# Patient Record
Sex: FEMALE | Race: BLACK OR AFRICAN AMERICAN | NOT HISPANIC OR LATINO | Employment: UNEMPLOYED | RURAL
[De-identification: names, ages, dates, MRNs, and addresses within clinical notes are randomized per-mention and may not be internally consistent; named-entity substitution may affect disease eponyms.]

---

## 2018-06-11 ENCOUNTER — HISTORICAL (OUTPATIENT)
Dept: ADMINISTRATIVE | Facility: HOSPITAL | Age: 53
End: 2018-06-11

## 2018-06-12 LAB
LAB AP CLINICAL INFORMATION: NORMAL
LAB AP DIAGNOSIS - HISTORICAL: NORMAL
LAB AP GROSS PATHOLOGY - HISTORICAL: NORMAL
LAB AP SPECIMEN SUBMITTED - HISTORICAL: NORMAL

## 2021-03-01 ENCOUNTER — HISTORICAL (OUTPATIENT)
Dept: ADMINISTRATIVE | Facility: HOSPITAL | Age: 56
End: 2021-03-01

## 2021-06-03 DIAGNOSIS — Z87.19 HISTORY OF GASTRIC POLYP: Primary | ICD-10-CM

## 2021-06-21 ENCOUNTER — OFFICE VISIT (OUTPATIENT)
Dept: GASTROENTEROLOGY | Facility: CLINIC | Age: 56
End: 2021-06-21
Payer: COMMERCIAL

## 2021-06-21 VITALS
DIASTOLIC BLOOD PRESSURE: 78 MMHG | SYSTOLIC BLOOD PRESSURE: 136 MMHG | WEIGHT: 192 LBS | OXYGEN SATURATION: 96 % | HEIGHT: 66 IN | BODY MASS INDEX: 30.86 KG/M2 | HEART RATE: 73 BPM

## 2021-06-21 DIAGNOSIS — R10.13 EPIGASTRIC PAIN: Primary | ICD-10-CM

## 2021-06-21 DIAGNOSIS — R10.11 RIGHT UPPER QUADRANT PAIN: ICD-10-CM

## 2021-06-21 DIAGNOSIS — Z86.010 PERSONAL HISTORY OF COLONIC POLYPS: ICD-10-CM

## 2021-06-21 DIAGNOSIS — R11.2 NAUSEA AND VOMITING, INTRACTABILITY OF VOMITING NOT SPECIFIED, UNSPECIFIED VOMITING TYPE: ICD-10-CM

## 2021-06-21 PROBLEM — Z86.0100 PERSONAL HISTORY OF COLONIC POLYPS: Status: ACTIVE | Noted: 2021-06-21

## 2021-06-21 PROCEDURE — 3008F PR BODY MASS INDEX (BMI) DOCUMENTED: ICD-10-PCS | Mod: CPTII,,, | Performed by: NURSE PRACTITIONER

## 2021-06-21 PROCEDURE — 3008F BODY MASS INDEX DOCD: CPT | Mod: CPTII,,, | Performed by: NURSE PRACTITIONER

## 2021-06-21 PROCEDURE — 99214 PR OFFICE/OUTPT VISIT, EST, LEVL IV, 30-39 MIN: ICD-10-PCS | Mod: ,,, | Performed by: NURSE PRACTITIONER

## 2021-06-21 PROCEDURE — 99214 OFFICE O/P EST MOD 30 MIN: CPT | Mod: ,,, | Performed by: NURSE PRACTITIONER

## 2021-06-21 RX ORDER — METOCLOPRAMIDE 10 MG/1
1 TABLET ORAL
COMMUNITY
Start: 2021-05-13

## 2021-06-21 RX ORDER — OMEPRAZOLE 40 MG/1
1 CAPSULE, DELAYED RELEASE ORAL 2 TIMES DAILY
COMMUNITY
Start: 2021-04-02

## 2021-06-21 RX ORDER — METFORMIN HYDROCHLORIDE 1000 MG/1
1 TABLET ORAL 2 TIMES DAILY
COMMUNITY
Start: 2021-04-02

## 2021-06-21 RX ORDER — ESTRADIOL 2 MG/1
1 TABLET ORAL DAILY
COMMUNITY
Start: 2021-06-18

## 2021-06-21 RX ORDER — QUETIAPINE FUMARATE 50 MG/1
1 TABLET, FILM COATED ORAL NIGHTLY
COMMUNITY
Start: 2021-04-10

## 2021-06-21 RX ORDER — TRAVOPROST OPHTHALMIC SOLUTION 0.04 MG/ML
1 SOLUTION OPHTHALMIC DAILY
COMMUNITY
Start: 2021-05-06

## 2021-06-21 RX ORDER — HYDRALAZINE HYDROCHLORIDE 100 MG/1
1 TABLET, FILM COATED ORAL 3 TIMES DAILY
COMMUNITY
Start: 2021-06-18

## 2021-06-21 RX ORDER — METRONIDAZOLE 500 MG/1
1 TABLET ORAL 2 TIMES DAILY
COMMUNITY
Start: 2021-05-13

## 2021-06-21 RX ORDER — BUSPIRONE HYDROCHLORIDE 15 MG/1
1 TABLET ORAL DAILY
COMMUNITY
Start: 2021-06-18

## 2021-06-21 RX ORDER — OXYCODONE AND ACETAMINOPHEN 10; 325 MG/1; MG/1
1 TABLET ORAL EVERY 12 HOURS PRN
COMMUNITY
Start: 2021-06-16 | End: 2022-01-05 | Stop reason: SDUPTHER

## 2021-06-21 RX ORDER — CARVEDILOL 12.5 MG/1
1 TABLET ORAL DAILY
COMMUNITY
Start: 2021-06-18

## 2021-06-21 RX ORDER — GABAPENTIN 400 MG/1
1 CAPSULE ORAL 2 TIMES DAILY
COMMUNITY
Start: 2021-01-11 | End: 2022-01-05 | Stop reason: SDUPTHER

## 2021-06-21 RX ORDER — NETARSUDIL 0.2 MG/ML
1 SOLUTION/ DROPS OPHTHALMIC; TOPICAL DAILY
COMMUNITY
Start: 2021-06-03

## 2021-06-21 RX ORDER — CITALOPRAM 20 MG/1
1 TABLET, FILM COATED ORAL DAILY
COMMUNITY
Start: 2021-06-18

## 2021-06-21 RX ORDER — LISINOPRIL AND HYDROCHLOROTHIAZIDE 20; 25 MG/1; MG/1
1 TABLET ORAL DAILY
COMMUNITY
Start: 2021-06-18

## 2021-06-21 RX ORDER — POTASSIUM CHLORIDE 20 MEQ/1
1 TABLET, EXTENDED RELEASE ORAL 2 TIMES DAILY
COMMUNITY
Start: 2021-06-18

## 2021-06-21 RX ORDER — DORZOLAMIDE HYDROCHLORIDE AND TIMOLOL MALEATE 20; 5 MG/ML; MG/ML
1 SOLUTION/ DROPS OPHTHALMIC 2 TIMES DAILY
COMMUNITY
Start: 2021-04-01

## 2021-06-30 ENCOUNTER — ANESTHESIA EVENT (OUTPATIENT)
Dept: GASTROENTEROLOGY | Facility: HOSPITAL | Age: 56
End: 2021-06-30
Payer: COMMERCIAL

## 2021-06-30 ENCOUNTER — ANESTHESIA (OUTPATIENT)
Dept: GASTROENTEROLOGY | Facility: HOSPITAL | Age: 56
End: 2021-06-30
Payer: COMMERCIAL

## 2021-06-30 ENCOUNTER — HOSPITAL ENCOUNTER (OUTPATIENT)
Dept: GASTROENTEROLOGY | Facility: HOSPITAL | Age: 56
Discharge: HOME | End: 2021-06-30
Attending: NURSE PRACTITIONER
Payer: COMMERCIAL

## 2021-06-30 VITALS
HEART RATE: 59 BPM | OXYGEN SATURATION: 100 % | DIASTOLIC BLOOD PRESSURE: 81 MMHG | SYSTOLIC BLOOD PRESSURE: 139 MMHG | TEMPERATURE: 98 F | RESPIRATION RATE: 14 BRPM

## 2021-06-30 DIAGNOSIS — K29.00 ACUTE SUPERFICIAL GASTRITIS WITHOUT HEMORRHAGE: ICD-10-CM

## 2021-06-30 DIAGNOSIS — R13.19 ESOPHAGEAL DYSPHAGIA: ICD-10-CM

## 2021-06-30 DIAGNOSIS — R10.13 EPIGASTRIC PAIN: ICD-10-CM

## 2021-06-30 LAB
GLUCOSE SERPL-MCNC: 106 MG/DL (ref 70–105)
GLUCOSE SERPL-MCNC: 106 MG/DL (ref 70–110)

## 2021-06-30 PROCEDURE — 27000284 HC CANNULA NASAL: Performed by: NURSE ANESTHETIST, CERTIFIED REGISTERED

## 2021-06-30 PROCEDURE — 88305 TISSUE EXAM BY PATHOLOGIST: CPT | Mod: SUR | Performed by: INTERNAL MEDICINE

## 2021-06-30 PROCEDURE — D9220A PRA ANESTHESIA: Mod: ,,, | Performed by: NURSE ANESTHETIST, CERTIFIED REGISTERED

## 2021-06-30 PROCEDURE — 27201423 OPTIME MED/SURG SUP & DEVICES STERILE SUPPLY

## 2021-06-30 PROCEDURE — C1889 IMPLANT/INSERT DEVICE, NOC: HCPCS

## 2021-06-30 PROCEDURE — 63600175 PHARM REV CODE 636 W HCPCS: Performed by: NURSE ANESTHETIST, CERTIFIED REGISTERED

## 2021-06-30 PROCEDURE — 88342 SURGICAL PATHOLOGY: ICD-10-PCS | Mod: 26,,, | Performed by: PATHOLOGY

## 2021-06-30 PROCEDURE — 88305 SURGICAL PATHOLOGY: ICD-10-PCS | Mod: 26,,, | Performed by: PATHOLOGY

## 2021-06-30 PROCEDURE — 37000009 HC ANESTHESIA EA ADD 15 MINS

## 2021-06-30 PROCEDURE — 88305 TISSUE EXAM BY PATHOLOGIST: CPT | Mod: 26,,, | Performed by: PATHOLOGY

## 2021-06-30 PROCEDURE — 25000003 PHARM REV CODE 250: Performed by: NURSE ANESTHETIST, CERTIFIED REGISTERED

## 2021-06-30 PROCEDURE — 43450 DILATE ESOPHAGUS 1/MULT PASS: CPT

## 2021-06-30 PROCEDURE — 82962 GLUCOSE BLOOD TEST: CPT

## 2021-06-30 PROCEDURE — D9220A PRA ANESTHESIA: ICD-10-PCS | Mod: ,,, | Performed by: NURSE ANESTHETIST, CERTIFIED REGISTERED

## 2021-06-30 PROCEDURE — 37000008 HC ANESTHESIA 1ST 15 MINUTES

## 2021-06-30 PROCEDURE — 88342 IMHCHEM/IMCYTCHM 1ST ANTB: CPT | Mod: 26,,, | Performed by: PATHOLOGY

## 2021-06-30 PROCEDURE — 43239 EGD BIOPSY SINGLE/MULTIPLE: CPT

## 2021-06-30 RX ORDER — PROPOFOL 10 MG/ML
VIAL (ML) INTRAVENOUS
Status: DISCONTINUED | OUTPATIENT
Start: 2021-06-30 | End: 2021-06-30

## 2021-06-30 RX ORDER — GLYCOPYRROLATE 0.2 MG/ML
INJECTION INTRAMUSCULAR; INTRAVENOUS
Status: DISCONTINUED | OUTPATIENT
Start: 2021-06-30 | End: 2021-06-30

## 2021-06-30 RX ORDER — SODIUM CHLORIDE 0.9 % (FLUSH) 0.9 %
10 SYRINGE (ML) INJECTION
Status: DISCONTINUED | OUTPATIENT
Start: 2021-06-30 | End: 2021-07-01 | Stop reason: HOSPADM

## 2021-06-30 RX ADMIN — PROPOFOL 50 MG: 10 INJECTION, EMULSION INTRAVENOUS at 11:06

## 2021-06-30 RX ADMIN — GLYCOPYRROLATE 0.2 MG: 0.2 INJECTION INTRAMUSCULAR; INTRAVENOUS at 11:06

## 2021-06-30 RX ADMIN — SODIUM CHLORIDE: 9 INJECTION, SOLUTION INTRAVENOUS at 11:06

## 2021-07-01 LAB
DHEA SERPL-MCNC: NORMAL
ESTROGEN SERPL-MCNC: NORMAL PG/ML
LAB AP GROSS DESCRIPTION: NORMAL
LAB AP LABORATORY NOTES: NORMAL
T3RU NFR SERPL: NORMAL %

## 2021-07-07 ENCOUNTER — HOSPITAL ENCOUNTER (OUTPATIENT)
Dept: RADIOLOGY | Facility: HOSPITAL | Age: 56
Discharge: HOME | End: 2021-07-07
Attending: NURSE PRACTITIONER
Payer: COMMERCIAL

## 2021-07-07 DIAGNOSIS — R10.11 RIGHT UPPER QUADRANT PAIN: ICD-10-CM

## 2021-07-07 DIAGNOSIS — R11.2 NAUSEA AND VOMITING, INTRACTABILITY OF VOMITING NOT SPECIFIED, UNSPECIFIED VOMITING TYPE: ICD-10-CM

## 2021-07-07 PROCEDURE — 78227 HEPATOBIL SYST IMAGE W/DRUG: CPT | Mod: TC

## 2021-07-07 PROCEDURE — 78227 HEPATOBIL SYST IMAGE W/DRUG: CPT | Mod: 26,,, | Performed by: RADIOLOGY

## 2021-07-07 PROCEDURE — 78227 NM HEPATOBILIARY(HIDA) WITH PHARM AND EF: ICD-10-PCS | Mod: 26,,, | Performed by: RADIOLOGY

## 2021-07-08 ENCOUNTER — TELEPHONE (OUTPATIENT)
Dept: GASTROENTEROLOGY | Facility: CLINIC | Age: 56
End: 2021-07-08

## 2021-07-08 DIAGNOSIS — K82.8 DYSFUNCTIONAL GALLBLADDER: Primary | ICD-10-CM

## 2021-07-22 ENCOUNTER — ANESTHESIA (OUTPATIENT)
Dept: SURGERY | Facility: HOSPITAL | Age: 56
End: 2021-07-22
Payer: COMMERCIAL

## 2021-07-22 ENCOUNTER — HOSPITAL ENCOUNTER (OUTPATIENT)
Facility: HOSPITAL | Age: 56
Discharge: HOME | End: 2021-07-23
Attending: FAMILY MEDICINE | Admitting: SURGERY
Payer: COMMERCIAL

## 2021-07-22 ENCOUNTER — ANESTHESIA EVENT (OUTPATIENT)
Dept: SURGERY | Facility: HOSPITAL | Age: 56
End: 2021-07-22
Payer: MEDICARE

## 2021-07-22 DIAGNOSIS — R10.11 RIGHT UPPER QUADRANT PAIN: ICD-10-CM

## 2021-07-22 DIAGNOSIS — R10.13 EPIGASTRIC PAIN: ICD-10-CM

## 2021-07-22 DIAGNOSIS — K29.00 ACUTE SUPERFICIAL GASTRITIS WITHOUT HEMORRHAGE: ICD-10-CM

## 2021-07-22 DIAGNOSIS — Z86.010 PERSONAL HISTORY OF COLONIC POLYPS: ICD-10-CM

## 2021-07-22 DIAGNOSIS — K80.00 CALCULUS OF GALLBLADDER WITH ACUTE CHOLECYSTITIS WITHOUT OBSTRUCTION: Primary | ICD-10-CM

## 2021-07-22 DIAGNOSIS — R13.19 ESOPHAGEAL DYSPHAGIA: ICD-10-CM

## 2021-07-22 DIAGNOSIS — R11.2 NAUSEA AND VOMITING: ICD-10-CM

## 2021-07-22 DIAGNOSIS — E87.6 HYPOKALEMIA: ICD-10-CM

## 2021-07-22 LAB
ALBUMIN SERPL BCP-MCNC: 3.2 G/DL (ref 3.5–5)
ALBUMIN/GLOB SERPL: 0.8 {RATIO}
ALP SERPL-CCNC: 80 U/L (ref 46–118)
ALT SERPL W P-5'-P-CCNC: 19 U/L (ref 13–56)
ANION GAP SERPL CALCULATED.3IONS-SCNC: 10 MMOL/L (ref 7–16)
AST SERPL W P-5'-P-CCNC: 12 U/L (ref 15–37)
BASOPHILS # BLD AUTO: 0.01 K/UL (ref 0–0.2)
BASOPHILS NFR BLD AUTO: 0.2 % (ref 0–1)
BILIRUB SERPL-MCNC: 0.2 MG/DL (ref 0–1.2)
BUN SERPL-MCNC: 6 MG/DL (ref 7–18)
BUN/CREAT SERPL: 8 (ref 6–20)
CALCIUM SERPL-MCNC: 9 MG/DL (ref 8.5–10.1)
CHLORIDE SERPL-SCNC: 103 MMOL/L (ref 98–107)
CO2 SERPL-SCNC: 34 MMOL/L (ref 21–32)
CREAT SERPL-MCNC: 0.71 MG/DL (ref 0.55–1.02)
DIFFERENTIAL METHOD BLD: ABNORMAL
EOSINOPHIL # BLD AUTO: 0.04 K/UL (ref 0–0.5)
EOSINOPHIL NFR BLD AUTO: 0.6 % (ref 1–4)
ERYTHROCYTE [DISTWIDTH] IN BLOOD BY AUTOMATED COUNT: 12.8 % (ref 11.5–14.5)
GLOBULIN SER-MCNC: 4.2 G/DL (ref 2–4)
GLUCOSE SERPL-MCNC: 101 MG/DL (ref 70–105)
GLUCOSE SERPL-MCNC: 107 MG/DL (ref 74–106)
GLUCOSE SERPL-MCNC: 108 MG/DL (ref 70–105)
HCT VFR BLD AUTO: 33.2 % (ref 38–47)
HGB BLD-MCNC: 11.4 G/DL (ref 12–16)
IMM GRANULOCYTES # BLD AUTO: 0.01 K/UL (ref 0–0.04)
IMM GRANULOCYTES NFR BLD: 0.2 % (ref 0–0.4)
LIPASE SERPL-CCNC: 72 U/L (ref 73–393)
LYMPHOCYTES # BLD AUTO: 2.62 K/UL (ref 1–4.8)
LYMPHOCYTES NFR BLD AUTO: 39.8 % (ref 27–41)
MCH RBC QN AUTO: 29.3 PG (ref 27–31)
MCHC RBC AUTO-ENTMCNC: 34.3 G/DL (ref 32–36)
MCV RBC AUTO: 85.3 FL (ref 80–96)
MONOCYTES # BLD AUTO: 0.39 K/UL (ref 0–0.8)
MONOCYTES NFR BLD AUTO: 5.9 % (ref 2–6)
MPC BLD CALC-MCNC: 9.1 FL (ref 9.4–12.4)
NEUTROPHILS # BLD AUTO: 3.52 K/UL (ref 1.8–7.7)
NEUTROPHILS NFR BLD AUTO: 53.3 % (ref 53–65)
NRBC # BLD AUTO: 0 X10E3/UL
NRBC, AUTO (.00): 0 %
PLATELET # BLD AUTO: 318 K/UL (ref 150–400)
POTASSIUM SERPL-SCNC: 2.8 MMOL/L (ref 3.5–5.1)
POTASSIUM SERPL-SCNC: 2.8 MMOL/L (ref 3.5–5.1)
PROT SERPL-MCNC: 7.4 G/DL (ref 6.4–8.2)
RBC # BLD AUTO: 3.89 M/UL (ref 4.2–5.4)
SODIUM SERPL-SCNC: 144 MMOL/L (ref 136–145)
WBC # BLD AUTO: 6.59 K/UL (ref 4.5–11)

## 2021-07-22 PROCEDURE — 27000689 HC BLADE LARYNGOSCOPE ANY SIZE: Performed by: ANESTHESIOLOGY

## 2021-07-22 PROCEDURE — 96375 TX/PRO/DX INJ NEW DRUG ADDON: CPT | Mod: 59

## 2021-07-22 PROCEDURE — 88304 SURGICAL PATHOLOGY: ICD-10-PCS | Mod: 26,,, | Performed by: PATHOLOGY

## 2021-07-22 PROCEDURE — D9220A PRA ANESTHESIA: Mod: CRNA,,, | Performed by: NURSE ANESTHETIST, CERTIFIED REGISTERED

## 2021-07-22 PROCEDURE — 85025 COMPLETE CBC W/AUTO DIFF WBC: CPT | Performed by: FAMILY MEDICINE

## 2021-07-22 PROCEDURE — 71000039 HC RECOVERY, EACH ADD'L HOUR: Performed by: SURGERY

## 2021-07-22 PROCEDURE — 27000260 *HC AIRWAY ORAL: Performed by: ANESTHESIOLOGY

## 2021-07-22 PROCEDURE — 27000165 HC TUBE, ETT CUFFED: Performed by: ANESTHESIOLOGY

## 2021-07-22 PROCEDURE — 99285 PR EMERGENCY DEPT VISIT,LEVEL V: ICD-10-PCS | Mod: ,,, | Performed by: FAMILY MEDICINE

## 2021-07-22 PROCEDURE — D9220A PRA ANESTHESIA: ICD-10-PCS | Mod: CRNA,,, | Performed by: NURSE ANESTHETIST, CERTIFIED REGISTERED

## 2021-07-22 PROCEDURE — G0378 HOSPITAL OBSERVATION PER HR: HCPCS

## 2021-07-22 PROCEDURE — 96374 THER/PROPH/DIAG INJ IV PUSH: CPT | Mod: 59 | Performed by: FAMILY MEDICINE

## 2021-07-22 PROCEDURE — D9220A PRA ANESTHESIA: Mod: ANES,,, | Performed by: ANESTHESIOLOGY

## 2021-07-22 PROCEDURE — 83690 ASSAY OF LIPASE: CPT | Performed by: FAMILY MEDICINE

## 2021-07-22 PROCEDURE — 36415 COLL VENOUS BLD VENIPUNCTURE: CPT | Performed by: FAMILY MEDICINE

## 2021-07-22 PROCEDURE — 63600175 PHARM REV CODE 636 W HCPCS: Performed by: NURSE PRACTITIONER

## 2021-07-22 PROCEDURE — 63600175 PHARM REV CODE 636 W HCPCS: Performed by: ANESTHESIOLOGY

## 2021-07-22 PROCEDURE — 99285 EMERGENCY DEPT VISIT HI MDM: CPT | Performed by: FAMILY MEDICINE

## 2021-07-22 PROCEDURE — 80053 COMPREHEN METABOLIC PANEL: CPT | Performed by: FAMILY MEDICINE

## 2021-07-22 PROCEDURE — 36415 COLL VENOUS BLD VENIPUNCTURE: CPT | Performed by: NURSE PRACTITIONER

## 2021-07-22 PROCEDURE — 47562 LAPAROSCOPIC CHOLECYSTECTOMY: CPT | Mod: ,,, | Performed by: SURGERY

## 2021-07-22 PROCEDURE — 82962 GLUCOSE BLOOD TEST: CPT | Mod: 91

## 2021-07-22 PROCEDURE — 94761 N-INVAS EAR/PLS OXIMETRY MLT: CPT

## 2021-07-22 PROCEDURE — 88304 TISSUE EXAM BY PATHOLOGIST: CPT | Mod: SUR | Performed by: SURGERY

## 2021-07-22 PROCEDURE — 27000221 HC OXYGEN, UP TO 24 HOURS

## 2021-07-22 PROCEDURE — 27000510 HC BLANKET BAIR HUGGER ANY SIZE: Performed by: ANESTHESIOLOGY

## 2021-07-22 PROCEDURE — 96375 TX/PRO/DX INJ NEW DRUG ADDON: CPT | Mod: 59 | Performed by: FAMILY MEDICINE

## 2021-07-22 PROCEDURE — 63600175 PHARM REV CODE 636 W HCPCS: Performed by: FAMILY MEDICINE

## 2021-07-22 PROCEDURE — 25000003 PHARM REV CODE 250: Performed by: SURGERY

## 2021-07-22 PROCEDURE — 88304 TISSUE EXAM BY PATHOLOGIST: CPT | Mod: 26,,, | Performed by: PATHOLOGY

## 2021-07-22 PROCEDURE — 63600175 PHARM REV CODE 636 W HCPCS: Performed by: NURSE ANESTHETIST, CERTIFIED REGISTERED

## 2021-07-22 PROCEDURE — 36000709 HC OR TIME LEV III EA ADD 15 MIN: Performed by: SURGERY

## 2021-07-22 PROCEDURE — 84132 ASSAY OF SERUM POTASSIUM: CPT | Performed by: NURSE PRACTITIONER

## 2021-07-22 PROCEDURE — 36000708 HC OR TIME LEV III 1ST 15 MIN: Performed by: SURGERY

## 2021-07-22 PROCEDURE — 27000716 HC OXISENSOR PROBE, ANY SIZE: Performed by: ANESTHESIOLOGY

## 2021-07-22 PROCEDURE — 47562 PR LAP,CHOLECYSTECTOMY: ICD-10-PCS | Mod: ,,, | Performed by: SURGERY

## 2021-07-22 PROCEDURE — 25000003 PHARM REV CODE 250: Performed by: FAMILY MEDICINE

## 2021-07-22 PROCEDURE — 99285 EMERGENCY DEPT VISIT HI MDM: CPT | Mod: ,,, | Performed by: FAMILY MEDICINE

## 2021-07-22 PROCEDURE — 37000008 HC ANESTHESIA 1ST 15 MINUTES: Performed by: SURGERY

## 2021-07-22 PROCEDURE — 71000033 HC RECOVERY, INTIAL HOUR: Performed by: SURGERY

## 2021-07-22 PROCEDURE — D9220A PRA ANESTHESIA: ICD-10-PCS | Mod: ANES,,, | Performed by: ANESTHESIOLOGY

## 2021-07-22 PROCEDURE — 99219 PR INITIAL OBSERVATION CARE,LEVL II: CPT | Mod: 57,,, | Performed by: SURGERY

## 2021-07-22 PROCEDURE — 99219 PR INITIAL OBSERVATION CARE,LEVL II: ICD-10-PCS | Mod: 57,,, | Performed by: SURGERY

## 2021-07-22 PROCEDURE — 37000009 HC ANESTHESIA EA ADD 15 MINS: Performed by: SURGERY

## 2021-07-22 PROCEDURE — 27201423 OPTIME MED/SURG SUP & DEVICES STERILE SUPPLY: Performed by: SURGERY

## 2021-07-22 PROCEDURE — 25000003 PHARM REV CODE 250: Performed by: NURSE ANESTHETIST, CERTIFIED REGISTERED

## 2021-07-22 PROCEDURE — 25000003 PHARM REV CODE 250: Performed by: NURSE PRACTITIONER

## 2021-07-22 RX ORDER — ONDANSETRON 2 MG/ML
4 INJECTION INTRAMUSCULAR; INTRAVENOUS ONCE AS NEEDED
Status: DISCONTINUED | OUTPATIENT
Start: 2021-07-22 | End: 2021-07-23 | Stop reason: HOSPADM

## 2021-07-22 RX ORDER — OXYCODONE HYDROCHLORIDE 5 MG/1
5 TABLET ORAL
Status: DISCONTINUED | OUTPATIENT
Start: 2021-07-22 | End: 2021-07-22 | Stop reason: HOSPADM

## 2021-07-22 RX ORDER — MIDAZOLAM HYDROCHLORIDE 1 MG/ML
INJECTION INTRAMUSCULAR; INTRAVENOUS
Status: DISCONTINUED | OUTPATIENT
Start: 2021-07-22 | End: 2021-07-22

## 2021-07-22 RX ORDER — ROCURONIUM BROMIDE 10 MG/ML
INJECTION, SOLUTION INTRAVENOUS
Status: DISCONTINUED | OUTPATIENT
Start: 2021-07-22 | End: 2021-07-22

## 2021-07-22 RX ORDER — LIDOCAINE HYDROCHLORIDE 20 MG/ML
INJECTION, SOLUTION EPIDURAL; INFILTRATION; INTRACAUDAL; PERINEURAL
Status: DISCONTINUED | OUTPATIENT
Start: 2021-07-22 | End: 2021-07-22

## 2021-07-22 RX ORDER — MORPHINE SULFATE 10 MG/ML
4 INJECTION INTRAMUSCULAR; INTRAVENOUS; SUBCUTANEOUS EVERY 5 MIN PRN
Status: DISCONTINUED | OUTPATIENT
Start: 2021-07-22 | End: 2021-07-22 | Stop reason: HOSPADM

## 2021-07-22 RX ORDER — POTASSIUM CHLORIDE 20 MEQ/1
40 TABLET, EXTENDED RELEASE ORAL ONCE
Status: DISCONTINUED | OUTPATIENT
Start: 2021-07-22 | End: 2021-07-22

## 2021-07-22 RX ORDER — ACETAMINOPHEN 325 MG/1
650 TABLET ORAL EVERY 8 HOURS PRN
Status: DISCONTINUED | OUTPATIENT
Start: 2021-07-22 | End: 2021-07-23 | Stop reason: HOSPADM

## 2021-07-22 RX ORDER — ENOXAPARIN SODIUM 100 MG/ML
40 INJECTION SUBCUTANEOUS EVERY 24 HOURS
Status: DISCONTINUED | OUTPATIENT
Start: 2021-07-23 | End: 2021-07-23 | Stop reason: HOSPADM

## 2021-07-22 RX ORDER — POTASSIUM CHLORIDE 20 MEQ/1
40 TABLET, EXTENDED RELEASE ORAL ONCE
Status: COMPLETED | OUTPATIENT
Start: 2021-07-22 | End: 2021-07-22

## 2021-07-22 RX ORDER — MEPERIDINE HYDROCHLORIDE 25 MG/ML
25 INJECTION INTRAMUSCULAR; INTRAVENOUS; SUBCUTANEOUS EVERY 10 MIN PRN
Status: DISCONTINUED | OUTPATIENT
Start: 2021-07-22 | End: 2021-07-22 | Stop reason: HOSPADM

## 2021-07-22 RX ORDER — HYDROMORPHONE HYDROCHLORIDE 2 MG/ML
0.5 INJECTION, SOLUTION INTRAMUSCULAR; INTRAVENOUS; SUBCUTANEOUS EVERY 5 MIN PRN
Status: DISCONTINUED | OUTPATIENT
Start: 2021-07-22 | End: 2021-07-22 | Stop reason: HOSPADM

## 2021-07-22 RX ORDER — ONDANSETRON 2 MG/ML
4 INJECTION INTRAMUSCULAR; INTRAVENOUS DAILY PRN
Status: DISCONTINUED | OUTPATIENT
Start: 2021-07-22 | End: 2021-07-22 | Stop reason: HOSPADM

## 2021-07-22 RX ORDER — KETOROLAC TROMETHAMINE 30 MG/ML
15 INJECTION, SOLUTION INTRAMUSCULAR; INTRAVENOUS EVERY 6 HOURS PRN
Status: DISCONTINUED | OUTPATIENT
Start: 2021-07-22 | End: 2021-07-23 | Stop reason: HOSPADM

## 2021-07-22 RX ORDER — DIPHENHYDRAMINE HYDROCHLORIDE 50 MG/ML
25 INJECTION INTRAMUSCULAR; INTRAVENOUS EVERY 6 HOURS PRN
Status: DISCONTINUED | OUTPATIENT
Start: 2021-07-22 | End: 2021-07-22 | Stop reason: HOSPADM

## 2021-07-22 RX ORDER — BUPIVACAINE HYDROCHLORIDE 2.5 MG/ML
INJECTION, SOLUTION EPIDURAL; INFILTRATION; INTRACAUDAL
Status: DISCONTINUED | OUTPATIENT
Start: 2021-07-22 | End: 2021-07-22 | Stop reason: HOSPADM

## 2021-07-22 RX ORDER — PROPOFOL 10 MG/ML
VIAL (ML) INTRAVENOUS
Status: DISCONTINUED | OUTPATIENT
Start: 2021-07-22 | End: 2021-07-22

## 2021-07-22 RX ORDER — HYDROMORPHONE HYDROCHLORIDE 2 MG/ML
1 INJECTION, SOLUTION INTRAMUSCULAR; INTRAVENOUS; SUBCUTANEOUS
Status: COMPLETED | OUTPATIENT
Start: 2021-07-22 | End: 2021-07-22

## 2021-07-22 RX ORDER — CEFAZOLIN SODIUM 1 G/3ML
INJECTION, POWDER, FOR SOLUTION INTRAMUSCULAR; INTRAVENOUS
Status: DISCONTINUED | OUTPATIENT
Start: 2021-07-22 | End: 2021-07-22

## 2021-07-22 RX ORDER — FENTANYL CITRATE 50 UG/ML
INJECTION, SOLUTION INTRAMUSCULAR; INTRAVENOUS
Status: DISCONTINUED | OUTPATIENT
Start: 2021-07-22 | End: 2021-07-22

## 2021-07-22 RX ORDER — POTASSIUM CHLORIDE 14.9 MG/ML
INJECTION INTRAVENOUS CONTINUOUS PRN
Status: DISCONTINUED | OUTPATIENT
Start: 2021-07-22 | End: 2021-07-22

## 2021-07-22 RX ORDER — ONDANSETRON 2 MG/ML
4 INJECTION INTRAMUSCULAR; INTRAVENOUS
Status: COMPLETED | OUTPATIENT
Start: 2021-07-22 | End: 2021-07-22

## 2021-07-22 RX ORDER — HYDROCODONE BITARTRATE AND ACETAMINOPHEN 7.5; 325 MG/1; MG/1
1 TABLET ORAL EVERY 6 HOURS PRN
Status: DISCONTINUED | OUTPATIENT
Start: 2021-07-22 | End: 2021-07-23 | Stop reason: HOSPADM

## 2021-07-22 RX ORDER — DIPHENHYDRAMINE HCL 25 MG
25 CAPSULE ORAL EVERY 6 HOURS PRN
Status: DISCONTINUED | OUTPATIENT
Start: 2021-07-22 | End: 2021-07-23 | Stop reason: HOSPADM

## 2021-07-22 RX ORDER — CARVEDILOL 12.5 MG/1
12.5 TABLET ORAL DAILY
Status: DISCONTINUED | OUTPATIENT
Start: 2021-07-22 | End: 2021-07-23 | Stop reason: HOSPADM

## 2021-07-22 RX ORDER — TRAMADOL HYDROCHLORIDE 50 MG/1
50 TABLET ORAL EVERY 6 HOURS PRN
Status: DISCONTINUED | OUTPATIENT
Start: 2021-07-22 | End: 2021-07-23 | Stop reason: HOSPADM

## 2021-07-22 RX ORDER — SODIUM CHLORIDE, SODIUM LACTATE, POTASSIUM CHLORIDE, CALCIUM CHLORIDE 600; 310; 30; 20 MG/100ML; MG/100ML; MG/100ML; MG/100ML
INJECTION, SOLUTION INTRAVENOUS CONTINUOUS
Status: DISCONTINUED | OUTPATIENT
Start: 2021-07-22 | End: 2021-07-23 | Stop reason: HOSPADM

## 2021-07-22 RX ADMIN — POTASSIUM CHLORIDE 40 MEQ: 1500 TABLET, EXTENDED RELEASE ORAL at 11:07

## 2021-07-22 RX ADMIN — ONDANSETRON 4 MG: 2 INJECTION INTRAMUSCULAR; INTRAVENOUS at 10:07

## 2021-07-22 RX ADMIN — HYDROMORPHONE HYDROCHLORIDE 0.5 MG: 2 INJECTION, SOLUTION INTRAMUSCULAR; INTRAVENOUS; SUBCUTANEOUS at 04:07

## 2021-07-22 RX ADMIN — CEFAZOLIN 2 G: 1 INJECTION, POWDER, FOR SOLUTION INTRAMUSCULAR; INTRAVENOUS; PARENTERAL at 03:07

## 2021-07-22 RX ADMIN — SODIUM CHLORIDE, POTASSIUM CHLORIDE, SODIUM LACTATE AND CALCIUM CHLORIDE: 600; 310; 30; 20 INJECTION, SOLUTION INTRAVENOUS at 02:07

## 2021-07-22 RX ADMIN — POTASSIUM CHLORIDE: 14.9 INJECTION, SOLUTION INTRAVENOUS at 02:07

## 2021-07-22 RX ADMIN — ROCURONIUM BROMIDE 50 MG: 10 INJECTION INTRAVENOUS at 02:07

## 2021-07-22 RX ADMIN — LIDOCAINE HYDROCHLORIDE 80 MG: 20 INJECTION, SOLUTION INTRAVENOUS at 02:07

## 2021-07-22 RX ADMIN — FENTANYL CITRATE 100 MCG: 50 INJECTION INTRAMUSCULAR; INTRAVENOUS at 03:07

## 2021-07-22 RX ADMIN — MIDAZOLAM HYDROCHLORIDE 2 MG: 1 INJECTION, SOLUTION INTRAMUSCULAR; INTRAVENOUS at 02:07

## 2021-07-22 RX ADMIN — HYDROMORPHONE HYDROCHLORIDE 1 MG: 2 INJECTION, SOLUTION INTRAMUSCULAR; INTRAVENOUS; SUBCUTANEOUS at 10:07

## 2021-07-22 RX ADMIN — SUGAMMADEX 200 MG: 100 INJECTION, SOLUTION INTRAVENOUS at 03:07

## 2021-07-22 RX ADMIN — KETOROLAC TROMETHAMINE 15 MG: 30 INJECTION, SOLUTION INTRAMUSCULAR at 08:07

## 2021-07-22 RX ADMIN — FENTANYL CITRATE 100 MCG: 50 INJECTION INTRAMUSCULAR; INTRAVENOUS at 02:07

## 2021-07-22 RX ADMIN — PROPOFOL 200 MG: 10 INJECTION, EMULSION INTRAVENOUS at 02:07

## 2021-07-22 RX ADMIN — CEFTRIAXONE SODIUM 2 G: 2 INJECTION, POWDER, FOR SOLUTION INTRAMUSCULAR; INTRAVENOUS at 12:07

## 2021-07-22 RX ADMIN — CARVEDILOL 12.5 MG: 12.5 TABLET, FILM COATED ORAL at 12:07

## 2021-07-22 NOTE — ED PROVIDER NOTES
Encounter Date: 2021    SCRIBE #1 NOTE: I, Anna Cole, am scribing for, and in the presence of,  Regina Stover. I have scribed the entire note.       History     Chief Complaint   Patient presents with    Abdominal Pain     Patient is a 56 year old female complaining of right upper quadrant abdominal pain starting one month ago. Patient states having an Esophagogastroduodenoscopy (EGD) and HIDA scan performed after being evaluated by a gastroenterologist but states her pain has continued. She reports nausea and vomiting that has continued at home. Symptoms are moderate.     The history is provided by the patient. No  was used.     Review of patient's allergies indicates:  No Known Allergies  Past Medical History:   Diagnosis Date    Acute superficial gastritis without hemorrhage 2021    Diabetes mellitus     Esophageal dysphagia 2021    GERD (gastroesophageal reflux disease)     Hypertension     Low back pain     Lumbar radiculopathy     Seizures     Sleep apnea      Past Surgical History:   Procedure Laterality Date     SECTION      COLONOSCOPY  2018    Dr. Naranjo    EPIDURAL STEROID INJECTION  2018    L4-5 MARCOS X 5 - Cook    EPIDURAL STEROID INJECTION  2017    L5-S1 MARCOS - Cook    ESOPHAGOGASTRODUODENOSCOPY  2018    EYE SURGERY      HYSTERECTOMY      INJECTION OF FACET JOINT Bilateral 2017    Bilateral L3-S1 Facet Injection - Cook     History reviewed. No pertinent family history.  Social History     Tobacco Use    Smoking status: Never Smoker   Substance Use Topics    Alcohol use: Never    Drug use: Never     Review of Systems   Constitutional: Negative.    HENT: Negative.    Eyes: Negative.    Respiratory: Negative.    Cardiovascular: Negative.    Gastrointestinal: Positive for abdominal pain (RUQ), nausea and vomiting.   Endocrine: Negative.    Genitourinary: Negative.    Musculoskeletal: Negative.    Skin: Negative.     Allergic/Immunologic: Negative.    Neurological: Negative.    Hematological: Negative.    Psychiatric/Behavioral: Negative.    All other systems reviewed and are negative.      Physical Exam     Initial Vitals [07/22/21 0934]   BP Pulse Resp Temp SpO2   139/85 71 18 98.6 °F (37 °C) 95 %      MAP       --         Physical Exam    Vitals reviewed.  Constitutional: She appears well-developed and well-nourished. No distress.   HENT:   Head: Normocephalic.   Eyes: Conjunctivae are normal. Pupils are equal, round, and reactive to light.   Cardiovascular: Normal rate, regular rhythm, normal heart sounds and intact distal pulses.   Pulmonary/Chest: Breath sounds normal.   Abdominal: Abdomen is soft. Bowel sounds are normal. There is abdominal tenderness (moderate) in the right upper quadrant. There is positive Faulkner's sign.     Neurological: She is alert and oriented to person, place, and time.   Skin: Skin is warm and dry.   Psychiatric: She has a normal mood and affect.         ED Course   Procedures  Labs Reviewed   COMPREHENSIVE METABOLIC PANEL - Abnormal; Notable for the following components:       Result Value    Potassium 2.8 (*)     CO2 34 (*)     Glucose 107 (*)     BUN 6 (*)     Albumin 3.2 (*)     Globulin 4.2 (*)     AST 12 (*)     All other components within normal limits   LIPASE - Abnormal; Notable for the following components:    Lipase 72 (*)     All other components within normal limits   CBC WITH DIFFERENTIAL - Abnormal; Notable for the following components:    RBC 3.89 (*)     Hemoglobin 11.4 (*)     Hematocrit 33.2 (*)     MPV 9.1 (*)     Eosinophils % 0.6 (*)     All other components within normal limits   POTASSIUM - Abnormal; Notable for the following components:    Potassium 2.8 (*)     All other components within normal limits   POCT GLUCOSE MONITORING CONTINUOUS - Abnormal; Notable for the following components:    POC Glucose 108 (*)     All other components within normal limits   CBC W/ AUTO  DIFFERENTIAL    Narrative:     The following orders were created for panel order CBC auto differential.  Procedure                               Abnormality         Status                     ---------                               -----------         ------                     CBC with Differential[[id number]]        Abnormal            Final result                 Please view results for these tests on the individual orders.   EXTRA TUBES    Narrative:     The following orders were created for panel order EXTRA TUBES.  Procedure                               Abnormality         Status                     ---------                               -----------         ------                     Gold Top Hold[[id number]]                                    In process                   Please view results for these tests on the individual orders.   GOLD TOP HOLD          Imaging Results          US Abdomen Limited (Final result)  Result time 07/22/21 11:14:31    Final result by Russell Young MD (07/22/21 11:14:31)                 Impression:      Cholelithiasis.  No gallbladder wall thickening.  No abnormal pericholecystic fluid.    Benign cyst right kidney      Electronically signed by: Russell Young  Date:    07/22/2021  Time:    11:14             Narrative:    EXAMINATION:  US ABDOMEN LIMITED.  Abdominal ultrasound limited to the right upper quadrant    CLINICAL HISTORY:  RUQ pain;.    TECHNIQUE:  Real-time ultrasound images are captured and archived.    COMPARISON:  [facility], 2018    FINDINGS:  There is a small mobile highly echogenic focus with posterior acoustic shadowing compatible with gallstone in the lumen of the gallbladder.  There are some smaller hyperechoic floating areas of debris otherwise.  There is no gallbladder wall thickening.  There is no sonographic Faulkner sign.    There is no abnormal biliary dilatation.  Common bile duct measures 4 mm.    Liver is normal in size at 15.6 cm and is without  focal mass lesion.  There is hepatopedal flow in the portal vein.    Pancreas is obscured by bowel gas.  Abdominal aorta and IVC are normal where seen.    Right kidney measures 11.9 cm length and contains a rounded 18 mm simple cyst at its mid aspect.                                 Medications   cefTRIAXone (ROCEPHIN) 2 g in dextrose 5 % in water (D5W) 5 % 50 mL IVPB (MB+) (2 g Intravenous New Bag 7/22/21 1247)   carvediloL tablet 12.5 mg (12.5 mg Oral Given 7/22/21 1248)   ondansetron injection 4 mg (4 mg Intravenous Given 7/22/21 1029)   HYDROmorphone (PF) injection 1 mg (1 mg Intravenous Given 7/22/21 1029)   potassium chloride SA CR tablet 40 mEq (40 mEq Oral Given 7/22/21 1116)     Medical Decision Making:   History:   Old Records Summarized: other records.       <> Summary of Records: Dr. Stover reviews EGD and HIDA scan reports that were ordered on 6/21 after patient saw GI. EGD report shows errythemas mucosa in the fundus, biopsies were done, and esophagus was dilated. HIDA scan performed on 7/7 shows questionable cholecystitis.  Other:   I have discussed this case with another health care provider.       <> Summary of the Discussion: 1130 Dr. Stover consults Dr. Wagner, on call general surgeon.     1133 Dr. Wagner evaluates patient with his NP Shell Villareal.    1144 Dr. Wagner will admit patient to surgery for cholecystectomy.             Attending Attestation:           Physician Attestation for Scribe:  Physician Attestation Statement for Scribe #1: I, Regina Medrano M.D., reviewed documentation, as scribed by Anna De in my presence, and it is both accurate and complete.                   Clinical Impression:   Final diagnoses:  [K80.00] Calculus of gallbladder with acute cholecystitis without obstruction (Primary)          ED Disposition Condition    Admit               Regina Stover MD  07/22/21 4768

## 2021-07-22 NOTE — SUBJECTIVE & OBJECTIVE
No current facility-administered medications on file prior to encounter.     Current Outpatient Medications on File Prior to Encounter   Medication Sig    busPIRone (BUSPAR) 15 MG tablet Take 1 tablet by mouth once daily.    carvediloL (COREG) 12.5 MG tablet Take 1 tablet by mouth once daily.    citalopram (CELEXA) 20 MG tablet Take 1 tablet by mouth once daily.    dorzolamide-timolol 2-0.5% (COSOPT) 22.3-6.8 mg/mL ophthalmic solution Place 1 drop into both eyes 2 (two) times a day.    estradioL (ESTRACE) 2 MG tablet Take 1 tablet by mouth once daily.    gabapentin (NEURONTIN) 400 MG capsule Take 1 capsule by mouth 2 (two) times a day.    hydrALAZINE (APRESOLINE) 100 MG tablet Take 1 tablet by mouth 3 (three) times daily.    lisinopriL-hydrochlorothiazide (PRINZIDE,ZESTORETIC) 20-25 mg Tab Take 1 tablet by mouth once daily.    metFORMIN (GLUCOPHAGE) 1000 MG tablet Take 1 tablet by mouth 2 (two) times a day.    metoclopramide HCl (REGLAN) 10 MG tablet Take 1 tablet by mouth 4 (four) times daily before meals and nightly.    metroNIDAZOLE (FLAGYL) 500 MG tablet Take 1 tablet by mouth 2 (two) times a day.    omeprazole (PRILOSEC) 40 MG capsule Take 1 capsule by mouth 2 (two) times a day.    oxyCODONE-acetaminophen (PERCOCET)  mg per tablet Take 1 tablet by mouth every 12 (twelve) hours as needed.    potassium chloride SA (K-DUR,KLOR-CON) 20 MEQ tablet Take 1 tablet by mouth 2 (two) times a day.    QUEtiapine (SEROQUEL) 50 MG tablet Take 1 tablet by mouth every evening.    RHOPRESSA 0.02 % ophthalmic solution Place 1 drop into both eyes once daily.    travoprost (TRAVATAN Z) 0.004 % ophthalmic solution Place 1 drop into both eyes once daily.       Review of patient's allergies indicates:  No Known Allergies    Past Medical History:   Diagnosis Date    Acute superficial gastritis without hemorrhage 6/30/2021    Diabetes mellitus     Esophageal dysphagia 6/30/2021    GERD (gastroesophageal reflux  disease)     Hypertension     Low back pain     Lumbar radiculopathy     Seizures     Sleep apnea      Past Surgical History:   Procedure Laterality Date     SECTION      COLONOSCOPY  2018    Dr. Naranjo    EPIDURAL STEROID INJECTION  2018    L4-5 MARCOS X 5 - Cook    EPIDURAL STEROID INJECTION  2017    L5-S1 MARCOS - Cook    ESOPHAGOGASTRODUODENOSCOPY  2018    EYE SURGERY      HYSTERECTOMY      INJECTION OF FACET JOINT Bilateral 2017    Bilateral L3-S1 Facet Injection - Cook     Family History    None       Tobacco Use    Smoking status: Never Smoker   Substance and Sexual Activity    Alcohol use: Never    Drug use: Never    Sexual activity: Not on file     Review of Systems   Gastrointestinal: Positive for abdominal pain, nausea and vomiting.   All other systems reviewed and are negative.    Objective:     Vital Signs (Most Recent):  Temp: 98.6 °F (37 °C) (21 0934)  Pulse: 71 (21 0934)  Resp: 18 (21 1029)  BP: 139/85 (21 0934)  SpO2: 95 % (21 0934) Vital Signs (24h Range):  Temp:  [98.6 °F (37 °C)] 98.6 °F (37 °C)  Pulse:  [71] 71  Resp:  [18] 18  SpO2:  [95 %] 95 %  BP: (139)/(85) 139/85     Weight: 83.5 kg (184 lb)  Body mass index is 29.7 kg/m².    Physical Exam  Constitutional:       Appearance: Normal appearance.   HENT:      Head: Normocephalic.      Mouth/Throat:      Mouth: Mucous membranes are moist.   Eyes:      Conjunctiva/sclera: Conjunctivae normal.   Cardiovascular:      Rate and Rhythm: Normal rate and regular rhythm.   Pulmonary:      Effort: Pulmonary effort is normal.   Abdominal:      Palpations: Abdomen is soft.      Tenderness: There is abdominal tenderness. There is guarding.      Comments: RUQ   Musculoskeletal:         General: Normal range of motion.   Skin:     General: Skin is warm and dry.      Capillary Refill: Capillary refill takes less than 2 seconds.   Neurological:      Mental Status: She is alert.    Psychiatric:         Mood and Affect: Mood normal.         Significant Labs:  I have reviewed all pertinent lab results within the past 24 hours.  CBC:   Recent Labs   Lab 07/22/21  1015   WBC 6.59   RBC 3.89*   HGB 11.4*   HCT 33.2*      MCV 85.3   MCH 29.3   MCHC 34.3     BMP:   Recent Labs   Lab 07/22/21  1016   *      K 2.8*      CO2 34*   BUN 6*   CREATININE 0.71   CALCIUM 9.0     CMP:   Recent Labs   Lab 07/22/21  1016   *   CALCIUM 9.0   ALBUMIN 3.2*   PROT 7.4      K 2.8*   CO2 34*      BUN 6*   CREATININE 0.71   ALKPHOS 80   ALT 19   AST 12*   BILITOT 0.2     LFTs:   Recent Labs   Lab 07/22/21  1016   ALT 19   AST 12*   ALKPHOS 80   BILITOT 0.2   PROT 7.4   ALBUMIN 3.2*       Significant Diagnostics:  I have reviewed all pertinent imaging results/findings within the past 24 hours.

## 2021-07-22 NOTE — BRIEF OP NOTE
[facility] - Periop Services  Brief Operative Note    SUMMARY     Surgery Date: 7/22/2021     Surgeon(s) and Role:     * Amy Wagner MD - Primary    Assisting Surgeon: None    Pre-op Diagnosis:  Calculus of gallbladder with acute cholecystitis without obstruction [K80.00]    Post-op Diagnosis:  Post-Op Diagnosis Codes:     * Calculus of gallbladder with acute cholecystitis without obstruction [K80.00]    Procedure(s) (LRB):  CHOLECYSTECTOMY, LAPAROSCOPIC (N/A)    Anesthesia: General    Operative Findings:  Cholelithiasis    Estimated Blood Loss: * No values recorded between 7/22/2021  3:10 PM and 7/22/2021  3:48 PM *    Estimated Blood Loss has been documented.         Specimens:   Specimen (24h ago, onward)             Start     Ordered    07/22/21 1529  Surgical Pathology  RELEASE UPON ORDERING         07/22/21 1529                [id number]

## 2021-07-22 NOTE — OR NURSING
1745 received care of pt. Pt resting quietly. Denies needs. Denies pain or nausea. accucheck 101.     1750 room number obtained from nursing supervisor.    1755 6 [place] staff called and notified of room.     1800 pt to room via stretcher    1805 pt released to TREASURE Aguilar. RN & TREASURE Castellano LPN  Family notified of room number  Pt transfers self to floor bed.   183/83, 61, 12, 99%(2l nc), 97.9

## 2021-07-22 NOTE — HPI
55 yo female with HTN,  abdominal pain HIDA 2 weeks ordered by GI with EF 0%, Gallbladder not clearly visualized, presented to ED today with RUQ pain, n/v, no leukocytosis, gallstones on gallbladder us, hypokalemia 2.8 received k supplement, patient reports fully vaccinated  Dr. Wagner consulted for surgical management

## 2021-07-22 NOTE — H&P
[facility] - Emergency Department  General Surgery  History & Physical    Patient Name: Audrey Causey  MRN: [id number]  Admission Date: 7/22/2021  Attending Physician: Amy Wagner MD   Primary Care Provider: Carissa Barker MD    Patient information was obtained from patient and ER records.     Subjective:     Chief Complaint/Reason for Admission: RUQ pain, n/v    History of Present Illness: 55 yo female with HTN,  abdominal pain HIDA 2 weeks ordered by GI with EF 0%, Gallbladder not clearly visualized, presented to ED today with RUQ pain, n/v, no leukocytosis, gallstones on gallbladder us, hypokalemia 2.8 received k supplement, patient reports fully vaccinated  Dr. Wagner consulted for surgical management      No current facility-administered medications on file prior to encounter.     Current Outpatient Medications on File Prior to Encounter   Medication Sig    busPIRone (BUSPAR) 15 MG tablet Take 1 tablet by mouth once daily.    carvediloL (COREG) 12.5 MG tablet Take 1 tablet by mouth once daily.    citalopram (CELEXA) 20 MG tablet Take 1 tablet by mouth once daily.    dorzolamide-timolol 2-0.5% (COSOPT) 22.3-6.8 mg/mL ophthalmic solution Place 1 drop into both eyes 2 (two) times a day.    estradioL (ESTRACE) 2 MG tablet Take 1 tablet by mouth once daily.    gabapentin (NEURONTIN) 400 MG capsule Take 1 capsule by mouth 2 (two) times a day.    hydrALAZINE (APRESOLINE) 100 MG tablet Take 1 tablet by mouth 3 (three) times daily.    lisinopriL-hydrochlorothiazide (PRINZIDE,ZESTORETIC) 20-25 mg Tab Take 1 tablet by mouth once daily.    metFORMIN (GLUCOPHAGE) 1000 MG tablet Take 1 tablet by mouth 2 (two) times a day.    metoclopramide HCl (REGLAN) 10 MG tablet Take 1 tablet by mouth 4 (four) times daily before meals and nightly.    metroNIDAZOLE (FLAGYL) 500 MG tablet Take 1 tablet by mouth 2 (two) times a day.    omeprazole (PRILOSEC) 40 MG capsule Take 1 capsule by mouth 2 (two) times a day.     oxyCODONE-acetaminophen (PERCOCET)  mg per tablet Take 1 tablet by mouth every 12 (twelve) hours as needed.    potassium chloride SA (K-DUR,KLOR-CON) 20 MEQ tablet Take 1 tablet by mouth 2 (two) times a day.    QUEtiapine (SEROQUEL) 50 MG tablet Take 1 tablet by mouth every evening.    RHOPRESSA 0.02 % ophthalmic solution Place 1 drop into both eyes once daily.    travoprost (TRAVATAN Z) 0.004 % ophthalmic solution Place 1 drop into both eyes once daily.       Review of patient's allergies indicates:  No Known Allergies    Past Medical History:   Diagnosis Date    Acute superficial gastritis without hemorrhage 2021    Diabetes mellitus     Esophageal dysphagia 2021    GERD (gastroesophageal reflux disease)     Hypertension     Low back pain     Lumbar radiculopathy     Seizures     Sleep apnea      Past Surgical History:   Procedure Laterality Date     SECTION      COLONOSCOPY  2018    Dr. Naranjo    EPIDURAL STEROID INJECTION  2018    L4-5 MARCOS X 5 - Cook    EPIDURAL STEROID INJECTION  2017    L5-S1 MARCOS - Cook    ESOPHAGOGASTRODUODENOSCOPY  2018    EYE SURGERY      HYSTERECTOMY      INJECTION OF FACET JOINT Bilateral 2017    Bilateral L3-S1 Facet Injection - Cook     Family History    None       Tobacco Use    Smoking status: Never Smoker   Substance and Sexual Activity    Alcohol use: Never    Drug use: Never    Sexual activity: Not on file     Review of Systems   Gastrointestinal: Positive for abdominal pain, nausea and vomiting.   All other systems reviewed and are negative.    Objective:     Vital Signs (Most Recent):  Temp: 98.6 °F (37 °C) (21 0934)  Pulse: 71 (21 0934)  Resp: 18 (21 1029)  BP: 139/85 (21 0934)  SpO2: 95 % (21 0934) Vital Signs (24h Range):  Temp:  [98.6 °F (37 °C)] 98.6 °F (37 °C)  Pulse:  [71] 71  Resp:  [18] 18  SpO2:  [95 %] 95 %  BP: (139)/(85) 139/85     Weight: 83.5 kg (184  lb)  Body mass index is 29.7 kg/m².    Physical Exam  Constitutional:       Appearance: Normal appearance.   HENT:      Head: Normocephalic.      Mouth/Throat:      Mouth: Mucous membranes are moist.   Eyes:      Conjunctiva/sclera: Conjunctivae normal.   Cardiovascular:      Rate and Rhythm: Normal rate and regular rhythm.   Pulmonary:      Effort: Pulmonary effort is normal.   Abdominal:      Palpations: Abdomen is soft.      Tenderness: There is abdominal tenderness. There is guarding.      Comments: RUQ   Musculoskeletal:         General: Normal range of motion.   Skin:     General: Skin is warm and dry.      Capillary Refill: Capillary refill takes less than 2 seconds.   Neurological:      Mental Status: She is alert.   Psychiatric:         Mood and Affect: Mood normal.         Significant Labs:  I have reviewed all pertinent lab results within the past 24 hours.  CBC:   Recent Labs   Lab 07/22/21  1015   WBC 6.59   RBC 3.89*   HGB 11.4*   HCT 33.2*      MCV 85.3   MCH 29.3   MCHC 34.3     BMP:   Recent Labs   Lab 07/22/21  1016   *      K 2.8*      CO2 34*   BUN 6*   CREATININE 0.71   CALCIUM 9.0     CMP:   Recent Labs   Lab 07/22/21  1016   *   CALCIUM 9.0   ALBUMIN 3.2*   PROT 7.4      K 2.8*   CO2 34*      BUN 6*   CREATININE 0.71   ALKPHOS 80   ALT 19   AST 12*   BILITOT 0.2     LFTs:   Recent Labs   Lab 07/22/21  1016   ALT 19   AST 12*   ALKPHOS 80   BILITOT 0.2   PROT 7.4   ALBUMIN 3.2*       Significant Diagnostics:  I have reviewed all pertinent imaging results/findings within the past 24 hours.    Assessment/Plan:     Calculus of gallbladder with acute cholecystitis without obstruction  07/22/2021 RUQ tenderness with guarding, gallstones on US today, with 0% EF onHIDA 2 weeks ago, normal LFTS  Laparoscopic cholecystectomy today per dr donovan risks benefits explained per dr donovan      VTE Risk Mitigation (From admission, onward)    Calin PATEL  SABAS Villareal  General Surgery  [facility] - Emergency Department

## 2021-07-22 NOTE — OP NOTE
[facility] - Periop Services  General Surgery  Operative Note    SUMMARY     Date of Procedure: 7/22/2021     Procedure: Procedure(s) (LRB):  CHOLECYSTECTOMY, LAPAROSCOPIC (N/A)       Surgeon(s) and Role:     * Amy Wagner MD - Primary    Assisting Surgeon: None    Pre-Operative Diagnosis: Calculus of gallbladder with acute cholecystitis without obstruction [K80.00]    Post-Operative Diagnosis: Post-Op Diagnosis Codes:     * Calculus of gallbladder with acute cholecystitis without obstruction [K80.00]    Anesthesia: General    Operative Findings (including complications, if any):  Cholelithiasis    Description of Technical Procedures:  Taken the operative suite.  Abdomen prepped draped.  Infiltrated all incisions local anesthetic.  Preop antibiotics given.  Curvilinear incision made and superior to the umbilicus.  Lateral stay sutures in the fascia.  Sharply in the enema.  Blunt-tipped trocar placed.  Pneumoperitoneum assured.  Feet down left lateral position.  Three, 5 mm trocars were placed the right subcostal region.  Gallbladder grasped retracted cephalad.  The peritoneum was scored.  We identified the infundibular cystic duct junction the cystic artery.  One clip proximal in cystic duct we made a partial cholecystic ductotomy we milked back the cystic duct small particulate bilious fluid removed and aspirated.  Two clips distally transected this structure.  Two clips were placed on the cystic duct transected gallbladder was taken down retrograde fashion placed in Endo-Catch bag and aspirated.  Removed from the umbilicus inspected the gallbladder fossa felt to have hemostasis irrigated and aspirated all fluid.  All trocars removed fascial defect closed with 0 Vicryl skin closed with staples    Significant Surgical Tasks Conducted by the Assistant(s), if Applicable:  Staples    Estimated Blood Loss (EBL): * No values recorded between 7/22/2021  3:10 PM and 7/22/2021  3:48 PM *           Implants: * No  implants in log *    Specimens:   Specimen (24h ago, onward)             Start     Ordered    07/22/21 1529  Surgical Pathology  RELEASE UPON ORDERING         07/22/21 1529                        Condition: Good    Disposition: PACU - hemodynamically stable.    Attestation: I was present for the entire procedure.

## 2021-07-22 NOTE — ED TRIAGE NOTES
Pt states her gallbladder is giving her trouble. Pt reports abdominal pain that can be sharp/achy/dull rated a 10 on 0-10 scale

## 2021-07-23 VITALS
BODY MASS INDEX: 29.57 KG/M2 | RESPIRATION RATE: 18 BRPM | HEIGHT: 66 IN | WEIGHT: 184 LBS | SYSTOLIC BLOOD PRESSURE: 143 MMHG | OXYGEN SATURATION: 98 % | DIASTOLIC BLOOD PRESSURE: 75 MMHG | HEART RATE: 56 BPM | TEMPERATURE: 98 F

## 2021-07-23 PROBLEM — K80.00 CALCULUS OF GALLBLADDER WITH ACUTE CHOLECYSTITIS WITHOUT OBSTRUCTION: Status: RESOLVED | Noted: 2021-07-22 | Resolved: 2021-07-23

## 2021-07-23 LAB
ALBUMIN SERPL BCP-MCNC: 2.5 G/DL (ref 3.5–5)
ALBUMIN/GLOB SERPL: 0.7 {RATIO}
ALP SERPL-CCNC: 69 U/L (ref 46–118)
ALT SERPL W P-5'-P-CCNC: 21 U/L (ref 13–56)
ANION GAP SERPL CALCULATED.3IONS-SCNC: 12 MMOL/L (ref 7–16)
AST SERPL W P-5'-P-CCNC: 19 U/L (ref 15–37)
BASOPHILS # BLD AUTO: 0.02 K/UL (ref 0–0.2)
BASOPHILS NFR BLD AUTO: 0.3 % (ref 0–1)
BILIRUB SERPL-MCNC: 0.3 MG/DL (ref 0–1.2)
BUN SERPL-MCNC: 9 MG/DL (ref 7–18)
BUN/CREAT SERPL: 14 (ref 6–20)
CALCIUM SERPL-MCNC: 7.8 MG/DL (ref 8.5–10.1)
CHLORIDE SERPL-SCNC: 106 MMOL/L (ref 98–107)
CO2 SERPL-SCNC: 29 MMOL/L (ref 21–32)
CREAT SERPL-MCNC: 0.66 MG/DL (ref 0.55–1.02)
DIFFERENTIAL METHOD BLD: ABNORMAL
EOSINOPHIL # BLD AUTO: 0.05 K/UL (ref 0–0.5)
EOSINOPHIL NFR BLD AUTO: 0.8 % (ref 1–4)
ERYTHROCYTE [DISTWIDTH] IN BLOOD BY AUTOMATED COUNT: 12.9 % (ref 11.5–14.5)
GLOBULIN SER-MCNC: 3.8 G/DL (ref 2–4)
GLUCOSE SERPL-MCNC: 90 MG/DL (ref 74–106)
HCT VFR BLD AUTO: 32.5 % (ref 38–47)
HGB BLD-MCNC: 10.4 G/DL (ref 12–16)
IMM GRANULOCYTES # BLD AUTO: 0.02 K/UL (ref 0–0.04)
IMM GRANULOCYTES NFR BLD: 0.3 % (ref 0–0.4)
LYMPHOCYTES # BLD AUTO: 1.58 K/UL (ref 1–4.8)
LYMPHOCYTES NFR BLD AUTO: 25.8 % (ref 27–41)
MCH RBC QN AUTO: 28.2 PG (ref 27–31)
MCHC RBC AUTO-ENTMCNC: 32 G/DL (ref 32–36)
MCV RBC AUTO: 88.1 FL (ref 80–96)
MONOCYTES # BLD AUTO: 0.36 K/UL (ref 0–0.8)
MONOCYTES NFR BLD AUTO: 5.9 % (ref 2–6)
MPC BLD CALC-MCNC: 9.5 FL (ref 9.4–12.4)
NEUTROPHILS # BLD AUTO: 4.09 K/UL (ref 1.8–7.7)
NEUTROPHILS NFR BLD AUTO: 66.9 % (ref 53–65)
NRBC # BLD AUTO: 0 X10E3/UL
NRBC, AUTO (.00): 0 %
PLATELET # BLD AUTO: 298 K/UL (ref 150–400)
POTASSIUM SERPL-SCNC: 2.9 MMOL/L (ref 3.5–5.1)
PROT SERPL-MCNC: 6.3 G/DL (ref 6.4–8.2)
RBC # BLD AUTO: 3.69 M/UL (ref 4.2–5.4)
SODIUM SERPL-SCNC: 144 MMOL/L (ref 136–145)
WBC # BLD AUTO: 6.12 K/UL (ref 4.5–11)

## 2021-07-23 PROCEDURE — 96376 TX/PRO/DX INJ SAME DRUG ADON: CPT

## 2021-07-23 PROCEDURE — G0378 HOSPITAL OBSERVATION PER HR: HCPCS

## 2021-07-23 PROCEDURE — 27000221 HC OXYGEN, UP TO 24 HOURS

## 2021-07-23 PROCEDURE — 36415 COLL VENOUS BLD VENIPUNCTURE: CPT | Performed by: NURSE PRACTITIONER

## 2021-07-23 PROCEDURE — 96372 THER/PROPH/DIAG INJ SC/IM: CPT | Mod: 59

## 2021-07-23 PROCEDURE — 63600175 PHARM REV CODE 636 W HCPCS: Performed by: NURSE PRACTITIONER

## 2021-07-23 PROCEDURE — 25000003 PHARM REV CODE 250: Performed by: NURSE PRACTITIONER

## 2021-07-23 PROCEDURE — 85025 COMPLETE CBC W/AUTO DIFF WBC: CPT | Performed by: NURSE PRACTITIONER

## 2021-07-23 PROCEDURE — 80053 COMPREHEN METABOLIC PANEL: CPT | Performed by: NURSE PRACTITIONER

## 2021-07-23 RX ORDER — HYDROCODONE BITARTRATE AND ACETAMINOPHEN 7.5; 325 MG/1; MG/1
1 TABLET ORAL EVERY 6 HOURS PRN
Qty: 12 TABLET | Refills: 0 | Status: SHIPPED | OUTPATIENT
Start: 2021-07-23 | End: 2022-01-05

## 2021-07-23 RX ADMIN — SODIUM CHLORIDE, POTASSIUM CHLORIDE, SODIUM LACTATE AND CALCIUM CHLORIDE: 600; 310; 30; 20 INJECTION, SOLUTION INTRAVENOUS at 03:07

## 2021-07-23 RX ADMIN — ENOXAPARIN SODIUM 40 MG: 40 INJECTION SUBCUTANEOUS at 08:07

## 2021-07-23 RX ADMIN — HYDROCODONE BITARTRATE AND ACETAMINOPHEN 1 TABLET: 7.5; 325 TABLET ORAL at 08:07

## 2021-07-23 RX ADMIN — CARVEDILOL 12.5 MG: 12.5 TABLET, FILM COATED ORAL at 08:07

## 2021-07-23 RX ADMIN — KETOROLAC TROMETHAMINE 15 MG: 30 INJECTION, SOLUTION INTRAMUSCULAR at 03:07

## 2021-07-23 NOTE — DISCHARGE SUMMARY
[facility]  Discharge Note  Short Stay    Procedure(s) (LRB):  CHOLECYSTECTOMY, LAPAROSCOPIC (N/A)    OUTCOME: Patient tolerated treatment/procedure well without complication and is now ready for discharge.    DISPOSITION: Home or Self Care    FINAL DIAGNOSIS:  Calculus of gallbladder with acute cholecystitis without obstruction    FOLLOWUP: In clinic    DISCHARGE INSTRUCTIONS:  No discharge procedures on file.

## 2021-07-23 NOTE — DISCHARGE SUMMARY
[facility] - [facility]  General Surgery  Discharge Summary      Patient Name: Audrey Causey  MRN: [id number]  Admission Date: 7/22/2021  Hospital Length of Stay: 0 days  Discharge Date and Time:  07/23/2021 9:50 AM  Attending Physician: Amy Wagner MD   Discharging Provider: Amy Wagner MD  Primary Care Provider: Carissa Barker MD    HPI:   57 yo female with HTN,  abdominal pain HIDA 2 weeks ordered by GI with EF 0%, Gallbladder not clearly visualized, presented to ED today with RUQ pain, n/v, no leukocytosis, gallstones on gallbladder us, hypokalemia 2.8 received k supplement, patient reports fully vaccinated  Dr. Wagner consulted for surgical management      Procedure(s) (LRB):  CHOLECYSTECTOMY, LAPAROSCOPIC (N/A)      Indwelling Lines/Drains at time of discharge:   Lines/Drains/Airways     None               Hospital Course: Patient admitted underwent cholecystectomy currently doing well pain-free tolerating diet vitals good will discharge home      Goals of Care Treatment Preferences:  Code Status: Full Code      Consults:     Significant Diagnostic Studies: Labs:   CMP   Recent Labs   Lab 07/22/21  1016 07/22/21  1200 07/23/21  0517     --  144   K 2.8* 2.8* 2.9*     --  106   CO2 34*  --  29   *  --  90   BUN 6*  --  9   CREATININE 0.71  --  0.66   CALCIUM 9.0  --  7.8*   PROT 7.4  --  6.3*   ALBUMIN 3.2*  --  2.5*   BILITOT 0.2  --  0.3   ALKPHOS 80  --  69   AST 12*  --  19   ALT 19  --  21   ANIONGAP 10  --  12   ESTGFRAFRICA 110  --  119    and CBC   Recent Labs   Lab 07/22/21  1015 07/22/21  1015 07/23/21  0517   WBC 6.59  --  6.12   HGB 11.4*  --  10.4*   HCT 33.2*   < > 32.5*     --  298    < > = values in this interval not displayed.       Pending Diagnostic Studies:     Procedure Component Value Units Date/Time    EXTRA TUBES [[id number]] Collected: 07/22/21 1015    Order Status: Sent Lab Status: In process Updated: 07/22/21 1017    Specimen: Blood,  Venous     Narrative:      The following orders were created for panel order EXTRA TUBES.  Procedure                               Abnormality         Status                     ---------                               -----------         ------                     Gold Top Hold[[id number]]                                    In process                   Please view results for these tests on the individual orders.    Surgical Pathology [[id number]] Collected: 07/22/21 1529    Order Status: Sent Lab Status: No result     Specimen: Tissue from Gallbladder         Final Active Diagnoses:    Diagnosis Date Noted POA    PRINCIPAL PROBLEM:  Calculus of gallbladder with acute cholecystitis without obstruction [K80.00] 07/22/2021 Yes      Problems Resolved During this Admission:      Discharged Condition: good    Disposition: Home or Self Care    Follow Up:    Patient Instructions:   No discharge procedures on file.  Medications:  Reconciled Home Medications:      Medication List      ASK your doctor about these medications    busPIRone 15 MG tablet  Commonly known as: BUSPAR  Take 1 tablet by mouth once daily.     carvediloL 12.5 MG tablet  Commonly known as: COREG  Take 1 tablet by mouth once daily.     citalopram 20 MG tablet  Commonly known as: CeleXA  Take 1 tablet by mouth once daily.     dorzolamide-timolol 2-0.5% 22.3-6.8 mg/mL ophthalmic solution  Commonly known as: COSOPT  Place 1 drop into both eyes 2 (two) times a day.     estradioL 2 MG tablet  Commonly known as: ESTRACE  Take 1 tablet by mouth once daily.     gabapentin 400 MG capsule  Commonly known as: NEURONTIN  Take 1 capsule by mouth 2 (two) times a day.     hydrALAZINE 100 MG tablet  Commonly known as: APRESOLINE  Take 1 tablet by mouth 3 (three) times daily.     lisinopriL-hydrochlorothiazide 20-25 mg Tab  Commonly known as: PRINZIDE,ZESTORETIC  Take 1 tablet by mouth once daily.     metFORMIN 1000 MG tablet  Commonly known as: GLUCOPHAGE  Take 1 tablet  by mouth 2 (two) times a day.     metoclopramide HCl 10 MG tablet  Commonly known as: REGLAN  Take 1 tablet by mouth 4 (four) times daily before meals and nightly.     metroNIDAZOLE 500 MG tablet  Commonly known as: FLAGYL  Take 1 tablet by mouth 2 (two) times a day.     omeprazole 40 MG capsule  Commonly known as: PRILOSEC  Take 1 capsule by mouth 2 (two) times a day.     oxyCODONE-acetaminophen  mg per tablet  Commonly known as: PERCOCET  Take 1 tablet by mouth every 12 (twelve) hours as needed.     potassium chloride SA 20 MEQ tablet  Commonly known as: K-DUR,KLOR-CON  Take 1 tablet by mouth 2 (two) times a day.     QUEtiapine 50 MG tablet  Commonly known as: SEROQUEL  Take 1 tablet by mouth every evening.     RHOPRESSA 0.02 % ophthalmic solution  Generic drug: netarsudiL  Place 1 drop into both eyes once daily.     travoprost 0.004 % ophthalmic solution  Commonly known as: TRAVATAN Z  Place 1 drop into both eyes once daily.          Time spent on the discharge of patient: 20 minutes    Amy Wagner MD  General Surgery  [address]

## 2021-07-23 NOTE — NURSING
PT discharged from [facility] rm 651, no complaints no distress noted pt wheeled out by wheel chair to waiting vehicle driven by her spouse pt belongs went with pt , cell phone,  .

## 2021-07-23 NOTE — HOSPITAL COURSE
Patient admitted underwent cholecystectomy currently doing well pain-free tolerating diet vitals good will discharge home

## 2021-07-26 LAB
ESTROGEN SERPL-MCNC: NORMAL PG/ML
LAB AP GROSS DESCRIPTION: NORMAL
LAB AP LABORATORY NOTES: NORMAL
T3RU NFR SERPL: NORMAL %

## 2021-08-03 ENCOUNTER — OFFICE VISIT (OUTPATIENT)
Dept: SURGERY | Facility: CLINIC | Age: 56
End: 2021-08-03
Payer: COMMERCIAL

## 2021-08-03 DIAGNOSIS — Z09 SURGERY FOLLOW-UP: Primary | ICD-10-CM

## 2021-08-03 PROCEDURE — 99213 OFFICE O/P EST LOW 20 MIN: CPT | Mod: PBBFAC | Performed by: SURGERY

## 2021-08-03 PROCEDURE — 1159F PR MEDICATION LIST DOCUMENTED IN MEDICAL RECORD: ICD-10-PCS | Mod: CPTII,,, | Performed by: SURGERY

## 2021-08-03 PROCEDURE — 1159F MED LIST DOCD IN RCRD: CPT | Mod: CPTII,,, | Performed by: SURGERY

## 2021-08-03 PROCEDURE — 1160F PR REVIEW ALL MEDS BY PRESCRIBER/CLIN PHARMACIST DOCUMENTED: ICD-10-PCS | Mod: CPTII,,, | Performed by: SURGERY

## 2021-08-03 PROCEDURE — 99024 POSTOP FOLLOW-UP VISIT: CPT | Mod: ,,, | Performed by: SURGERY

## 2021-08-03 PROCEDURE — 1160F RVW MEDS BY RX/DR IN RCRD: CPT | Mod: CPTII,,, | Performed by: SURGERY

## 2021-08-03 PROCEDURE — 99024 PR POST-OP FOLLOW-UP VISIT: ICD-10-PCS | Mod: ,,, | Performed by: SURGERY

## 2021-08-16 ENCOUNTER — OFFICE VISIT (OUTPATIENT)
Dept: GASTROENTEROLOGY | Facility: CLINIC | Age: 56
End: 2021-08-16
Payer: COMMERCIAL

## 2021-08-16 VITALS
BODY MASS INDEX: 29.57 KG/M2 | SYSTOLIC BLOOD PRESSURE: 138 MMHG | OXYGEN SATURATION: 95 % | WEIGHT: 184 LBS | HEIGHT: 66 IN | HEART RATE: 86 BPM | DIASTOLIC BLOOD PRESSURE: 75 MMHG

## 2021-08-16 DIAGNOSIS — Z86.010 HISTORY OF COLON POLYPS: Primary | ICD-10-CM

## 2021-08-16 DIAGNOSIS — K29.00 ACUTE SUPERFICIAL GASTRITIS WITHOUT HEMORRHAGE: Primary | ICD-10-CM

## 2021-08-16 DIAGNOSIS — R10.13 EPIGASTRIC PAIN: ICD-10-CM

## 2021-08-16 DIAGNOSIS — R13.19 ESOPHAGEAL DYSPHAGIA: ICD-10-CM

## 2021-08-16 PROCEDURE — 3075F SYST BP GE 130 - 139MM HG: CPT | Mod: CPTII,,, | Performed by: NURSE PRACTITIONER

## 2021-08-16 PROCEDURE — 1160F RVW MEDS BY RX/DR IN RCRD: CPT | Mod: CPTII,,, | Performed by: NURSE PRACTITIONER

## 2021-08-16 PROCEDURE — 3008F BODY MASS INDEX DOCD: CPT | Mod: CPTII,,, | Performed by: NURSE PRACTITIONER

## 2021-08-16 PROCEDURE — 3008F PR BODY MASS INDEX (BMI) DOCUMENTED: ICD-10-PCS | Mod: CPTII,,, | Performed by: NURSE PRACTITIONER

## 2021-08-16 PROCEDURE — 3078F DIAST BP <80 MM HG: CPT | Mod: CPTII,,, | Performed by: NURSE PRACTITIONER

## 2021-08-16 PROCEDURE — 1160F PR REVIEW ALL MEDS BY PRESCRIBER/CLIN PHARMACIST DOCUMENTED: ICD-10-PCS | Mod: CPTII,,, | Performed by: NURSE PRACTITIONER

## 2021-08-16 PROCEDURE — 3078F PR MOST RECENT DIASTOLIC BLOOD PRESSURE < 80 MM HG: ICD-10-PCS | Mod: CPTII,,, | Performed by: NURSE PRACTITIONER

## 2021-08-16 PROCEDURE — 99214 OFFICE O/P EST MOD 30 MIN: CPT | Mod: ,,, | Performed by: NURSE PRACTITIONER

## 2021-08-16 PROCEDURE — 1159F PR MEDICATION LIST DOCUMENTED IN MEDICAL RECORD: ICD-10-PCS | Mod: CPTII,,, | Performed by: NURSE PRACTITIONER

## 2021-08-16 PROCEDURE — 1159F MED LIST DOCD IN RCRD: CPT | Mod: CPTII,,, | Performed by: NURSE PRACTITIONER

## 2021-08-16 PROCEDURE — 99214 PR OFFICE/OUTPT VISIT, EST, LEVL IV, 30-39 MIN: ICD-10-PCS | Mod: ,,, | Performed by: NURSE PRACTITIONER

## 2021-08-16 PROCEDURE — 3075F PR MOST RECENT SYSTOLIC BLOOD PRESS GE 130-139MM HG: ICD-10-PCS | Mod: CPTII,,, | Performed by: NURSE PRACTITIONER

## 2021-09-07 DIAGNOSIS — Z11.59 SPECIAL SCREENING EXAMINATION FOR VIRAL DISEASE: Primary | ICD-10-CM

## 2021-09-20 ENCOUNTER — ANESTHESIA (OUTPATIENT)
Dept: GASTROENTEROLOGY | Facility: HOSPITAL | Age: 56
End: 2021-09-20
Payer: COMMERCIAL

## 2021-09-20 ENCOUNTER — ANESTHESIA EVENT (OUTPATIENT)
Dept: GASTROENTEROLOGY | Facility: HOSPITAL | Age: 56
End: 2021-09-20
Payer: COMMERCIAL

## 2021-09-20 ENCOUNTER — HOSPITAL ENCOUNTER (OUTPATIENT)
Dept: GASTROENTEROLOGY | Facility: HOSPITAL | Age: 56
Discharge: HOME | End: 2021-09-20
Attending: INTERNAL MEDICINE
Payer: MEDICARE

## 2021-09-20 VITALS
OXYGEN SATURATION: 100 % | HEART RATE: 68 BPM | SYSTOLIC BLOOD PRESSURE: 169 MMHG | WEIGHT: 187 LBS | DIASTOLIC BLOOD PRESSURE: 92 MMHG | BODY MASS INDEX: 30.18 KG/M2 | TEMPERATURE: 98 F | RESPIRATION RATE: 12 BRPM

## 2021-09-20 DIAGNOSIS — Z86.010 HISTORY OF COLON POLYPS: ICD-10-CM

## 2021-09-20 DIAGNOSIS — K57.30 DIVERTICULA, COLON: ICD-10-CM

## 2021-09-20 LAB — GLUCOSE SERPL-MCNC: 93 MG/DL (ref 70–110)

## 2021-09-20 PROCEDURE — 63600175 PHARM REV CODE 636 W HCPCS: Performed by: NURSE ANESTHETIST, CERTIFIED REGISTERED

## 2021-09-20 PROCEDURE — G0105 COLORECTAL SCRN; HI RISK IND: HCPCS

## 2021-09-20 PROCEDURE — 37000008 HC ANESTHESIA 1ST 15 MINUTES

## 2021-09-20 PROCEDURE — 27201423 OPTIME MED/SURG SUP & DEVICES STERILE SUPPLY

## 2021-09-20 PROCEDURE — D9220A PRA ANESTHESIA: ICD-10-PCS | Mod: ,,, | Performed by: NURSE ANESTHETIST, CERTIFIED REGISTERED

## 2021-09-20 PROCEDURE — 82962 GLUCOSE BLOOD TEST: CPT

## 2021-09-20 PROCEDURE — 37000009 HC ANESTHESIA EA ADD 15 MINS

## 2021-09-20 PROCEDURE — D9220A PRA ANESTHESIA: Mod: ,,, | Performed by: NURSE ANESTHETIST, CERTIFIED REGISTERED

## 2021-09-20 PROCEDURE — 27000284 HC CANNULA NASAL: Performed by: NURSE ANESTHETIST, CERTIFIED REGISTERED

## 2021-09-20 PROCEDURE — 25000003 PHARM REV CODE 250: Performed by: NURSE ANESTHETIST, CERTIFIED REGISTERED

## 2021-09-20 PROCEDURE — 45378 DIAGNOSTIC COLONOSCOPY: CPT | Mod: 33

## 2021-09-20 RX ORDER — SODIUM CHLORIDE 0.9 % (FLUSH) 0.9 %
10 SYRINGE (ML) INJECTION
Status: DISCONTINUED | OUTPATIENT
Start: 2021-09-20 | End: 2021-09-21 | Stop reason: HOSPADM

## 2021-09-20 RX ORDER — PROPOFOL 10 MG/ML
VIAL (ML) INTRAVENOUS
Status: DISCONTINUED | OUTPATIENT
Start: 2021-09-20 | End: 2021-09-20

## 2021-09-20 RX ORDER — LIDOCAINE HYDROCHLORIDE 20 MG/ML
INJECTION, SOLUTION EPIDURAL; INFILTRATION; INTRACAUDAL; PERINEURAL
Status: DISCONTINUED | OUTPATIENT
Start: 2021-09-20 | End: 2021-09-20

## 2021-09-20 RX ADMIN — PROPOFOL 50 MG: 10 INJECTION, EMULSION INTRAVENOUS at 08:09

## 2021-09-20 RX ADMIN — PROPOFOL 100 MG: 10 INJECTION, EMULSION INTRAVENOUS at 08:09

## 2021-09-20 RX ADMIN — LIDOCAINE HYDROCHLORIDE 50 MG: 20 INJECTION, SOLUTION EPIDURAL; INFILTRATION; INTRACAUDAL; PERINEURAL at 08:09

## 2021-09-20 RX ADMIN — SODIUM CHLORIDE: 9 INJECTION, SOLUTION INTRAVENOUS at 08:09

## 2021-09-20 RX ADMIN — PROPOFOL 50 MG: 10 INJECTION, EMULSION INTRAVENOUS at 09:09

## 2021-12-20 ENCOUNTER — HOSPITAL ENCOUNTER (OUTPATIENT)
Dept: RADIOLOGY | Facility: HOSPITAL | Age: 56
Discharge: HOME | End: 2021-12-20
Attending: PAIN MEDICINE
Payer: COMMERCIAL

## 2021-12-20 ENCOUNTER — OFFICE VISIT (OUTPATIENT)
Dept: PAIN MEDICINE | Facility: CLINIC | Age: 56
End: 2021-12-20
Payer: COMMERCIAL

## 2021-12-20 VITALS
HEIGHT: 66 IN | BODY MASS INDEX: 30.37 KG/M2 | WEIGHT: 189 LBS | DIASTOLIC BLOOD PRESSURE: 94 MMHG | HEART RATE: 89 BPM | SYSTOLIC BLOOD PRESSURE: 171 MMHG

## 2021-12-20 DIAGNOSIS — M25.561 ACUTE PAIN OF RIGHT KNEE: ICD-10-CM

## 2021-12-20 DIAGNOSIS — G89.29 CHRONIC MIDLINE LOW BACK PAIN WITH BILATERAL SCIATICA: ICD-10-CM

## 2021-12-20 DIAGNOSIS — Z79.899 OTHER LONG TERM (CURRENT) DRUG THERAPY: Primary | ICD-10-CM

## 2021-12-20 DIAGNOSIS — M54.42 CHRONIC MIDLINE LOW BACK PAIN WITH BILATERAL SCIATICA: ICD-10-CM

## 2021-12-20 DIAGNOSIS — M54.41 CHRONIC MIDLINE LOW BACK PAIN WITH BILATERAL SCIATICA: ICD-10-CM

## 2021-12-20 LAB

## 2021-12-20 PROCEDURE — 80305 DRUG TEST PRSMV DIR OPT OBS: CPT | Mod: PBBFAC | Performed by: PAIN MEDICINE

## 2021-12-20 PROCEDURE — 99203 OFFICE O/P NEW LOW 30 MIN: CPT | Mod: S$PBB,,, | Performed by: PAIN MEDICINE

## 2021-12-20 PROCEDURE — 72110 XR LUMBAR SPINE 5 VIEW WITH FLEX AND EXT: ICD-10-PCS | Mod: 26,,, | Performed by: RADIOLOGY

## 2021-12-20 PROCEDURE — 73560 XR KNEE 1 OR 2 VIEW RIGHT: ICD-10-PCS | Mod: 26,RT,, | Performed by: RADIOLOGY

## 2021-12-20 PROCEDURE — 4010F ACE/ARB THERAPY RXD/TAKEN: CPT | Mod: CPTII,,, | Performed by: PAIN MEDICINE

## 2021-12-20 PROCEDURE — 72110 X-RAY EXAM L-2 SPINE 4/>VWS: CPT | Mod: TC

## 2021-12-20 PROCEDURE — 73560 X-RAY EXAM OF KNEE 1 OR 2: CPT | Mod: 26,RT,, | Performed by: RADIOLOGY

## 2021-12-20 PROCEDURE — 4010F PR ACE/ARB THEARPY RXD/TAKEN: ICD-10-PCS | Mod: CPTII,,, | Performed by: PAIN MEDICINE

## 2021-12-20 PROCEDURE — 99203 PR OFFICE/OUTPT VISIT, NEW, LEVL III, 30-44 MIN: ICD-10-PCS | Mod: S$PBB,,, | Performed by: PAIN MEDICINE

## 2021-12-20 PROCEDURE — 72110 X-RAY EXAM L-2 SPINE 4/>VWS: CPT | Mod: 26,,, | Performed by: RADIOLOGY

## 2021-12-20 PROCEDURE — 73560 X-RAY EXAM OF KNEE 1 OR 2: CPT | Mod: TC,RT

## 2021-12-20 PROCEDURE — 99215 OFFICE O/P EST HI 40 MIN: CPT | Mod: PBBFAC | Performed by: PAIN MEDICINE

## 2021-12-23 PROBLEM — M47.817 LUMBOSACRAL SPONDYLOSIS WITHOUT MYELOPATHY: Chronic | Status: ACTIVE | Noted: 2021-12-23

## 2021-12-23 PROBLEM — G89.29 CHRONIC PAIN OF RIGHT KNEE: Chronic | Status: ACTIVE | Noted: 2021-12-23

## 2021-12-23 PROBLEM — M25.561 CHRONIC PAIN OF RIGHT KNEE: Chronic | Status: ACTIVE | Noted: 2021-12-23

## 2021-12-23 PROBLEM — M89.49 OSTEOARTHROSIS MULTIPLE SITES, NOT SPECIFIED AS GENERALIZED: Chronic | Status: ACTIVE | Noted: 2021-12-23

## 2021-12-23 NOTE — H&P (VIEW-ONLY)
Disclaimer:  This note has been generated using voice recognition software.  There may be type of graft focal areas that have been missed during a proof reading      Subjective:      Patient ID: Audrey Causey is a 56 y.o. female.    Chief Complaint: Medication Refill, Knee Pain, Leg Pain, Low-back Pain, and Foot Pain      Pain  This is a chronic problem. The current episode started more than 1 year ago. The problem occurs daily. The problem has been waxing and waning. Associated symptoms include arthralgias. Pertinent negatives include no change in bowel habit, chest pain, chills, coughing, diaphoresis, fever, neck pain, rash, sore throat, vertigo or vomiting.     Review of Systems   Constitutional: Negative for activity change, appetite change, chills, diaphoresis, fever and unexpected weight change.   HENT: Negative for drooling, ear discharge, ear pain, facial swelling, nosebleeds, sore throat, trouble swallowing, voice change and goiter.    Eyes: Negative for photophobia, pain, discharge, redness and visual disturbance.   Respiratory: Negative for apnea, cough, choking, chest tightness, shortness of breath, wheezing and stridor.    Cardiovascular: Negative for chest pain, palpitations and leg swelling.   Gastrointestinal: Negative for abdominal distention, change in bowel habit, diarrhea, rectal pain, vomiting, fecal incontinence and change in bowel habit.   Endocrine: Negative for cold intolerance, heat intolerance, polydipsia, polyphagia and polyuria.   Genitourinary: Negative for bladder incontinence, dysuria, flank pain, frequency and hot flashes.   Musculoskeletal: Positive for arthralgias, back pain and leg pain. Negative for neck pain and neck stiffness.   Integumentary:  Negative for color change, pallor and rash.   Allergic/Immunologic: Negative for immunocompromised state.   Neurological: Negative for dizziness, vertigo, seizures, syncope, facial asymmetry, speech difficulty, light-headedness,  "disturbances in coordination, memory loss and coordination difficulties.   Hematological: Negative for adenopathy. Does not bruise/bleed easily.   Psychiatric/Behavioral: Negative for agitation, behavioral problems, confusion, decreased concentration, dysphoric mood, hallucinations, self-injury and suicidal ideas. The patient is not nervous/anxious and is not hyperactive.             Objective:  Vitals:    01/05/22 1318   BP: (!) 186/92   Pulse: 66   Resp: 18   Weight: 87.5 kg (193 lb)   Height: 5' 6" (1.676 m)   PainSc:   8         Physical Exam  Vitals and nursing note reviewed. Exam conducted with a chaperone present.   Constitutional:       General: She is awake. She is not in acute distress.     Appearance: Normal appearance. She is not ill-appearing, toxic-appearing or diaphoretic.   HENT:      Head: Normocephalic and atraumatic.      Nose: Nose normal.      Mouth/Throat:      Mouth: Mucous membranes are moist.      Pharynx: Oropharynx is clear.   Eyes:      Conjunctiva/sclera: Conjunctivae normal.      Pupils: Pupils are equal, round, and reactive to light.   Cardiovascular:      Rate and Rhythm: Normal rate.   Pulmonary:      Effort: Pulmonary effort is normal. No respiratory distress.   Abdominal:      Palpations: Abdomen is soft.      Tenderness: There is no guarding.   Musculoskeletal:         General: No signs of injury. Normal range of motion.      Cervical back: Normal range of motion and neck supple. No rigidity.   Skin:     General: Skin is warm and dry.      Coloration: Skin is not jaundiced or pale.   Neurological:      General: No focal deficit present.      Mental Status: She is alert and oriented to person, place, and time. Mental status is at baseline.      Cranial Nerves: Cranial nerves are intact. No cranial nerve deficit (II-XII).   Psychiatric:         Mood and Affect: Mood normal.         Behavior: Behavior normal. Behavior is cooperative.         Thought Content: Thought content normal. "           X-Ray Lumbar Complete Including Flex And Ext  Narrative: EXAMINATION:  XR LUMBAR SPINE 5 VIEW WITH FLEX AND EXT    CLINICAL HISTORY:  Lumbago with sciatica, right side    TECHNIQUE:  Five views of the lumbar spine plus flexion extension views were performed.    COMPARISON:  04/23/2014    FINDINGS:  The vertebral body heights and alignment are maintained there are degenerative endplate changes at the thoracolumbar junction.  Degenerative facet changes seen from L3 through S1.  No fracture.  No change in alignment on dynamic imaging.  Impression: Degenerative changes    Electronically signed by: Chino Mckeon  Date:    12/20/2021  Time:    15:34  X-Ray Knee 1 or 2 View Right  Narrative: EXAMINATION:  XR KNEE 1 OR 2 VIEW RIGHT    CLINICAL HISTORY:  Pain in right knee    COMPARISON:  None    TECHNIQUE:  Frontal and lateral views of the right knee.    FINDINGS:  Mild spurring of the superior pole of the patella.  Moderate tricompartmental degenerative change of the knee.  No convincing acute fracture or dislocation demonstrated. No concerning radiopaque foreign body visualized.  Impression: As above.    Point of Service: [facility]    Electronically signed by: Sonny Clancy  Date:    12/20/2021  Time:    15:03       Office Visit on 12/20/2021   Component Date Value Ref Range Status    POC Amphetamines 12/20/2021 Negative  Negative, Inconclusive Final    POC Barbiturates 12/20/2021 Negative  Negative, Inconclusive Final    POC Benzodiazepines 12/20/2021 Negative  Negative, Inconclusive Final    POC Cocaine 12/20/2021 Negative  Negative, Inconclusive Final    POC THC 12/20/2021 Negative  Negative, Inconclusive Final    POC Methadone 12/20/2021 Negative  Negative, Inconclusive Final    POC Methamphetamine 12/20/2021 Negative  Negative, Inconclusive Final    POC Opiates 12/20/2021 Negative  Negative, Inconclusive Final    POC Oxycodone 12/20/2021 Negative  Negative, Inconclusive Final    POC  Phencyclidine 12/20/2021 Negative  Negative, Inconclusive Final    POC Methylenedioxymethamphetamine * 12/20/2021 Negative  Negative, Inconclusive Final    POC Tricyclic Antidepressants 12/20/2021 Negative  Negative, Inconclusive Final    POC Buprenorphine 12/20/2021 Negative   Final     Acceptable 12/20/2021 Yes   Final    POC Temperature (Urine) 12/20/2021 94   Final   Hospital Outpatient Visit on 09/20/2021   Component Date Value Ref Range Status    POC Glucose 09/20/2021 93  70 - 110 MG/DL Final   Lab Visit on 09/16/2021   Component Date Value Ref Range Status    POC Rapid COVID 09/16/2021 Negative  Negative Final     Acceptable 09/16/2021 Yes   Final   Admission on 07/22/2021, Discharged on 07/23/2021   Component Date Value Ref Range Status    POC Glucose 07/22/2021 108* 70 - 105 mg/dL Final    Sodium 07/22/2021 144  136 - 145 mmol/L Final    Potassium 07/22/2021 2.8* 3.5 - 5.1 mmol/L Final    Chloride 07/22/2021 103  98 - 107 mmol/L Final    CO2 07/22/2021 34* 21 - 32 mmol/L Final    Anion Gap 07/22/2021 10  7 - 16 mmol/L Final    Glucose 07/22/2021 107* 74 - 106 mg/dL Final    BUN 07/22/2021 6* 7 - 18 mg/dL Final    Creatinine 07/22/2021 0.71  0.55 - 1.02 mg/dL Final    BUN/Creatinine Ratio 07/22/2021 8  6 - 20 Final    Calcium 07/22/2021 9.0  8.5 - 10.1 mg/dL Final    Total Protein 07/22/2021 7.4  6.4 - 8.2 g/dL Final    Albumin 07/22/2021 3.2* 3.5 - 5.0 g/dL Final    Globulin 07/22/2021 4.2* 2.0 - 4.0 g/dL Final    A/G Ratio 07/22/2021 0.8   Final    Bilirubin, Total 07/22/2021 0.2  >0.0 - 1.2 mg/dL Final    Alk Phos 07/22/2021 80  46 - 118 U/L Final    ALT 07/22/2021 19  13 - 56 U/L Final    AST 07/22/2021 12* 15 - 37 U/L Final    eGFR  07/22/2021 110  >=60 mL/min/1.73m² Final    Lipase 07/22/2021 72* 73 - 393 U/L Final    WBC 07/22/2021 6.59  4.50 - 11.00 K/uL Final    RBC 07/22/2021 3.89* 4.20 - 5.40 M/uL Final    Hemoglobin  07/22/2021 11.4* 12.0 - 16.0 g/dL Final    Hematocrit 07/22/2021 33.2* 38.0 - 47.0 % Final    MCV 07/22/2021 85.3  80.0 - 96.0 fL Final    MCH 07/22/2021 29.3  27.0 - 31.0 pg Final    MCHC 07/22/2021 34.3  32.0 - 36.0 g/dL Final    RDW 07/22/2021 12.8  11.5 - 14.5 % Final    Platelet Count 07/22/2021 318  150 - 400 K/uL Final    MPV 07/22/2021 9.1* 9.4 - 12.4 fL Final    Neutrophils % 07/22/2021 53.3  53.0 - 65.0 % Final    Lymphocytes % 07/22/2021 39.8  27.0 - 41.0 % Final    Monocytes % 07/22/2021 5.9  2.0 - 6.0 % Final    Eosinophils % 07/22/2021 0.6* 1.0 - 4.0 % Final    Basophils % 07/22/2021 0.2  0.0 - 1.0 % Final    Immature Granulocytes % 07/22/2021 0.2  0.0 - 0.4 % Final    nRBC, Auto 07/22/2021 0.0  <=0.0 % Final    Neutrophils, Abs 07/22/2021 3.52  1.80 - 7.70 K/uL Final    Lymphocytes, Absolute 07/22/2021 2.62  1.00 - 4.80 K/uL Final    Monocytes, Absolute 07/22/2021 0.39  0.00 - 0.80 K/uL Final    Eosinophils, Absolute 07/22/2021 0.04  0.00 - 0.50 K/uL Final    Basophils, Absolute 07/22/2021 0.01  0.00 - 0.20 K/uL Final    Immature Granulocytes, Absolute 07/22/2021 0.01  0.00 - 0.04 K/uL Final    nRBC, Absolute 07/22/2021 0.00  <=0.00 x10e3/uL Final    Diff Type 07/22/2021 Auto   Final    Potassium 07/22/2021 2.8* 3.5 - 5.1 mmol/L Final    Final Diagnosis 07/22/2021    Final                    Value:This result contains rich text formatting which cannot be displayed here.    Gross Description 07/22/2021    Final                    Value:This result contains rich text formatting which cannot be displayed here.    Microscopic Description 07/22/2021    Final                    Value:This result contains rich text formatting which cannot be displayed here.    Laboratory Notes 07/22/2021    Final                    Value:This result contains rich text formatting which cannot be displayed here.    POC Glucose 07/22/2021 101  70 - 105 mg/dL Final    Sodium 07/23/2021 144  136 - 145  mmol/L Final    Potassium 07/23/2021 2.9* 3.5 - 5.1 mmol/L Final    Chloride 07/23/2021 106  98 - 107 mmol/L Final    CO2 07/23/2021 29  21 - 32 mmol/L Final    Anion Gap 07/23/2021 12  7 - 16 mmol/L Final    Glucose 07/23/2021 90  74 - 106 mg/dL Final    BUN 07/23/2021 9  7 - 18 mg/dL Final    Creatinine 07/23/2021 0.66  0.55 - 1.02 mg/dL Final    BUN/Creatinine Ratio 07/23/2021 14  6 - 20 Final    Calcium 07/23/2021 7.8* 8.5 - 10.1 mg/dL Final    Total Protein 07/23/2021 6.3* 6.4 - 8.2 g/dL Final    Albumin 07/23/2021 2.5* 3.5 - 5.0 g/dL Final    Globulin 07/23/2021 3.8  2.0 - 4.0 g/dL Final    A/G Ratio 07/23/2021 0.7   Final    Bilirubin, Total 07/23/2021 0.3  >0.0 - 1.2 mg/dL Final    Alk Phos 07/23/2021 69  46 - 118 U/L Final    ALT 07/23/2021 21  13 - 56 U/L Final    AST 07/23/2021 19  15 - 37 U/L Final    eGFR  07/23/2021 119  >=60 mL/min/1.73m² Final    WBC 07/23/2021 6.12  4.50 - 11.00 K/uL Final    RBC 07/23/2021 3.69* 4.20 - 5.40 M/uL Final    Hemoglobin 07/23/2021 10.4* 12.0 - 16.0 g/dL Final    Hematocrit 07/23/2021 32.5* 38.0 - 47.0 % Final    MCV 07/23/2021 88.1  80.0 - 96.0 fL Final    MCH 07/23/2021 28.2  27.0 - 31.0 pg Final    MCHC 07/23/2021 32.0  32.0 - 36.0 g/dL Final    RDW 07/23/2021 12.9  11.5 - 14.5 % Final    Platelet Count 07/23/2021 298  150 - 400 K/uL Final    MPV 07/23/2021 9.5  9.4 - 12.4 fL Final    Neutrophils % 07/23/2021 66.9* 53.0 - 65.0 % Final    Lymphocytes % 07/23/2021 25.8* 27.0 - 41.0 % Final    Monocytes % 07/23/2021 5.9  2.0 - 6.0 % Final    Eosinophils % 07/23/2021 0.8* 1.0 - 4.0 % Final    Basophils % 07/23/2021 0.3  0.0 - 1.0 % Final    Immature Granulocytes % 07/23/2021 0.3  0.0 - 0.4 % Final    nRBC, Auto 07/23/2021 0.0  <=0.0 % Final    Neutrophils, Abs 07/23/2021 4.09  1.80 - 7.70 K/uL Final    Lymphocytes, Absolute 07/23/2021 1.58  1.00 - 4.80 K/uL Final    Monocytes, Absolute 07/23/2021 0.36  0.00 - 0.80 K/uL  Final    Eosinophils, Absolute 07/23/2021 0.05  0.00 - 0.50 K/uL Final    Basophils, Absolute 07/23/2021 0.02  0.00 - 0.20 K/uL Final    Immature Granulocytes, Absolute 07/23/2021 0.02  0.00 - 0.04 K/uL Final    nRBC, Absolute 07/23/2021 0.00  <=0.00 x10e3/uL Final    Diff Type 07/23/2021 Auto   Final           Assessment:      1. Lumbar radiculopathy, chronic    2. Chronic pain of right knee    3. Osteoarthrosis multiple sites, not specified as generalized    4. Other long term (current) drug therapy          Orders Placed This Encounter   Procedures    Drug Screen Definitive 14, Urine     Standing Status:   Future     Number of Occurrences:   1     Standing Expiration Date:   3/6/2023     Order Specific Question:   Specimen Source     Answer:   Urine    Ambulatory referral/consult to Physical/Occupational Therapy     Standing Status:   Future     Standing Expiration Date:   2/5/2023     Referral Priority:   Routine     Referral Type:   Physical Medicine     Referral Reason:   Specialty Services Required     Requested Specialty:   Physical Therapy     Number of Visits Requested:   1    Case Request Operating Room: Injection, Steroid, Epidural, L4/5     Order Specific Question:   Medical Necessity:     Answer:   Medically Non-Urgent [100]     Order Specific Question:   CPT Code:     Answer:   VT INJ LUMBAR/SACRAL, W/IMAGING GUIDANCE [64874]     Order Specific Question:   Is an on-site pathologist required for this procedure?     Answer:   N/A         A's of Opioid Risk Assessment  Activity:Patient can perform ADL.   Analgesia:Patients pain is partially controlled by current medication. Patient has tried OTC medications such as Tylenol and Ibuprofen with out relief.   Adverse Effects: Patient denies constipation or sedation.  Aberrant Behavior:  reviewed with no aberrant drug seeking/taking behavior.  Overdose reversal drug naloxone discussed    Drug screen reviewed      Requested Prescriptions     Signed  Prescriptions Disp Refills    oxyCODONE-acetaminophen (PERCOCET)  mg per tablet 60 tablet 0     Sig: Take 1 tablet by mouth every 12 (twelve) hours.    gabapentin (NEURONTIN) 400 MG capsule 60 capsule 1     Sig: Take 1 capsule (400 mg total) by mouth every 12 (twelve) hours.    oxyCODONE-acetaminophen (PERCOCET)  mg per tablet 60 tablet 0     Sig: Take 1 tablet by mouth every 12 (twelve) hours.         Plan:    Complaint of back pain buttock and leg pain both legs pain numbness and tingling both legs worse with standing walking laughing or coughing better with short periods resting    History of lumbar epidural steroid injections she states it did help control her discomfort several years ago she is requesting repeat lumbar procedures    definitive drug screen December 20, 2021 Ms. Mendez due to clerical error    Will reorder definitive drug screen today    Presumptive drug screen was negative patient reported taking Percocet    X-ray right knee [facility] December 20, 2021, mild spurring changes   moderate tricompartmental degenerative changes  No fracture noted    X-ray lumbar spine [facility] December 20, 2021, degenerative changes noted no abnormal motion noted    Physical therapy prescription given to patient    Resume Percocet 10 1 p.o. q.12 hours    Continue gabapentin as directed    Indications for this procedure for this specific patient include the following     - Injections being provided as part of a comprehensive pain management program.    - Pt has failed 6 weeks of conservative therapy including oral meds, PT and or home exercise program which has been discussed with the patient   - Injection being provided for suspected radicular pain.    - Pain scale of greater than or equal to 3/10 with functional impairment  - No evidence of local or systemic infection, bleeding tendency or unstable medical condition.    - Pain is causing significant functional limitation resulting in  diminished quality of life and impaired age appropriate ADL's.   - Repeat injections are done no sooner than 7 days after the previous injection  - Epidural done for suspected radicular pain along dermatome of nerve   - Epidural done to differentiate level of radicular nerve root pain   - Repeat injections done only when pt reports 50% improvement in pain from previous injections    - Injection done at L4/5 level(s) which is consistent with patient's dermatomal pain complaint    Schedule lumbar L4/5 MARCOS # 1, lumbar radiculopathy    Dr. Soto,     Bring original prescription medication bottles/container/box with labels to each visit

## 2021-12-23 NOTE — PROGRESS NOTES
Disclaimer:  This note has been generated using voice recognition software.  There may be type of graft focal areas that have been missed during a proof reading      Subjective:      Patient ID: Audrey Causey is a 56 y.o. female.    Chief Complaint: Medication Refill, Knee Pain, Leg Pain, Low-back Pain, and Foot Pain      Pain  This is a chronic problem. The current episode started more than 1 year ago. The problem occurs daily. The problem has been waxing and waning. Associated symptoms include arthralgias. Pertinent negatives include no change in bowel habit, chest pain, chills, coughing, diaphoresis, fever, neck pain, rash, sore throat, vertigo or vomiting.     Review of Systems   Constitutional: Negative for activity change, appetite change, chills, diaphoresis, fever and unexpected weight change.   HENT: Negative for drooling, ear discharge, ear pain, facial swelling, nosebleeds, sore throat, trouble swallowing, voice change and goiter.    Eyes: Negative for photophobia, pain, discharge, redness and visual disturbance.   Respiratory: Negative for apnea, cough, choking, chest tightness, shortness of breath, wheezing and stridor.    Cardiovascular: Negative for chest pain, palpitations and leg swelling.   Gastrointestinal: Negative for abdominal distention, change in bowel habit, diarrhea, rectal pain, vomiting, fecal incontinence and change in bowel habit.   Endocrine: Negative for cold intolerance, heat intolerance, polydipsia, polyphagia and polyuria.   Genitourinary: Negative for bladder incontinence, dysuria, flank pain, frequency and hot flashes.   Musculoskeletal: Positive for arthralgias, back pain and leg pain. Negative for neck pain and neck stiffness.   Integumentary:  Negative for color change, pallor and rash.   Allergic/Immunologic: Negative for immunocompromised state.   Neurological: Negative for dizziness, vertigo, seizures, syncope, facial asymmetry, speech difficulty, light-headedness,  "disturbances in coordination, memory loss and coordination difficulties.   Hematological: Negative for adenopathy. Does not bruise/bleed easily.   Psychiatric/Behavioral: Negative for agitation, behavioral problems, confusion, decreased concentration, dysphoric mood, hallucinations, self-injury and suicidal ideas. The patient is not nervous/anxious and is not hyperactive.             Objective:  Vitals:    01/05/22 1318   BP: (!) 186/92   Pulse: 66   Resp: 18   Weight: 87.5 kg (193 lb)   Height: 5' 6" (1.676 m)   PainSc:   8         Physical Exam  Vitals and nursing note reviewed. Exam conducted with a chaperone present.   Constitutional:       General: She is awake. She is not in acute distress.     Appearance: Normal appearance. She is not ill-appearing, toxic-appearing or diaphoretic.   HENT:      Head: Normocephalic and atraumatic.      Nose: Nose normal.      Mouth/Throat:      Mouth: Mucous membranes are moist.      Pharynx: Oropharynx is clear.   Eyes:      Conjunctiva/sclera: Conjunctivae normal.      Pupils: Pupils are equal, round, and reactive to light.   Cardiovascular:      Rate and Rhythm: Normal rate.   Pulmonary:      Effort: Pulmonary effort is normal. No respiratory distress.   Abdominal:      Palpations: Abdomen is soft.      Tenderness: There is no guarding.   Musculoskeletal:         General: No signs of injury. Normal range of motion.      Cervical back: Normal range of motion and neck supple. No rigidity.   Skin:     General: Skin is warm and dry.      Coloration: Skin is not jaundiced or pale.   Neurological:      General: No focal deficit present.      Mental Status: She is alert and oriented to person, place, and time. Mental status is at baseline.      Cranial Nerves: Cranial nerves are intact. No cranial nerve deficit (II-XII).   Psychiatric:         Mood and Affect: Mood normal.         Behavior: Behavior normal. Behavior is cooperative.         Thought Content: Thought content normal. "           X-Ray Lumbar Complete Including Flex And Ext  Narrative: EXAMINATION:  XR LUMBAR SPINE 5 VIEW WITH FLEX AND EXT    CLINICAL HISTORY:  Lumbago with sciatica, right side    TECHNIQUE:  Five views of the lumbar spine plus flexion extension views were performed.    COMPARISON:  04/23/2014    FINDINGS:  The vertebral body heights and alignment are maintained there are degenerative endplate changes at the thoracolumbar junction.  Degenerative facet changes seen from L3 through S1.  No fracture.  No change in alignment on dynamic imaging.  Impression: Degenerative changes    Electronically signed by: Chino Mckeon  Date:    12/20/2021  Time:    15:34  X-Ray Knee 1 or 2 View Right  Narrative: EXAMINATION:  XR KNEE 1 OR 2 VIEW RIGHT    CLINICAL HISTORY:  Pain in right knee    COMPARISON:  None    TECHNIQUE:  Frontal and lateral views of the right knee.    FINDINGS:  Mild spurring of the superior pole of the patella.  Moderate tricompartmental degenerative change of the knee.  No convincing acute fracture or dislocation demonstrated. No concerning radiopaque foreign body visualized.  Impression: As above.    Point of Service: [facility]    Electronically signed by: Sonny Clancy  Date:    12/20/2021  Time:    15:03       Office Visit on 12/20/2021   Component Date Value Ref Range Status    POC Amphetamines 12/20/2021 Negative  Negative, Inconclusive Final    POC Barbiturates 12/20/2021 Negative  Negative, Inconclusive Final    POC Benzodiazepines 12/20/2021 Negative  Negative, Inconclusive Final    POC Cocaine 12/20/2021 Negative  Negative, Inconclusive Final    POC THC 12/20/2021 Negative  Negative, Inconclusive Final    POC Methadone 12/20/2021 Negative  Negative, Inconclusive Final    POC Methamphetamine 12/20/2021 Negative  Negative, Inconclusive Final    POC Opiates 12/20/2021 Negative  Negative, Inconclusive Final    POC Oxycodone 12/20/2021 Negative  Negative, Inconclusive Final    POC  Phencyclidine 12/20/2021 Negative  Negative, Inconclusive Final    POC Methylenedioxymethamphetamine * 12/20/2021 Negative  Negative, Inconclusive Final    POC Tricyclic Antidepressants 12/20/2021 Negative  Negative, Inconclusive Final    POC Buprenorphine 12/20/2021 Negative   Final     Acceptable 12/20/2021 Yes   Final    POC Temperature (Urine) 12/20/2021 94   Final   Hospital Outpatient Visit on 09/20/2021   Component Date Value Ref Range Status    POC Glucose 09/20/2021 93  70 - 110 MG/DL Final   Lab Visit on 09/16/2021   Component Date Value Ref Range Status    POC Rapid COVID 09/16/2021 Negative  Negative Final     Acceptable 09/16/2021 Yes   Final   Admission on 07/22/2021, Discharged on 07/23/2021   Component Date Value Ref Range Status    POC Glucose 07/22/2021 108* 70 - 105 mg/dL Final    Sodium 07/22/2021 144  136 - 145 mmol/L Final    Potassium 07/22/2021 2.8* 3.5 - 5.1 mmol/L Final    Chloride 07/22/2021 103  98 - 107 mmol/L Final    CO2 07/22/2021 34* 21 - 32 mmol/L Final    Anion Gap 07/22/2021 10  7 - 16 mmol/L Final    Glucose 07/22/2021 107* 74 - 106 mg/dL Final    BUN 07/22/2021 6* 7 - 18 mg/dL Final    Creatinine 07/22/2021 0.71  0.55 - 1.02 mg/dL Final    BUN/Creatinine Ratio 07/22/2021 8  6 - 20 Final    Calcium 07/22/2021 9.0  8.5 - 10.1 mg/dL Final    Total Protein 07/22/2021 7.4  6.4 - 8.2 g/dL Final    Albumin 07/22/2021 3.2* 3.5 - 5.0 g/dL Final    Globulin 07/22/2021 4.2* 2.0 - 4.0 g/dL Final    A/G Ratio 07/22/2021 0.8   Final    Bilirubin, Total 07/22/2021 0.2  >0.0 - 1.2 mg/dL Final    Alk Phos 07/22/2021 80  46 - 118 U/L Final    ALT 07/22/2021 19  13 - 56 U/L Final    AST 07/22/2021 12* 15 - 37 U/L Final    eGFR  07/22/2021 110  >=60 mL/min/1.73m² Final    Lipase 07/22/2021 72* 73 - 393 U/L Final    WBC 07/22/2021 6.59  4.50 - 11.00 K/uL Final    RBC 07/22/2021 3.89* 4.20 - 5.40 M/uL Final    Hemoglobin  07/22/2021 11.4* 12.0 - 16.0 g/dL Final    Hematocrit 07/22/2021 33.2* 38.0 - 47.0 % Final    MCV 07/22/2021 85.3  80.0 - 96.0 fL Final    MCH 07/22/2021 29.3  27.0 - 31.0 pg Final    MCHC 07/22/2021 34.3  32.0 - 36.0 g/dL Final    RDW 07/22/2021 12.8  11.5 - 14.5 % Final    Platelet Count 07/22/2021 318  150 - 400 K/uL Final    MPV 07/22/2021 9.1* 9.4 - 12.4 fL Final    Neutrophils % 07/22/2021 53.3  53.0 - 65.0 % Final    Lymphocytes % 07/22/2021 39.8  27.0 - 41.0 % Final    Monocytes % 07/22/2021 5.9  2.0 - 6.0 % Final    Eosinophils % 07/22/2021 0.6* 1.0 - 4.0 % Final    Basophils % 07/22/2021 0.2  0.0 - 1.0 % Final    Immature Granulocytes % 07/22/2021 0.2  0.0 - 0.4 % Final    nRBC, Auto 07/22/2021 0.0  <=0.0 % Final    Neutrophils, Abs 07/22/2021 3.52  1.80 - 7.70 K/uL Final    Lymphocytes, Absolute 07/22/2021 2.62  1.00 - 4.80 K/uL Final    Monocytes, Absolute 07/22/2021 0.39  0.00 - 0.80 K/uL Final    Eosinophils, Absolute 07/22/2021 0.04  0.00 - 0.50 K/uL Final    Basophils, Absolute 07/22/2021 0.01  0.00 - 0.20 K/uL Final    Immature Granulocytes, Absolute 07/22/2021 0.01  0.00 - 0.04 K/uL Final    nRBC, Absolute 07/22/2021 0.00  <=0.00 x10e3/uL Final    Diff Type 07/22/2021 Auto   Final    Potassium 07/22/2021 2.8* 3.5 - 5.1 mmol/L Final    Final Diagnosis 07/22/2021    Final                    Value:This result contains rich text formatting which cannot be displayed here.    Gross Description 07/22/2021    Final                    Value:This result contains rich text formatting which cannot be displayed here.    Microscopic Description 07/22/2021    Final                    Value:This result contains rich text formatting which cannot be displayed here.    Laboratory Notes 07/22/2021    Final                    Value:This result contains rich text formatting which cannot be displayed here.    POC Glucose 07/22/2021 101  70 - 105 mg/dL Final    Sodium 07/23/2021 144  136 - 145  mmol/L Final    Potassium 07/23/2021 2.9* 3.5 - 5.1 mmol/L Final    Chloride 07/23/2021 106  98 - 107 mmol/L Final    CO2 07/23/2021 29  21 - 32 mmol/L Final    Anion Gap 07/23/2021 12  7 - 16 mmol/L Final    Glucose 07/23/2021 90  74 - 106 mg/dL Final    BUN 07/23/2021 9  7 - 18 mg/dL Final    Creatinine 07/23/2021 0.66  0.55 - 1.02 mg/dL Final    BUN/Creatinine Ratio 07/23/2021 14  6 - 20 Final    Calcium 07/23/2021 7.8* 8.5 - 10.1 mg/dL Final    Total Protein 07/23/2021 6.3* 6.4 - 8.2 g/dL Final    Albumin 07/23/2021 2.5* 3.5 - 5.0 g/dL Final    Globulin 07/23/2021 3.8  2.0 - 4.0 g/dL Final    A/G Ratio 07/23/2021 0.7   Final    Bilirubin, Total 07/23/2021 0.3  >0.0 - 1.2 mg/dL Final    Alk Phos 07/23/2021 69  46 - 118 U/L Final    ALT 07/23/2021 21  13 - 56 U/L Final    AST 07/23/2021 19  15 - 37 U/L Final    eGFR  07/23/2021 119  >=60 mL/min/1.73m² Final    WBC 07/23/2021 6.12  4.50 - 11.00 K/uL Final    RBC 07/23/2021 3.69* 4.20 - 5.40 M/uL Final    Hemoglobin 07/23/2021 10.4* 12.0 - 16.0 g/dL Final    Hematocrit 07/23/2021 32.5* 38.0 - 47.0 % Final    MCV 07/23/2021 88.1  80.0 - 96.0 fL Final    MCH 07/23/2021 28.2  27.0 - 31.0 pg Final    MCHC 07/23/2021 32.0  32.0 - 36.0 g/dL Final    RDW 07/23/2021 12.9  11.5 - 14.5 % Final    Platelet Count 07/23/2021 298  150 - 400 K/uL Final    MPV 07/23/2021 9.5  9.4 - 12.4 fL Final    Neutrophils % 07/23/2021 66.9* 53.0 - 65.0 % Final    Lymphocytes % 07/23/2021 25.8* 27.0 - 41.0 % Final    Monocytes % 07/23/2021 5.9  2.0 - 6.0 % Final    Eosinophils % 07/23/2021 0.8* 1.0 - 4.0 % Final    Basophils % 07/23/2021 0.3  0.0 - 1.0 % Final    Immature Granulocytes % 07/23/2021 0.3  0.0 - 0.4 % Final    nRBC, Auto 07/23/2021 0.0  <=0.0 % Final    Neutrophils, Abs 07/23/2021 4.09  1.80 - 7.70 K/uL Final    Lymphocytes, Absolute 07/23/2021 1.58  1.00 - 4.80 K/uL Final    Monocytes, Absolute 07/23/2021 0.36  0.00 - 0.80 K/uL  Final    Eosinophils, Absolute 07/23/2021 0.05  0.00 - 0.50 K/uL Final    Basophils, Absolute 07/23/2021 0.02  0.00 - 0.20 K/uL Final    Immature Granulocytes, Absolute 07/23/2021 0.02  0.00 - 0.04 K/uL Final    nRBC, Absolute 07/23/2021 0.00  <=0.00 x10e3/uL Final    Diff Type 07/23/2021 Auto   Final           Assessment:      1. Lumbar radiculopathy, chronic    2. Chronic pain of right knee    3. Osteoarthrosis multiple sites, not specified as generalized    4. Other long term (current) drug therapy          Orders Placed This Encounter   Procedures    Drug Screen Definitive 14, Urine     Standing Status:   Future     Number of Occurrences:   1     Standing Expiration Date:   3/6/2023     Order Specific Question:   Specimen Source     Answer:   Urine    Ambulatory referral/consult to Physical/Occupational Therapy     Standing Status:   Future     Standing Expiration Date:   2/5/2023     Referral Priority:   Routine     Referral Type:   Physical Medicine     Referral Reason:   Specialty Services Required     Requested Specialty:   Physical Therapy     Number of Visits Requested:   1    Case Request Operating Room: Injection, Steroid, Epidural, L4/5     Order Specific Question:   Medical Necessity:     Answer:   Medically Non-Urgent [100]     Order Specific Question:   CPT Code:     Answer:   GA INJ LUMBAR/SACRAL, W/IMAGING GUIDANCE [77329]     Order Specific Question:   Is an on-site pathologist required for this procedure?     Answer:   N/A         A's of Opioid Risk Assessment  Activity:Patient can perform ADL.   Analgesia:Patients pain is partially controlled by current medication. Patient has tried OTC medications such as Tylenol and Ibuprofen with out relief.   Adverse Effects: Patient denies constipation or sedation.  Aberrant Behavior:  reviewed with no aberrant drug seeking/taking behavior.  Overdose reversal drug naloxone discussed    Drug screen reviewed      Requested Prescriptions     Signed  Prescriptions Disp Refills    oxyCODONE-acetaminophen (PERCOCET)  mg per tablet 60 tablet 0     Sig: Take 1 tablet by mouth every 12 (twelve) hours.    gabapentin (NEURONTIN) 400 MG capsule 60 capsule 1     Sig: Take 1 capsule (400 mg total) by mouth every 12 (twelve) hours.    oxyCODONE-acetaminophen (PERCOCET)  mg per tablet 60 tablet 0     Sig: Take 1 tablet by mouth every 12 (twelve) hours.         Plan:    Complaint of back pain buttock and leg pain both legs pain numbness and tingling both legs worse with standing walking laughing or coughing better with short periods resting    History of lumbar epidural steroid injections she states it did help control her discomfort several years ago she is requesting repeat lumbar procedures    definitive drug screen December 20, 2021 Ms. Mendez due to clerical error    Will reorder definitive drug screen today    Presumptive drug screen was negative patient reported taking Percocet    X-ray right knee [facility] December 20, 2021, mild spurring changes   moderate tricompartmental degenerative changes  No fracture noted    X-ray lumbar spine [facility] December 20, 2021, degenerative changes noted no abnormal motion noted    Physical therapy prescription given to patient    Resume Percocet 10 1 p.o. q.12 hours    Continue gabapentin as directed    Indications for this procedure for this specific patient include the following     - Injections being provided as part of a comprehensive pain management program.    - Pt has failed 6 weeks of conservative therapy including oral meds, PT and or home exercise program which has been discussed with the patient   - Injection being provided for suspected radicular pain.    - Pain scale of greater than or equal to 3/10 with functional impairment  - No evidence of local or systemic infection, bleeding tendency or unstable medical condition.    - Pain is causing significant functional limitation resulting in  diminished quality of life and impaired age appropriate ADL's.   - Repeat injections are done no sooner than 7 days after the previous injection  - Epidural done for suspected radicular pain along dermatome of nerve   - Epidural done to differentiate level of radicular nerve root pain   - Repeat injections done only when pt reports 50% improvement in pain from previous injections    - Injection done at L4/5 level(s) which is consistent with patient's dermatomal pain complaint    Schedule lumbar L4/5 MARCOS # 1, lumbar radiculopathy    Dr. Soto,     Bring original prescription medication bottles/container/box with labels to each visit

## 2022-01-05 ENCOUNTER — OFFICE VISIT (OUTPATIENT)
Dept: PAIN MEDICINE | Facility: CLINIC | Age: 57
End: 2022-01-05
Payer: MEDICARE

## 2022-01-05 VITALS
HEART RATE: 66 BPM | WEIGHT: 193 LBS | RESPIRATION RATE: 18 BRPM | DIASTOLIC BLOOD PRESSURE: 92 MMHG | HEIGHT: 66 IN | BODY MASS INDEX: 31.02 KG/M2 | SYSTOLIC BLOOD PRESSURE: 186 MMHG

## 2022-01-05 DIAGNOSIS — M54.16 LUMBAR RADICULOPATHY, CHRONIC: Primary | Chronic | ICD-10-CM

## 2022-01-05 DIAGNOSIS — G89.29 CHRONIC PAIN OF RIGHT KNEE: Chronic | ICD-10-CM

## 2022-01-05 DIAGNOSIS — Z79.899 OTHER LONG TERM (CURRENT) DRUG THERAPY: ICD-10-CM

## 2022-01-05 DIAGNOSIS — M89.49 OSTEOARTHROSIS MULTIPLE SITES, NOT SPECIFIED AS GENERALIZED: Chronic | ICD-10-CM

## 2022-01-05 DIAGNOSIS — M25.561 CHRONIC PAIN OF RIGHT KNEE: Chronic | ICD-10-CM

## 2022-01-05 PROCEDURE — 3077F SYST BP >= 140 MM HG: CPT | Mod: CPTII,,, | Performed by: PHYSICIAN ASSISTANT

## 2022-01-05 PROCEDURE — 99214 PR OFFICE/OUTPT VISIT, EST, LEVL IV, 30-39 MIN: ICD-10-PCS | Mod: S$PBB,25,, | Performed by: PHYSICIAN ASSISTANT

## 2022-01-05 PROCEDURE — 3080F PR MOST RECENT DIASTOLIC BLOOD PRESSURE >= 90 MM HG: ICD-10-PCS | Mod: CPTII,,, | Performed by: PHYSICIAN ASSISTANT

## 2022-01-05 PROCEDURE — 99215 OFFICE O/P EST HI 40 MIN: CPT | Mod: PBBFAC | Performed by: PHYSICIAN ASSISTANT

## 2022-01-05 PROCEDURE — G0481 PR DRUG TEST DEF 8-14 CLASSES: ICD-10-PCS | Mod: ,,, | Performed by: CLINICAL MEDICAL LABORATORY

## 2022-01-05 PROCEDURE — 3080F DIAST BP >= 90 MM HG: CPT | Mod: CPTII,,, | Performed by: PHYSICIAN ASSISTANT

## 2022-01-05 PROCEDURE — 3008F PR BODY MASS INDEX (BMI) DOCUMENTED: ICD-10-PCS | Mod: CPTII,,, | Performed by: PHYSICIAN ASSISTANT

## 2022-01-05 PROCEDURE — 99214 OFFICE O/P EST MOD 30 MIN: CPT | Mod: S$PBB,25,, | Performed by: PHYSICIAN ASSISTANT

## 2022-01-05 PROCEDURE — 96372 THER/PROPH/DIAG INJ SC/IM: CPT | Mod: PBBFAC | Performed by: PHYSICIAN ASSISTANT

## 2022-01-05 PROCEDURE — G0481 DRUG TEST DEF 8-14 CLASSES: HCPCS | Mod: ,,, | Performed by: CLINICAL MEDICAL LABORATORY

## 2022-01-05 PROCEDURE — 1159F PR MEDICATION LIST DOCUMENTED IN MEDICAL RECORD: ICD-10-PCS | Mod: CPTII,,, | Performed by: PHYSICIAN ASSISTANT

## 2022-01-05 PROCEDURE — 3008F BODY MASS INDEX DOCD: CPT | Mod: CPTII,,, | Performed by: PHYSICIAN ASSISTANT

## 2022-01-05 PROCEDURE — 3077F PR MOST RECENT SYSTOLIC BLOOD PRESSURE >= 140 MM HG: ICD-10-PCS | Mod: CPTII,,, | Performed by: PHYSICIAN ASSISTANT

## 2022-01-05 PROCEDURE — 1159F MED LIST DOCD IN RCRD: CPT | Mod: CPTII,,, | Performed by: PHYSICIAN ASSISTANT

## 2022-01-05 RX ORDER — OXYCODONE AND ACETAMINOPHEN 10; 325 MG/1; MG/1
1 TABLET ORAL EVERY 12 HOURS
Qty: 60 TABLET | Refills: 0 | Status: SHIPPED | OUTPATIENT
Start: 2022-01-05 | End: 2022-02-04

## 2022-01-05 RX ORDER — GABAPENTIN 400 MG/1
400 CAPSULE ORAL EVERY 12 HOURS
Qty: 60 CAPSULE | Refills: 1 | Status: SHIPPED | OUTPATIENT
Start: 2022-01-05 | End: 2022-02-28 | Stop reason: SDUPTHER

## 2022-01-05 RX ORDER — OXYCODONE AND ACETAMINOPHEN 10; 325 MG/1; MG/1
1 TABLET ORAL EVERY 12 HOURS
Qty: 60 TABLET | Refills: 0 | Status: SHIPPED | OUTPATIENT
Start: 2022-02-04 | End: 2022-02-28 | Stop reason: SDUPTHER

## 2022-01-05 RX ORDER — KETOROLAC TROMETHAMINE 30 MG/ML
60 INJECTION, SOLUTION INTRAMUSCULAR; INTRAVENOUS
Status: COMPLETED | OUTPATIENT
Start: 2022-01-05 | End: 2022-01-05

## 2022-01-05 RX ADMIN — KETOROLAC TROMETHAMINE 60 MG: 30 INJECTION, SOLUTION INTRAMUSCULAR; INTRAVENOUS at 01:01

## 2022-01-05 NOTE — PATIENT INSTRUCTIONS
COVID SCREENING TO BE DONE 48-72 HOURS PRIOR TO PROCEDURE IF PT HAS NOT RECEIVED BOTH COVID VACCINATIONS OR HAS BEEN VACCINATED WITHIN THE LAST 2 WEEKS. VACCINATION CARD MUST BE PROVIDED IF PT HAS BEEN VACCINATED OR PT MUST HAVE COVID SCREENING IN ORDER TO HAVE PROCEDURE. IF YOUR PRIMARY CARE DOCTOR ORDERS YOUR COVID TESTING YOU MUST BRING YOUR RESULTS WITH YOU TO YOUR PROCEDURE.    Procedure Instructions:    Nothing to eat or drink for 8 hours or after midnight including gum, candy, mints, or tobacco products.  If you are scheduled for 1:30 or later nothing to eat or drink after 5 a.m. the morning of the procedure, including gum, candy, mints, or tobacco products.  Must have a  at least 18 yrs of age to stay present at all times  No Diabetic medications the morning of procedure, check blood sugar the morning of procedure, if it is greater than 200 call the office at [phone]  If you are started on antibiotics or have been prescribed antibiotics, have a fever, or have any other type of infection call to reschedule procedure.  If you take blood pressure medications you can take it at your regular scheduled time.    If you are having a cervical/NECK procedure done: hold aspirin and aspirin products for 7 days and NSAIDS( ibuprofen, mobic, advil, aleve etc....)  for 3 days   prior to procedure.

## 2022-01-11 ENCOUNTER — TELEPHONE (OUTPATIENT)
Dept: PAIN MEDICINE | Facility: CLINIC | Age: 57
End: 2022-01-11
Payer: MEDICARE

## 2022-01-11 DIAGNOSIS — Z11.59 SCREENING FOR VIRAL DISEASE: Primary | ICD-10-CM

## 2022-01-11 LAB
6-ACETYLMORPHINE, URINE (RUSH): NEGATIVE 10 NG/ML
7-AMINOCLONAZEPAM, URINE (RUSH): NEGATIVE 25 NG/ML
A-HYDROXYALPRAZOLAM, URINE (RUSH): NEGATIVE 25 NG/ML
ACETYL FENTANYL, URINE (RUSH): NEGATIVE 2.5 NG/ML
ACETYL NORFENTANYL OXALATE, URINE (RUSH): NEGATIVE 5 NG/ML
AMPHET UR QL SCN: NEGATIVE 100 NG/ML
BENZOYLECGONINE, URINE (RUSH): NEGATIVE 100 NG/ML
BUPRENORPHINE UR QL SCN: NEGATIVE 25 NG/ML
CODEINE, URINE (RUSH): NEGATIVE 25 NG/ML
CREAT UR-MCNC: 65 MG/DL (ref 28–219)
EDDP, URINE (RUSH): NEGATIVE 25 NG/ML
FENTANYL, URINE (RUSH): NEGATIVE 2.5 NG/ML
HYDROCODONE, URINE (RUSH): NEGATIVE 25 NG/ML
HYDROMORPHONE, URINE (RUSH): NEGATIVE 25 NG/ML
LORAZEPAM, URINE (RUSH): NEGATIVE 25 NG/ML
METHADONE UR QL SCN: NEGATIVE 25 NG/ML
METHAMPHET UR QL SCN: NEGATIVE 100 NG/ML
MORPHINE, URINE (RUSH): NEGATIVE 25 NG/ML
NORBUPRENORPHINE, URINE (RUSH): NEGATIVE 25 NG/ML
NORDIAZEPAM, URINE (RUSH): NEGATIVE 25 NG/ML
NORFENTANYL OXALATE, URINE (RUSH): NEGATIVE 5 NG/ML
NORHYDROCODONE, URINE (RUSH): NEGATIVE 50 NG/ML
NOROXYCODONE HCL, URINE (RUSH): NEGATIVE 50 NG/ML
OXAZEPAM, URINE (RUSH): NEGATIVE 25 NG/ML
OXYCODONE UR QL SCN: NEGATIVE 25 NG/ML
OXYMORPHONE, URINE (RUSH): NEGATIVE 25 NG/ML
PH UR STRIP: 5.5 PH UNITS
SP GR UR STRIP: 1.02
TAPENTADOL, URINE (RUSH): NEGATIVE 25 NG/ML
TEMAZEPAM, URINE (RUSH): NEGATIVE 25 NG/ML
THC-COOH, URINE (RUSH): NEGATIVE 25 NG/ML
TRAMADOL, URINE (RUSH): NEGATIVE 100 NG/ML

## 2022-01-11 NOTE — TELEPHONE ENCOUNTER
Attempted to contact to inform of need to be covid tested on 1-14 for procedure on 1-18, message left on voicemail.

## 2022-01-18 ENCOUNTER — ANESTHESIA (OUTPATIENT)
Dept: PAIN MEDICINE | Facility: HOSPITAL | Age: 57
End: 2022-01-18
Payer: MEDICARE

## 2022-01-18 ENCOUNTER — HOSPITAL ENCOUNTER (OUTPATIENT)
Facility: HOSPITAL | Age: 57
Discharge: HOME | End: 2022-01-18
Attending: PAIN MEDICINE | Admitting: PAIN MEDICINE
Payer: MEDICARE

## 2022-01-18 ENCOUNTER — ANESTHESIA EVENT (OUTPATIENT)
Dept: PAIN MEDICINE | Facility: HOSPITAL | Age: 57
End: 2022-01-18
Payer: MEDICARE

## 2022-01-18 VITALS
HEIGHT: 66 IN | OXYGEN SATURATION: 97 % | BODY MASS INDEX: 30.22 KG/M2 | DIASTOLIC BLOOD PRESSURE: 91 MMHG | HEART RATE: 80 BPM | RESPIRATION RATE: 12 BRPM | SYSTOLIC BLOOD PRESSURE: 169 MMHG | TEMPERATURE: 97 F | WEIGHT: 188 LBS

## 2022-01-18 DIAGNOSIS — M54.16 LUMBAR RADICULOPATHY, CHRONIC: Primary | Chronic | ICD-10-CM

## 2022-01-18 DIAGNOSIS — M54.16 RADICULOPATHY OF LUMBAR REGION: ICD-10-CM

## 2022-01-18 LAB
GLUCOSE SERPL-MCNC: 100 MG/DL (ref 70–105)
GLUCOSE SERPL-MCNC: 100 MG/DL (ref 70–110)

## 2022-01-18 PROCEDURE — 37000009 HC ANESTHESIA EA ADD 15 MINS: Performed by: PAIN MEDICINE

## 2022-01-18 PROCEDURE — D9220A PRA ANESTHESIA: ICD-10-PCS | Mod: ,,, | Performed by: NURSE ANESTHETIST, CERTIFIED REGISTERED

## 2022-01-18 PROCEDURE — 62323 NJX INTERLAMINAR LMBR/SAC: CPT | Mod: ,,, | Performed by: PAIN MEDICINE

## 2022-01-18 PROCEDURE — D9220A PRA ANESTHESIA: Mod: ,,, | Performed by: NURSE ANESTHETIST, CERTIFIED REGISTERED

## 2022-01-18 PROCEDURE — 62323 PR INJ LUMBAR/SACRAL, W/IMAGING GUIDANCE: ICD-10-PCS | Mod: ,,, | Performed by: PAIN MEDICINE

## 2022-01-18 PROCEDURE — 27000284 HC CANNULA NASAL: Performed by: NURSE ANESTHETIST, CERTIFIED REGISTERED

## 2022-01-18 PROCEDURE — 82962 GLUCOSE BLOOD TEST: CPT

## 2022-01-18 PROCEDURE — 25500020 PHARM REV CODE 255: Performed by: PAIN MEDICINE

## 2022-01-18 PROCEDURE — 62323 NJX INTERLAMINAR LMBR/SAC: CPT | Performed by: PAIN MEDICINE

## 2022-01-18 PROCEDURE — 27201423 OPTIME MED/SURG SUP & DEVICES STERILE SUPPLY: Performed by: PAIN MEDICINE

## 2022-01-18 PROCEDURE — 37000008 HC ANESTHESIA 1ST 15 MINUTES: Performed by: PAIN MEDICINE

## 2022-01-18 PROCEDURE — 25000003 PHARM REV CODE 250: Performed by: NURSE ANESTHETIST, CERTIFIED REGISTERED

## 2022-01-18 PROCEDURE — 63600175 PHARM REV CODE 636 W HCPCS: Performed by: PAIN MEDICINE

## 2022-01-18 PROCEDURE — 63600175 PHARM REV CODE 636 W HCPCS: Performed by: NURSE ANESTHETIST, CERTIFIED REGISTERED

## 2022-01-18 PROCEDURE — 25000003 PHARM REV CODE 250: Performed by: PAIN MEDICINE

## 2022-01-18 RX ORDER — IOPAMIDOL 612 MG/ML
INJECTION, SOLUTION INTRATHECAL
Status: DISCONTINUED | OUTPATIENT
Start: 2022-01-18 | End: 2022-01-18 | Stop reason: HOSPADM

## 2022-01-18 RX ORDER — SODIUM CHLORIDE 9 MG/ML
INJECTION, SOLUTION INTRAVENOUS CONTINUOUS
Status: DISCONTINUED | OUTPATIENT
Start: 2022-01-18 | End: 2022-01-18 | Stop reason: HOSPADM

## 2022-01-18 RX ORDER — TRIAMCINOLONE ACETONIDE 40 MG/ML
INJECTION, SUSPENSION INTRA-ARTICULAR; INTRAMUSCULAR
Status: DISCONTINUED | OUTPATIENT
Start: 2022-01-18 | End: 2022-01-18 | Stop reason: HOSPADM

## 2022-01-18 RX ORDER — PROPOFOL 10 MG/ML
INJECTION, EMULSION INTRAVENOUS
Status: DISCONTINUED | OUTPATIENT
Start: 2022-01-18 | End: 2022-01-18

## 2022-01-18 RX ORDER — HYDRALAZINE HYDROCHLORIDE 20 MG/ML
INJECTION INTRAMUSCULAR; INTRAVENOUS
Status: DISCONTINUED | OUTPATIENT
Start: 2022-01-18 | End: 2022-01-18

## 2022-01-18 RX ORDER — LIDOCAINE HYDROCHLORIDE 20 MG/ML
INJECTION, SOLUTION EPIDURAL; INFILTRATION; INTRACAUDAL; PERINEURAL
Status: DISCONTINUED | OUTPATIENT
Start: 2022-01-18 | End: 2022-01-18

## 2022-01-18 RX ADMIN — PROPOFOL 50 MG: 10 INJECTION, EMULSION INTRAVENOUS at 12:01

## 2022-01-18 RX ADMIN — PROPOFOL 100 MG: 10 INJECTION, EMULSION INTRAVENOUS at 12:01

## 2022-01-18 RX ADMIN — HYDRALAZINE HYDROCHLORIDE 10 MG: 20 INJECTION INTRAMUSCULAR; INTRAVENOUS at 12:01

## 2022-01-18 RX ADMIN — LIDOCAINE HYDROCHLORIDE 100 MG: 20 INJECTION, SOLUTION INTRAVENOUS at 12:01

## 2022-01-18 RX ADMIN — SODIUM CHLORIDE: 9 INJECTION, SOLUTION INTRAVENOUS at 11:01

## 2022-01-18 NOTE — PLAN OF CARE
INFORMED PT IF UNABLE TO VOID IN 8 HOURS PROCEED TO NEAREST ER. NOTIFY MD OF REDNESS, DRAINAGE FROM INJECTION SITE OR FEVER OVER THE NEXT 3-4 DAYS. REST DRINK PLENTY OF FLUIDS FOR THE REMAINDER OF THE DAY. NO LIFTING OVER 5 LBS FOR 24 HRS. CONTINUE REGULAR MEDICATIONS AS PRESCRIBED. MAY TAKE PAIN MEDICATIONS AS PRESCRIBED.

## 2022-01-18 NOTE — ANESTHESIA PREPROCEDURE EVALUATION
01/18/2022  Audrey Causey is a 56 y.o., female.    Anesthesia Evaluation    I have reviewed the Patient Summary Reports.    I have reviewed the Nursing Notes. I have reviewed the NPO Status.   I have reviewed the Medications.     Review of Systems  Anesthesia Hx:  No problems with previous Anesthesia    Cardiovascular:   Hypertension    Pulmonary:   Sleep Apnea    Hepatic/GI:   GERD    Musculoskeletal:   Arthritis   Lumbar Spine Disorders   Neurological:   Neuromuscular Disease, Seizures  Pain Syndrome  Chronic Pain Syndrome   Endocrine:   Diabetes      Past Medical History:   Diagnosis Date    Acute superficial gastritis without hemorrhage 6/30/2021    Diabetes mellitus     Diverticula, colon 9/20/2021    Esophageal dysphagia 6/30/2021    GERD (gastroesophageal reflux disease)     Hypertension     Low back pain     Lumbar radiculopathy     Seizures     Sleep apnea        Physical Exam  General:  Well nourished, Obesity    Airway/Jaw/Neck:  Airway Findings: General Airway Assessment: Adult Mallampati: II       Chest/Lungs:  Chest/Lungs Findings: Clear to auscultation     Heart/Vascular:  Heart Findings: Rate: Normal        Mental Status:  Mental Status Findings:  Cooperative, Alert and Oriented         Anesthesia Plan  Type of Anesthesia, risks & benefits discussed:  Anesthesia Type:  general    Patient's Preference:   Plan Factors:          Intra-op Monitoring Plan: standard ASA monitors  Intra-op Monitoring Plan Comments:   Post Op Pain Control Plan: multimodal analgesia  Post Op Pain Control Plan Comments:     Induction:   IV  Beta Blocker:  Patient is not currently on a Beta-Blocker (No further documentation required).       Informed Consent: Patient understands risks and agrees with Anesthesia plan.  Questions answered. Anesthesia consent signed with patient.  ASA Score: 3     Day of Surgery  Review of History & Physical: I have interviewed and examined the patient. I have reviewed the patient's H&P dated:  There are no significant changes.          Ready For Surgery From Anesthesia Perspective.

## 2022-01-18 NOTE — TRANSFER OF CARE
"Anesthesia Transfer of Care Note    Patient: Audrey Causey    Procedure(s) Performed: Procedure(s) (LRB):  Injection, Steroid, Epidural, L4/5 (N/A)    Patient location: PACU    Anesthesia Type: general    Transport from OR: Transported from OR on room air with adequate spontaneous ventilation    Post pain: adequate analgesia    Post assessment: no apparent anesthetic complications    Post vital signs: stable    Level of consciousness: responds to stimulation    Nausea/Vomiting: no nausea/vomiting    Complications: none    Transfer of care protocol was followed      Last vitals:   Visit Vitals  BP (!) 145/70 (BP Location: Left arm, Patient Position: Lying)   Pulse 92   Temp 36.1 °C (97 °F) (Oral)   Resp 16   Ht 5' 6" (1.676 m)   Wt 85.3 kg (188 lb)   SpO2 97%   Breastfeeding No   BMI 30.34 kg/m²     "

## 2022-01-18 NOTE — BRIEF OP NOTE
Discharge Note  Short Stay    Admit Date: 1/18/2022    Discharge Date: 1/18/2022    Attending Physician: Yari Soto     Discharge Provider: Yari Soto    Diagnoses:Lumbar Radiculopathy    Discharged Condition: Good    Final Diagnoses: Lumbar radiculopathy, chronic [M54.16]    Disposition: Home or Self Care    Hospital Course: No complications, uneventful    Outcome of Hospitalization, Treatment, Procedure, or Surgery:  Patient was admitted for outpatient interventional pain management procedure. The patient tolerated the procedure well with no complications.    Follow up/Patient Instructions:  Follow up as scheduled in Pain Management office in 3-4 weeks.  Patient has received instructions and follow up date and time.    Medications:  Continue previous medications

## 2022-01-18 NOTE — ANESTHESIA POSTPROCEDURE EVALUATION
Anesthesia Post Evaluation    Patient: Audrey Causey    Procedure(s) Performed: Procedure(s) (LRB):  Injection, Steroid, Epidural, L4/5 (N/A)    Final Anesthesia Type: general      Patient location during evaluation: PACU  Patient participation: Yes- Able to Participate  Level of consciousness: awake and alert  Post-procedure vital signs: reviewed and stable  Pain management: adequate  Airway patency: patent    PONV status at discharge: No PONV  Anesthetic complications: no      Cardiovascular status: blood pressure returned to baseline and hemodynamically stable  Respiratory status: spontaneous ventilation, unassisted and nasal cannula  Hydration status: euvolemic  Follow-up not needed.          Vitals Value Taken Time   /70 01/18/22 1212   Temp 97f 01/18/22 1212   Pulse 78 01/18/22 1212   Resp 16 01/18/22 1212   SpO2 99 01/18/22 1212         No case tracking events are documented in the log.      Pain/Mónica Score: No data recorded

## 2022-01-18 NOTE — OP NOTE
"Procedure Note    Procedure Date: 1/18/2022    Procedure Performed:  Lumbar interlaminar epidural steroid injection under fluoroscopy at L4-5    Indications: Patient failed conservative therapy.      Pre-op diagnosis: Lumbar Radiculopathy    Post-op diagnosis: same    Physician: Yari Soto MD    Anesthesia: MAC    Medications injected: Kenalog 40mg,  2 mL sterile preservative-free normal saline.    Local anesthetic used: 1% Lidocaine, 3 ml    Estimated Blood Loss: None    Complications:  None    Technique:  The patient was interviewed in the holding area and Risks/Benefits were discussed, including, but not limited to, the possibility of new or different pain, bleeding or infection.   All questions were answered.  The patient understood and accepted risks.  Consent was verified and signed.   A time-out was taken to identify patient and procedure prior to starting the procedure. The patient was placed in the prone position on the fluoroscopy table. The area of the lumbar spine was prepped with Chloraprep and draped in a sterile manner. The L4-5  interspace was identified and marked under AP fluoroscopy. The skin and subcutaneous tissues overlying the targeted interspace were anesthetized with 3-5 mL of 1% lidocaine using a 25G 1.5" needle.  A 20G  3.5" Tuohy epidural needle was directed toward the interspace under fluoroscopic guidance until the ligamentum flavum was engaged. From this point, a loss of resistance technique with a pulsator syringe a was used to identify entrance of the needle into the epidural space. Once loss of resistance was observed 3mL of Isovue contrast solution was injected. An appropriate epidurogram was noted.  A 3mL mixture consisting of saline and 40 mg of kenalog was injected slowly and without resistance.  The needle was  removed and a sterile Band-Aid dressing was applied to the puncture site.  The patient tolerated the procedure well and was transferred to the .AC. in stable " condition.  The patient was monitored after the procedure and was given post-procedure and discharge instructions to follow at home. The patient was discharged in a stable condition and accompanied by an adult .    Epidurogram:5 mL allotment of Isovue M 300 contrast revealed excellent delineation from L3-5. There were no  filling defects or obstruction to dye flow noted.  There was no  intravascular or intrathecal spread noted with dye flow.

## 2022-01-18 NOTE — DISCHARGE SUMMARY
[facility] - Pain Management  Discharge Note  Short Stay    Procedure(s) (LRB):  Injection, Steroid, Epidural, L4/5 (N/A)    OUTCOME: Patient tolerated treatment/procedure well without complication and is now ready for discharge.    DISPOSITION: Home or Self Care    FINAL DIAGNOSIS:  Lumbar radiculopathy    FOLLOWUP: In clinic    DISCHARGE INSTRUCTIONS:  See Nurse's notes     TIME SPENT ON DISCHARGE: 5 minutes

## 2022-01-26 RX ORDER — OXYCODONE AND ACETAMINOPHEN 10; 325 MG/1; MG/1
1 TABLET ORAL EVERY 12 HOURS
Qty: 60 TABLET | Refills: 0 | Status: CANCELLED | OUTPATIENT
Start: 2022-01-26 | End: 2022-02-25

## 2022-01-26 RX ORDER — GABAPENTIN 400 MG/1
400 CAPSULE ORAL EVERY 12 HOURS
Qty: 60 CAPSULE | Refills: 1 | Status: CANCELLED | OUTPATIENT
Start: 2022-01-26

## 2022-01-26 RX ORDER — OXYCODONE AND ACETAMINOPHEN 10; 325 MG/1; MG/1
1 TABLET ORAL EVERY 12 HOURS
Qty: 60 TABLET | Refills: 0 | Status: CANCELLED | OUTPATIENT
Start: 2022-02-04 | End: 2022-03-06

## 2022-01-26 NOTE — PROGRESS NOTES
Disclaimer:  This note has been generated using voice recognition software.  There may be type of graft focal areas that have been missed during a proof reading      Subjective:      Patient ID: Audrey Causey is a 56 y.o. female.    Chief Complaint: Low-back Pain and Leg Pain (bilateral)      Pain  This is a chronic problem. The current episode started more than 1 year ago. The problem occurs daily. The problem has been unchanged. Associated symptoms include arthralgias and neck pain. Pertinent negatives include no change in bowel habit, chest pain, chills, coughing, diaphoresis, fever, rash, sore throat, vertigo or vomiting.     Review of Systems   Constitutional: Negative for appetite change, chills, diaphoresis, fever and unexpected weight change.   HENT: Negative for drooling, ear discharge, ear pain, facial swelling, nosebleeds, sore throat, trouble swallowing, voice change and goiter.    Eyes: Negative for photophobia, pain, discharge, redness and visual disturbance.   Respiratory: Negative for apnea, cough, choking, chest tightness, shortness of breath, wheezing and stridor.    Cardiovascular: Negative for chest pain, palpitations and leg swelling.   Gastrointestinal: Negative for abdominal distention, change in bowel habit, diarrhea, rectal pain, vomiting, fecal incontinence and change in bowel habit.   Endocrine: Negative for cold intolerance, heat intolerance, polydipsia, polyphagia and polyuria.   Genitourinary: Negative for bladder incontinence, dysuria, flank pain, frequency and hot flashes.   Musculoskeletal: Positive for arthralgias, back pain, leg pain and neck pain.   Integumentary:  Negative for color change, pallor and rash.   Allergic/Immunologic: Negative for immunocompromised state.   Neurological: Negative for dizziness, vertigo, seizures, syncope, facial asymmetry, speech difficulty, light-headedness, disturbances in coordination, memory loss and coordination difficulties.   Hematological:  "Negative for adenopathy. Does not bruise/bleed easily.   Psychiatric/Behavioral: Negative for agitation, behavioral problems, confusion, decreased concentration, dysphoric mood, hallucinations, self-injury and suicidal ideas. The patient is not nervous/anxious and is not hyperactive.             Objective:  Vitals:    01/31/22 0808 01/31/22 0809   BP: (!) 181/100    Pulse: 78    Resp: 18    Weight: 87.5 kg (193 lb)    Height: 5' 7" (1.702 m)    PainSc:   7   7         Physical Exam  Vitals and nursing note reviewed. Exam conducted with a chaperone present.   Constitutional:       General: She is awake.      Appearance: Normal appearance. She is not ill-appearing, toxic-appearing or diaphoretic.   HENT:      Head: Normocephalic and atraumatic.      Nose: Nose normal.      Mouth/Throat:      Mouth: Mucous membranes are moist.      Pharynx: Oropharynx is clear.   Eyes:      Conjunctiva/sclera: Conjunctivae normal.      Pupils: Pupils are equal, round, and reactive to light.   Cardiovascular:      Rate and Rhythm: Normal rate.   Pulmonary:      Effort: Pulmonary effort is normal. No respiratory distress.   Abdominal:      Palpations: Abdomen is soft.      Tenderness: There is no guarding.   Musculoskeletal:         General: No signs of injury. Normal range of motion.      Cervical back: Normal range of motion and neck supple. No rigidity.   Skin:     General: Skin is warm and dry.      Coloration: Skin is not jaundiced or pale.   Neurological:      General: No focal deficit present.      Mental Status: She is alert and oriented to person, place, and time. Mental status is at baseline.      Cranial Nerves: Cranial nerves are intact. No cranial nerve deficit (II-XII).   Psychiatric:         Mood and Affect: Mood normal.         Behavior: Behavior normal. Behavior is cooperative.         Thought Content: Thought content normal.           [facility]  See OP Notes for results.     IMPRESSION: See OP Notes " for results.     This procedure was auto-finalized by: Virtual Radiologist       Admission on 01/18/2022, Discharged on 01/18/2022   Component Date Value Ref Range Status    POC Glucose 01/18/2022 100  70 - 110 MG/DL Final    POC Glucose 01/18/2022 100  70 - 105 mg/dL Final   Lab Visit on 01/14/2022   Component Date Value Ref Range Status    COVID-19 Ag 01/14/2022 Negative  Negative, Invalid Final   Office Visit on 01/05/2022   Component Date Value Ref Range Status    pH, UA 01/05/2022 5.5  5.0, 5.5, 6.0, 6.5, 7.0, 7.5, 8.0 pH Units Final    Specific Gravity, UA 01/05/2022 1.020  <=1.005, 1.010, 1.015, 1.020, 1.025, 1.030 Final    Creatinine, Urine 01/05/2022 65  28 - 219 mg/dL Final    6-Acetylmorphine 01/05/2022 Negative  10 ng/mL Final    7-Aminoclonazepam 01/05/2022 Negative  Negative 25 ng/mL Final    a-Hydroxyalprazolam 01/05/2022 Negative  25 ng/mL Final    Acetyl Fentanyl 01/05/2022 Negative  2.5 ng/mL Final    Acetyl Norfentanyl Oxalate 01/05/2022 Negative  5 ng/mL Final    Benzoylecgonine 01/05/2022 Negative  100 ng/mL Final    Buprenorphine 01/05/2022 Negative  25 ng/mL Final    Codeine 01/05/2022 Negative  25 ng/mL Final    EDDP 01/05/2022 Negative  25 ng/mL Final    Fentanyl 01/05/2022 Negative  2.5 ng/mL Final    Hydrocodone 01/05/2022 Negative  25 ng/mL Final    Hydromorphone 01/05/2022 Negative  25 ng/mL Final    Lorazepam 01/05/2022 Negative  25 ng/mL Final    Morphine 01/05/2022 Negative  25 ng/mL Final    Norbuprenorphine 01/05/2022 Negative  25 ng/mL Final    Nordiazepam 01/05/2022 Negative  25 ng/mL Final    Norfentanyl Oxalate 01/05/2022 Negative  5 ng/mL Final    Norhydrocodone 01/05/2022 Negative  50 ng/mL Final    Noroxycodone HCL 01/05/2022 Negative  50 ng/mL Final    Oxazepam 01/05/2022 Negative  25 ng/mL Final    Oxymorphone 01/05/2022 Negative  25 ng/mL Final    Tapentadol 01/05/2022 Negative  25 ng/mL Final    Temazepam 01/05/2022 Negative  25 ng/mL Final     THC-COOH 01/05/2022 Negative  25 ng/mL Final    Tramadol 01/05/2022 Negative  100 ng/mL Final    Amphetamine, Urine 01/05/2022 Negative  Negative 100 ng/mL Final    Methamphetamines, Urine 01/05/2022 Negative  Negative 100 ng/mL Final    Methadone, Urine 01/05/2022 Negative  Negative 25 ng/mL Final    Oxycodone, Urine 01/05/2022 Negative  Negative 25 ng/mL Final   Office Visit on 12/20/2021   Component Date Value Ref Range Status    POC Amphetamines 12/20/2021 Negative  Negative, Inconclusive Final    POC Barbiturates 12/20/2021 Negative  Negative, Inconclusive Final    POC Benzodiazepines 12/20/2021 Negative  Negative, Inconclusive Final    POC Cocaine 12/20/2021 Negative  Negative, Inconclusive Final    POC THC 12/20/2021 Negative  Negative, Inconclusive Final    POC Methadone 12/20/2021 Negative  Negative, Inconclusive Final    POC Methamphetamine 12/20/2021 Negative  Negative, Inconclusive Final    POC Opiates 12/20/2021 Negative  Negative, Inconclusive Final    POC Oxycodone 12/20/2021 Negative  Negative, Inconclusive Final    POC Phencyclidine 12/20/2021 Negative  Negative, Inconclusive Final    POC Methylenedioxymethamphetamine * 12/20/2021 Negative  Negative, Inconclusive Final    POC Tricyclic Antidepressants 12/20/2021 Negative  Negative, Inconclusive Final    POC Buprenorphine 12/20/2021 Negative   Final     Acceptable 12/20/2021 Yes   Final    POC Temperature (Urine) 12/20/2021 94   Final   Hospital Outpatient Visit on 09/20/2021   Component Date Value Ref Range Status    POC Glucose 09/20/2021 93  70 - 110 MG/DL Final   Lab Visit on 09/16/2021   Component Date Value Ref Range Status    POC Rapid COVID 09/16/2021 Negative  Negative Final     Acceptable 09/16/2021 Yes   Final           Assessment:      1. Lumbar radiculopathy, chronic    2. Chronic pain of right knee    3. Osteoarthrosis multiple sites, not specified as generalized          Orders  Placed This Encounter   Procedures    Ambulatory referral/consult to Physical/Occupational Therapy     Standing Status:   Future     Standing Expiration Date:   3/16/2023     Referral Priority:   Routine     Referral Type:   Physical Medicine     Referral Reason:   Specialty Services Required     Requested Specialty:   Physical Therapy     Number of Visits Requested:   1         A's of Opioid Risk Assessment  Activity:Patient can perform ADL.   Analgesia:Patients pain is partially controlled by current medication. Patient has tried OTC medications such as Tylenol and Ibuprofen with out relief.   Adverse Effects: Patient denies constipation or sedation.  Aberrant Behavior:  reviewed with no aberrant drug seeking/taking behavior.  Overdose reversal drug naloxone discussed    Drug screen reviewed      Requested Prescriptions      No prescriptions requested or ordered in this encounter         Plan:    Follow-up after lumbar L4/5 MARCOS # 1 January 18, 2022  She states she had 80% relief after procedure 70% relief ongoing    She would like to continue with conservative management    She states she will start physical therapy    Again today reviewed    X-ray right knee [facility] December 20, 2021, mild spurring changes   moderate tricompartmental degenerative changes  No fracture noted    X-ray lumbar spine [facility] December 20, 2021, degenerative changes noted no abnormal motion noted    Continue gabapentin as directed    Continue current medication    Continue home exercise program as did    Follow-up 6 weeks    Dr. Soto, January 2023    Bring original prescription medication bottles/container/box with labels to each visit

## 2022-01-31 ENCOUNTER — OFFICE VISIT (OUTPATIENT)
Dept: PAIN MEDICINE | Facility: CLINIC | Age: 57
End: 2022-01-31
Payer: MEDICARE

## 2022-01-31 VITALS
SYSTOLIC BLOOD PRESSURE: 181 MMHG | DIASTOLIC BLOOD PRESSURE: 100 MMHG | BODY MASS INDEX: 30.29 KG/M2 | HEART RATE: 78 BPM | RESPIRATION RATE: 18 BRPM | WEIGHT: 193 LBS | HEIGHT: 67 IN

## 2022-01-31 DIAGNOSIS — M54.16 LUMBAR RADICULOPATHY, CHRONIC: Primary | Chronic | ICD-10-CM

## 2022-01-31 DIAGNOSIS — M89.49 OSTEOARTHROSIS MULTIPLE SITES, NOT SPECIFIED AS GENERALIZED: Chronic | ICD-10-CM

## 2022-01-31 DIAGNOSIS — M25.561 CHRONIC PAIN OF RIGHT KNEE: Chronic | ICD-10-CM

## 2022-01-31 DIAGNOSIS — G89.29 CHRONIC PAIN OF RIGHT KNEE: Chronic | ICD-10-CM

## 2022-01-31 PROCEDURE — 1159F PR MEDICATION LIST DOCUMENTED IN MEDICAL RECORD: ICD-10-PCS | Mod: CPTII,,, | Performed by: PHYSICIAN ASSISTANT

## 2022-01-31 PROCEDURE — 99215 OFFICE O/P EST HI 40 MIN: CPT | Mod: PBBFAC | Performed by: PHYSICIAN ASSISTANT

## 2022-01-31 PROCEDURE — 99214 OFFICE O/P EST MOD 30 MIN: CPT | Mod: S$PBB,,, | Performed by: PHYSICIAN ASSISTANT

## 2022-01-31 PROCEDURE — 3008F BODY MASS INDEX DOCD: CPT | Mod: CPTII,,, | Performed by: PHYSICIAN ASSISTANT

## 2022-01-31 PROCEDURE — 1159F MED LIST DOCD IN RCRD: CPT | Mod: CPTII,,, | Performed by: PHYSICIAN ASSISTANT

## 2022-01-31 PROCEDURE — 3080F DIAST BP >= 90 MM HG: CPT | Mod: CPTII,,, | Performed by: PHYSICIAN ASSISTANT

## 2022-01-31 PROCEDURE — 3008F PR BODY MASS INDEX (BMI) DOCUMENTED: ICD-10-PCS | Mod: CPTII,,, | Performed by: PHYSICIAN ASSISTANT

## 2022-01-31 PROCEDURE — 3077F SYST BP >= 140 MM HG: CPT | Mod: CPTII,,, | Performed by: PHYSICIAN ASSISTANT

## 2022-01-31 PROCEDURE — 99214 PR OFFICE/OUTPT VISIT, EST, LEVL IV, 30-39 MIN: ICD-10-PCS | Mod: S$PBB,,, | Performed by: PHYSICIAN ASSISTANT

## 2022-01-31 PROCEDURE — 3077F PR MOST RECENT SYSTOLIC BLOOD PRESSURE >= 140 MM HG: ICD-10-PCS | Mod: CPTII,,, | Performed by: PHYSICIAN ASSISTANT

## 2022-01-31 PROCEDURE — 3080F PR MOST RECENT DIASTOLIC BLOOD PRESSURE >= 90 MM HG: ICD-10-PCS | Mod: CPTII,,, | Performed by: PHYSICIAN ASSISTANT

## 2022-01-31 RX ORDER — ASPIRIN 81 MG/1
81 TABLET ORAL DAILY
COMMUNITY

## 2022-01-31 NOTE — PATIENT INSTRUCTIONS
Patient Education       Chronic Knee Pain Discharge Instructions   About this topic   The knee is a large, complex joint. It is made up of 4 bones: the thigh bone, two lower leg bones, and the kneecap. There is a capsule around the joint. Cartilage acts like a shock absorber in the knee. It reduces friction to help the knee move more smoothly. The knee also has ligaments and tendons. Ligaments are bands of tissue that join one bone to another. Tendons are bands of tissue that join muscles to the bone. There are many muscles around the knee and in front and back of the thigh. If there is a problem with any of these parts of the joint, you can have pain in your knee.             What care is needed at home?   · Ask your doctor what you need to do when you go home. Make sure you ask questions if you do not understand what the doctor says. This way you will know what you need to do.  · Rest. Allow your injury to heal before you do slow movements.  · Place an ice pack or a bag of frozen peas wrapped in a towel over the painful part. Never put ice right on the skin. Do not leave the ice on more than 10 to 15 minutes at a time.  · Prop your leg up on pillows to help with swelling.  · Brace or neoprene sleeve for support and swelling  · Heat may be used later but not right away. Heat can make swelling worse. If your doctor tells you to use heat, put a heating pad on the painful part for no more than 20 minutes at a time. Never go to sleep with a heating pad on as this can cause burns.  What follow-up care is needed?   Your doctor may ask you to make visits to the office to check on your progress. Be sure to keep these visits. Your doctor may send you to physical therapy to help you heal faster.  What drugs may be needed?   The doctor may order drugs to:  · Help with pain and swelling  · Fight an infection  The doctor may give you a shot of an anti-inflammatory drug called a corticosteroid. This will help with swelling. Talk  with your doctor about the risks of this shot.  Will physical activity be limited?   You may need to rest your knee for a while. You should not do physical activity that makes your health problem worse. Talk to your doctor if you run, work out, or play sports. You may not be able to do those things until your health problem gets better.  What can be done to prevent this health problem?   · If your knee pain is due to overuse, do not do movements that caused the problem if possible.  · Take breaks often when doing things that use repeat movements.  · Do not sit or keep your knee in one position for long periods of time.  · If you sleep on your side, use a pillow in between your legs. This can help take stress off of the knee.  · Always warm up and stretch before a workout and cool down after.  · If you are a runner, stretch before a run. Use good ways to train, such as slowly adding to how far you run.  · Run on softer surfaces such as a track. This is easier on your knee than a hard surface like cement.  · Try activities like swimming or biking rather than running. Running can put a lot of stress on your knee joint.  · Stay away from activities that could result in twisting, sudden stops and starts, and blows to the knee. Sports such as basketball, skiing, football, and jogging are some common sports that can lead to knee injuries.  · Wear shoes with good support. Replace your shoes often.  · Keep a healthy weight. Being too heavy puts more stress on the knee joint. This makes the knee more at risk for injury.  · Stay active and work out to keep your muscles strong and flexible.  When do I need to call the doctor?   · Pain or swelling gets worse  · Not able to walk  · Health problem is not better or you are feeling worse  Teach Back: Helping You Understand   The Teach Back Method helps you understand the information we are giving you. After you talk with the staff, tell them in your own words what you learned. This  helps to make sure the staff has described each thing clearly. It also helps to explain things that may have been confusing. Before going home, make sure you can do these:  · I can tell you about my pain.  · I can tell you what may help ease my pain.  · I can tell you what I will do if I have more pain or swelling or I am not able to walk.  Where can I learn more?   [facility] Choices  http://www.nhs.uk/conditions/knee-pain/Pages/Introduction.aspx   Last Reviewed Date   2020-11-16  Consumer Information Use and Disclaimer   This information is not specific medical advice and does not replace information you receive from your health care provider. This is only a brief summary of general information. It does NOT include all information about conditions, illnesses, injuries, tests, procedures, treatments, therapies, discharge instructions or life-style choices that may apply to you. You must talk with your health care provider for complete information about your health and treatment options. This information should not be used to decide whether or not to accept your health care providers advice, instructions or recommendations. Only your health care provider has the knowledge and training to provide advice that is right for you.  Copyright   Copyright © 2021 [employer] and its affiliates and/or licensors. All rights reserved.

## 2022-02-16 ENCOUNTER — OFFICE VISIT (OUTPATIENT)
Dept: GASTROENTEROLOGY | Facility: CLINIC | Age: 57
End: 2022-02-16
Payer: MEDICARE

## 2022-02-16 VITALS
SYSTOLIC BLOOD PRESSURE: 172 MMHG | DIASTOLIC BLOOD PRESSURE: 93 MMHG | WEIGHT: 191 LBS | HEIGHT: 66 IN | OXYGEN SATURATION: 96 % | HEART RATE: 79 BPM | BODY MASS INDEX: 30.7 KG/M2

## 2022-02-16 DIAGNOSIS — K21.9 GASTROESOPHAGEAL REFLUX DISEASE, UNSPECIFIED WHETHER ESOPHAGITIS PRESENT: Primary | ICD-10-CM

## 2022-02-16 DIAGNOSIS — K59.00 CONSTIPATION, UNSPECIFIED CONSTIPATION TYPE: ICD-10-CM

## 2022-02-16 DIAGNOSIS — K29.00 ACUTE SUPERFICIAL GASTRITIS WITHOUT HEMORRHAGE: ICD-10-CM

## 2022-02-16 PROCEDURE — 1159F MED LIST DOCD IN RCRD: CPT | Mod: CPTII,,, | Performed by: NURSE PRACTITIONER

## 2022-02-16 PROCEDURE — 3080F DIAST BP >= 90 MM HG: CPT | Mod: CPTII,,, | Performed by: NURSE PRACTITIONER

## 2022-02-16 PROCEDURE — 99213 PR OFFICE/OUTPT VISIT, EST, LEVL III, 20-29 MIN: ICD-10-PCS | Mod: ,,, | Performed by: NURSE PRACTITIONER

## 2022-02-16 PROCEDURE — 1160F PR REVIEW ALL MEDS BY PRESCRIBER/CLIN PHARMACIST DOCUMENTED: ICD-10-PCS | Mod: CPTII,,, | Performed by: NURSE PRACTITIONER

## 2022-02-16 PROCEDURE — 3077F PR MOST RECENT SYSTOLIC BLOOD PRESSURE >= 140 MM HG: ICD-10-PCS | Mod: CPTII,,, | Performed by: NURSE PRACTITIONER

## 2022-02-16 PROCEDURE — 3008F PR BODY MASS INDEX (BMI) DOCUMENTED: ICD-10-PCS | Mod: CPTII,,, | Performed by: NURSE PRACTITIONER

## 2022-02-16 PROCEDURE — 1160F RVW MEDS BY RX/DR IN RCRD: CPT | Mod: CPTII,,, | Performed by: NURSE PRACTITIONER

## 2022-02-16 PROCEDURE — 1159F PR MEDICATION LIST DOCUMENTED IN MEDICAL RECORD: ICD-10-PCS | Mod: CPTII,,, | Performed by: NURSE PRACTITIONER

## 2022-02-16 PROCEDURE — 3008F BODY MASS INDEX DOCD: CPT | Mod: CPTII,,, | Performed by: NURSE PRACTITIONER

## 2022-02-16 PROCEDURE — 3080F PR MOST RECENT DIASTOLIC BLOOD PRESSURE >= 90 MM HG: ICD-10-PCS | Mod: CPTII,,, | Performed by: NURSE PRACTITIONER

## 2022-02-16 PROCEDURE — 3077F SYST BP >= 140 MM HG: CPT | Mod: CPTII,,, | Performed by: NURSE PRACTITIONER

## 2022-02-16 PROCEDURE — 99213 OFFICE O/P EST LOW 20 MIN: CPT | Mod: ,,, | Performed by: NURSE PRACTITIONER

## 2022-02-16 NOTE — PATIENT INSTRUCTIONS
Miralax powder 1 capful daily in 8 ounces of water  
46 yo female, psh left hip replacement, c/o dizziness, weakness since onset of midsternal chest pain yesterday morning. pt states chest pain began while standing up. pt states chest pain is no longer active, however still feels dizzy. pt also reports having right sided flank pain yesterday, which has now improved. pt reporting pain to right hamstring area since this morning. 20g iv insert to right ac, ns infusing, labs sent. pt denies fever, headahce, n/v/d, abdominal tenderness, dysuria.

## 2022-02-16 NOTE — PROGRESS NOTES
Audrey Causey is a 56 y.o. female here for Follow-up        PCP: Carissa Barker  Referring Provider: No referring provider defined for this encounter.     HPI:  Presents for follow up GERD. She is taking Omeprazole 40 mg daily. Denies abdominal pain, nausea and vomiting. She had Cholecystectomy 21. Since that time she has felt much better. Last EGD 21 with gastritis. She states that her bowels are only moving once per week. Stool is soft. Last colonoscopy 21, no polyps. Recommendation to repeat in 5 years due to history of sessile serrated adenoma.         ROS:  Review of Systems   Constitutional: Negative for appetite change, fatigue, fever and unexpected weight change.   HENT: Negative for trouble swallowing.    Respiratory: Negative for shortness of breath.    Cardiovascular: Negative for chest pain.   Gastrointestinal: Positive for constipation. Negative for abdominal pain, blood in stool, change in bowel habit, diarrhea, nausea, vomiting, reflux and change in bowel habit.   Genitourinary: Negative for dysuria.   Musculoskeletal: Negative for gait problem.   Integumentary:  Negative for pallor.   Neurological: Negative for dizziness, weakness and light-headedness.   Hematological: Does not bruise/bleed easily.   Psychiatric/Behavioral: The patient is not nervous/anxious.           PMHX:  has a past medical history of Acute superficial gastritis without hemorrhage (2021), Diabetes mellitus, Diverticula, colon (2021), Esophageal dysphagia (2021), GERD (gastroesophageal reflux disease), Hypertension, Low back pain, Lumbar radiculopathy, Seizures, and Sleep apnea.    PSHX:  has a past surgical history that includes Colonoscopy (2018); Esophagogastroduodenoscopy (2018); Hysterectomy;  section; Eye surgery; Injection of facet joint (Bilateral, 2017); Epidural steroid injection (2018); Epidural steroid injection (2017); Laparoscopic cholecystectomy  (N/A, 7/22/2021); and Epidural steroid injection (N/A, 1/18/2022).    PFHX: family history includes Breast cancer in her mother; Diabetes type II in her mother; Heart attack in her mother; Hypertension in her mother; Stroke in her mother; Thyroid cancer in her mother.    PSlHX:  reports that she has never smoked. She has never used smokeless tobacco. She reports that she does not drink alcohol and does not use drugs.        Review of patient's allergies indicates:  No Known Allergies    Medication List with Changes/Refills   Current Medications    ASPIRIN (ECOTRIN) 81 MG EC TABLET    Take 81 mg by mouth once daily.    BUSPIRONE (BUSPAR) 15 MG TABLET    Take 1 tablet by mouth once daily.    CARVEDILOL (COREG) 12.5 MG TABLET    Take 1 tablet by mouth once daily.    CITALOPRAM (CELEXA) 20 MG TABLET    Take 1 tablet by mouth once daily.    DORZOLAMIDE-TIMOLOL 2-0.5% (COSOPT) 22.3-6.8 MG/ML OPHTHALMIC SOLUTION    Place 1 drop into both eyes 2 (two) times a day.    ESTRADIOL (ESTRACE) 2 MG TABLET    Take 1 tablet by mouth once daily.    GABAPENTIN (NEURONTIN) 400 MG CAPSULE    Take 1 capsule (400 mg total) by mouth every 12 (twelve) hours.    HYDRALAZINE (APRESOLINE) 100 MG TABLET    Take 1 tablet by mouth 3 (three) times daily.    LISINOPRIL-HYDROCHLOROTHIAZIDE (PRINZIDE,ZESTORETIC) 20-25 MG TAB    Take 1 tablet by mouth once daily.    METFORMIN (GLUCOPHAGE) 1000 MG TABLET    Take 1 tablet by mouth 2 (two) times a day.    METOCLOPRAMIDE HCL (REGLAN) 10 MG TABLET    Take 1 tablet by mouth 4 (four) times daily before meals and nightly.    METRONIDAZOLE (FLAGYL) 500 MG TABLET    Take 1 tablet by mouth 2 (two) times a day.    OMEPRAZOLE (PRILOSEC) 40 MG CAPSULE    Take 1 capsule by mouth 2 (two) times a day.    OXYCODONE-ACETAMINOPHEN (PERCOCET)  MG PER TABLET    Take 1 tablet by mouth every 12 (twelve) hours.    POTASSIUM CHLORIDE SA (K-DUR,KLOR-CON) 20 MEQ TABLET    Take 1 tablet by mouth 2 (two) times a day.     "QUETIAPINE (SEROQUEL) 50 MG TABLET    Take 1 tablet by mouth every evening.    RHOPRESSA 0.02 % OPHTHALMIC SOLUTION    Place 1 drop into both eyes once daily.    TRAVOPROST (TRAVATAN Z) 0.004 % OPHTHALMIC SOLUTION    Place 1 drop into both eyes once daily.        Objective Findings:  Vital Signs:  BP (!) 172/93   Pulse 79   Ht 5' 6" (1.676 m)   Wt 86.6 kg (191 lb)   SpO2 96%   BMI 30.83 kg/m²  Body mass index is 30.83 kg/m².    Physical Exam:  Physical Exam  Vitals and nursing note reviewed.   Constitutional:       General: She is not in acute distress.     Appearance: Normal appearance.   HENT:      Mouth/Throat:      Mouth: Mucous membranes are moist.   Eyes:      General: No scleral icterus.  Cardiovascular:      Rate and Rhythm: Normal rate and regular rhythm.      Heart sounds: No murmur heard.      Pulmonary:      Breath sounds: No wheezing, rhonchi or rales.   Abdominal:      General: Bowel sounds are normal. There is no distension.      Palpations: Abdomen is soft. There is no mass.      Tenderness: There is no abdominal tenderness. There is no guarding or rebound.      Hernia: No hernia is present.   Musculoskeletal:      Right lower leg: No edema.      Left lower leg: No edema.   Skin:     General: Skin is warm and dry.      Coloration: Skin is not jaundiced or pale.      Findings: No bruising or rash.   Neurological:      Mental Status: She is alert and oriented to person, place, and time.   Psychiatric:         Mood and Affect: Mood normal.          Labs:  Lab Results   Component Value Date    WBC 6.12 07/23/2021    HGB 10.4 (L) 07/23/2021    HCT 32.5 (L) 07/23/2021    MCV 88.1 07/23/2021    RDW 12.9 07/23/2021     07/23/2021    LYMPH 25.8 (L) 07/23/2021    LYMPH 1.58 07/23/2021    MONO 5.9 07/23/2021    EOS 0.05 07/23/2021    BASO 0.02 07/23/2021     Lab Results   Component Value Date     07/23/2021    K 2.9 (L) 07/23/2021     07/23/2021    CO2 29 07/23/2021    GLU 90 07/23/2021 "    BUN 9 07/23/2021    CREATININE 0.66 07/23/2021    CALCIUM 7.8 (L) 07/23/2021    PROT 6.3 (L) 07/23/2021    ALBUMIN 2.5 (L) 07/23/2021    BILITOT 0.3 07/23/2021    ALKPHOS 69 07/23/2021    AST 19 07/23/2021    ALT 21 07/23/2021         Imaging: [facility] Fluoro for Pain Management    Result Date: 1/18/2022  See OP Notes for results. IMPRESSION: See OP Notes for results. This procedure was auto-finalized by: Virtual Radiologist        Assessment:  Audrey Causey is a 56 y.o. female here with:  1. Gastroesophageal reflux disease, unspecified whether esophagitis present    2. Acute superficial gastritis without hemorrhage    3. Constipation, unspecified constipation type          Recommendations:  1. Continue Omeprazole  2. Avoid spicy and greasy foods. No citrus acids. Do not lay down within 3 hours of eating.  3. Miralax powder 1 capful daily in 8 ounces of water for constipation    Follow up in about 1 year (around 2/16/2023).      Order summary:       Thank you for allowing me to participate in the care of Audrey Causey.      KYA Saavedra

## 2022-02-28 RX ORDER — OXYCODONE AND ACETAMINOPHEN 10; 325 MG/1; MG/1
1 TABLET ORAL EVERY 12 HOURS
Qty: 60 TABLET | Refills: 0 | Status: CANCELLED | OUTPATIENT
Start: 2022-02-28 | End: 2022-03-30

## 2022-02-28 NOTE — H&P (VIEW-ONLY)
Disclaimer:  This note has been generated using voice recognition software.  There may be type of graft focal areas that have been missed during a proof reading      Subjective:      Patient ID: Audrey Causey is a 56 y.o. female.    Chief Complaint: Low-back Pain and Knee Pain (Left/)      Pain  This is a chronic problem. The current episode started more than 1 year ago. The problem occurs daily. The problem has been waxing and waning. Associated symptoms include arthralgias and neck pain. Pertinent negatives include no change in bowel habit, chest pain, chills, coughing, diaphoresis, fever, rash, sore throat, vertigo or vomiting.     Review of Systems   Constitutional: Negative for appetite change, chills, diaphoresis and fever.   HENT: Negative for drooling, ear discharge, ear pain, facial swelling, nosebleeds, sore throat, trouble swallowing, voice change and goiter.    Eyes: Negative for photophobia, pain, discharge, redness and visual disturbance.   Respiratory: Negative for apnea, cough, choking, chest tightness, shortness of breath, wheezing and stridor.    Cardiovascular: Negative for chest pain, palpitations and leg swelling.   Gastrointestinal: Negative for abdominal distention, change in bowel habit, diarrhea, rectal pain, vomiting, fecal incontinence and change in bowel habit.   Endocrine: Negative for cold intolerance, heat intolerance, polydipsia, polyphagia and polyuria.   Genitourinary: Negative for bladder incontinence, dysuria, flank pain, frequency and hot flashes.   Musculoskeletal: Positive for arthralgias, back pain, leg pain, neck pain and neck stiffness.   Integumentary:  Negative for color change, pallor and rash.   Allergic/Immunologic: Negative for immunocompromised state.   Neurological: Negative for dizziness, vertigo, seizures, syncope, facial asymmetry, speech difficulty, light-headedness, disturbances in coordination, memory loss and coordination difficulties.   Hematological: Negative  "for adenopathy. Does not bruise/bleed easily.   Psychiatric/Behavioral: Negative for agitation, behavioral problems, confusion, decreased concentration, dysphoric mood, hallucinations, self-injury and suicidal ideas. The patient is not nervous/anxious and is not hyperactive.             Objective:  Vitals:    03/03/22 0859   BP: (!) 213/98   Pulse: 76   Weight: 87.5 kg (193 lb)   Height: 5' 6" (1.676 m)   PainSc:   9   PainLoc: Back         Physical Exam  Vitals and nursing note reviewed. Exam conducted with a chaperone present.   Constitutional:       General: She is awake.      Appearance: Normal appearance. She is not ill-appearing or diaphoretic.   HENT:      Head: Normocephalic and atraumatic.      Nose: Nose normal.      Mouth/Throat:      Mouth: Mucous membranes are moist.      Pharynx: Oropharynx is clear.   Eyes:      Conjunctiva/sclera: Conjunctivae normal.      Pupils: Pupils are equal, round, and reactive to light.   Cardiovascular:      Rate and Rhythm: Normal rate.   Pulmonary:      Effort: Pulmonary effort is normal. No respiratory distress.   Abdominal:      Palpations: Abdomen is soft.      Tenderness: There is no guarding.   Musculoskeletal:         General: Normal range of motion.      Cervical back: Normal range of motion and neck supple. Tenderness present. No rigidity.      Thoracic back: Tenderness present.      Lumbar back: Tenderness present.      Right knee: Tenderness present.      Left knee: Tenderness present.   Skin:     General: Skin is warm and dry.      Coloration: Skin is not jaundiced or pale.   Neurological:      General: No focal deficit present.      Mental Status: She is alert and oriented to person, place, and time. Mental status is at baseline.      Cranial Nerves: Cranial nerves are intact. No cranial nerve deficit (II-XII).   Psychiatric:         Mood and Affect: Mood normal.         Behavior: Behavior normal. Behavior is cooperative.         Thought Content: Thought " content normal.           FL Fluoro for Pain Management  See OP Notes for results.     IMPRESSION: See OP Notes for results.     This procedure was auto-finalized by: Virtual Radiologist       Admission on 01/18/2022, Discharged on 01/18/2022   Component Date Value Ref Range Status    POC Glucose 01/18/2022 100  70 - 110 MG/DL Final    POC Glucose 01/18/2022 100  70 - 105 mg/dL Final   Lab Visit on 01/14/2022   Component Date Value Ref Range Status    COVID-19 Ag 01/14/2022 Negative  Negative, Invalid Final   Office Visit on 01/05/2022   Component Date Value Ref Range Status    pH, UA 01/05/2022 5.5  5.0, 5.5, 6.0, 6.5, 7.0, 7.5, 8.0 pH Units Final    Specific Gravity, UA 01/05/2022 1.020  <=1.005, 1.010, 1.015, 1.020, 1.025, 1.030 Final    Creatinine, Urine 01/05/2022 65  28 - 219 mg/dL Final    6-Acetylmorphine 01/05/2022 Negative  10 ng/mL Final    7-Aminoclonazepam 01/05/2022 Negative  Negative 25 ng/mL Final    a-Hydroxyalprazolam 01/05/2022 Negative  25 ng/mL Final    Acetyl Fentanyl 01/05/2022 Negative  2.5 ng/mL Final    Acetyl Norfentanyl Oxalate 01/05/2022 Negative  5 ng/mL Final    Benzoylecgonine 01/05/2022 Negative  100 ng/mL Final    Buprenorphine 01/05/2022 Negative  25 ng/mL Final    Codeine 01/05/2022 Negative  25 ng/mL Final    EDDP 01/05/2022 Negative  25 ng/mL Final    Fentanyl 01/05/2022 Negative  2.5 ng/mL Final    Hydrocodone 01/05/2022 Negative  25 ng/mL Final    Hydromorphone 01/05/2022 Negative  25 ng/mL Final    Lorazepam 01/05/2022 Negative  25 ng/mL Final    Morphine 01/05/2022 Negative  25 ng/mL Final    Norbuprenorphine 01/05/2022 Negative  25 ng/mL Final    Nordiazepam 01/05/2022 Negative  25 ng/mL Final    Norfentanyl Oxalate 01/05/2022 Negative  5 ng/mL Final    Norhydrocodone 01/05/2022 Negative  50 ng/mL Final    Noroxycodone HCL 01/05/2022 Negative  50 ng/mL Final    Oxazepam 01/05/2022 Negative  25 ng/mL Final    Oxymorphone 01/05/2022 Negative  25  ng/mL Final    Tapentadol 01/05/2022 Negative  25 ng/mL Final    Temazepam 01/05/2022 Negative  25 ng/mL Final    THC-COOH 01/05/2022 Negative  25 ng/mL Final    Tramadol 01/05/2022 Negative  100 ng/mL Final    Amphetamine, Urine 01/05/2022 Negative  Negative 100 ng/mL Final    Methamphetamines, Urine 01/05/2022 Negative  Negative 100 ng/mL Final    Methadone, Urine 01/05/2022 Negative  Negative 25 ng/mL Final    Oxycodone, Urine 01/05/2022 Negative  Negative 25 ng/mL Final   Office Visit on 12/20/2021   Component Date Value Ref Range Status    POC Amphetamines 12/20/2021 Negative  Negative, Inconclusive Final    POC Barbiturates 12/20/2021 Negative  Negative, Inconclusive Final    POC Benzodiazepines 12/20/2021 Negative  Negative, Inconclusive Final    POC Cocaine 12/20/2021 Negative  Negative, Inconclusive Final    POC THC 12/20/2021 Negative  Negative, Inconclusive Final    POC Methadone 12/20/2021 Negative  Negative, Inconclusive Final    POC Methamphetamine 12/20/2021 Negative  Negative, Inconclusive Final    POC Opiates 12/20/2021 Negative  Negative, Inconclusive Final    POC Oxycodone 12/20/2021 Negative  Negative, Inconclusive Final    POC Phencyclidine 12/20/2021 Negative  Negative, Inconclusive Final    POC Methylenedioxymethamphetamine * 12/20/2021 Negative  Negative, Inconclusive Final    POC Tricyclic Antidepressants 12/20/2021 Negative  Negative, Inconclusive Final    POC Buprenorphine 12/20/2021 Negative   Final     Acceptable 12/20/2021 Yes   Final    POC Temperature (Urine) 12/20/2021 94   Final   Hospital Outpatient Visit on 09/20/2021   Component Date Value Ref Range Status    POC Glucose 09/20/2021 93  70 - 110 MG/DL Final   Lab Visit on 09/16/2021   Component Date Value Ref Range Status    POC Rapid COVID 09/16/2021 Negative  Negative Final     Acceptable 09/16/2021 Yes   Final           Assessment:      1. Lumbar radiculopathy, chronic     2. Chronic pain of right knee    3. Osteoarthrosis multiple sites, not specified as generalized    4. Vitamin D deficient osteomalacia    5. Myalgia    6. Chronic fatigue    7. Screening for viral disease    8. Chronic pain of left knee          Orders Placed This Encounter   Procedures    X-Ray Knee 3 View Left     Standing Status:   Future     Standing Expiration Date:   5/3/2022     Order Specific Question:   May the Radiologist modify the order per protocol to meet the clinical needs of the patient?     Answer:   Yes     Order Specific Question:   Release to patient     Answer:   Immediate    CBC Auto Differential     Standing Status:   Future     Standing Expiration Date:   6/3/2022    Comprehensive Metabolic Panel     Standing Status:   Future     Standing Expiration Date:   6/3/2022    Rheumatoid Quantitative     Standing Status:   Future     Standing Expiration Date:   6/3/2022    HLA-B27 antigen     Standing Status:   Future     Standing Expiration Date:   6/3/2022    C-Reactive Protein     Standing Status:   Future     Standing Expiration Date:   6/3/2022    Sedimentation Rate     Standing Status:   Future     Standing Expiration Date:   6/3/2022    Treponema Pallidum (Syphillis) Antibody     Standing Status:   Future     Standing Expiration Date:   6/3/2022    T4, Free     Standing Status:   Future     Standing Expiration Date:   6/3/2022    TSH     Standing Status:   Future     Standing Expiration Date:   6/3/2022    Lupus Anticoagulant Eval w/Reflex     Standing Status:   Future     Standing Expiration Date:   6/3/2022    Vitamin D     Standing Status:   Future     Standing Expiration Date:   6/3/2022    HIV-1 RNA Detection and Quantification     Standing Status:   Future     Standing Expiration Date:   6/3/2022    Hepatitis C Antibody     Standing Status:   Future     Standing Expiration Date:   6/3/2022     Order Specific Question:   Release to patient     Answer:   Immediate    Uric  Acid     Standing Status:   Future     Standing Expiration Date:   6/3/2022    Antistreptolysin O (ASO)     Standing Status:   Future     Standing Expiration Date:   6/3/2022    Anti-DNA Ab, Double-Stranded     Standing Status:   Future     Standing Expiration Date:   6/3/2022    C3 Complement     Standing Status:   Future     Standing Expiration Date:   5/3/2022    C4 Complement     Standing Status:   Future     Standing Expiration Date:   5/3/2022    Complement, Total     Standing Status:   Future     Standing Expiration Date:   5/3/2022    Ehrlichia Antibody Panel     Standing Status:   Future     Standing Expiration Date:   6/3/2022    Spotted Fever Group Antibodies     Standing Status:   Future     Standing Expiration Date:   6/3/2022    Lyme Antibody w/ Immunoblot Reflex     Standing Status:   Future     Standing Expiration Date:   6/3/2022    CK     Standing Status:   Future     Standing Expiration Date:   6/3/2022    COVID-19 Routine Screening     Standing Status:   Future     Standing Expiration Date:   5/2/2023     Order Specific Question:   Is the patient symptomatic?     Answer:   No     Order Specific Question:   Is this needed for pre-procedure or pre-op testing?     Answer:   Yes     Order Specific Question:   Diagnosis:     Answer:   Pre-op testing [[id number]]    Case Request Operating Room: Injection,steroid,epidural,transforaminal approach, L4/5     Order Specific Question:   Medical Necessity:     Answer:   Medically Non-Urgent [100]     Order Specific Question:   CPT Code:     Answer:   RI EPIDURAL INJ, ANES/STEROID, TRANSFORAMINAL, LUMB/SACR, SNGL LEVL [21511]     Order Specific Question:   Is an on-site pathologist required for this procedure?     Answer:   N/A         A's of Opioid Risk Assessment  Activity:Patient can perform ADL.   Analgesia:Patients pain is partially controlled by current medication. Patient has tried OTC medications such as Tylenol and Ibuprofen with out relief.    Adverse Effects: Patient denies constipation or sedation.  Aberrant Behavior:  reviewed with no aberrant drug seeking/taking behavior.  Overdose reversal drug naloxone discussed    Drug screen reviewed      Requested Prescriptions     Signed Prescriptions Disp Refills    gabapentin (NEURONTIN) 400 MG capsule 60 capsule 1     Sig: Take 1 capsule (400 mg total) by mouth every 12 (twelve) hours.    oxyCODONE-acetaminophen (PERCOCET)  mg per tablet 60 tablet 0     Sig: Take 1 tablet by mouth every 12 (twelve) hours.         Plan:    She had lumbar L4/5 MARCOS # 1 January 18, 2022  She states she had 80% relief after procedure 70% relief ongoing    Complaint increasing multiple joint pain upper lower extremities left knee pain    X-ray right knee [facility] December 20, 2021, mild spurring changes   moderate tricompartmental degenerative changes  No fracture noted    X-ray lumbar spine [facility] December 20, 2021, degenerative changes noted no abnormal motion noted    She is requesting lumbar procedure for her back and leg pain radicular nature, pain numbness and tingling bilateral lower extremities worse with standing walking better with short periods resting    Requesting evaluation for her multiple joint pain fatigue and myalgias    Requesting Toradol injection for back and joint pain    Toradol 60 mg IM, tolerated well    Will order labs tick bite panels inflammatory arthropathies vitamin-D level she has a history of vitamin-D deficiency    Continue current medication    Continue home exercise program as directed    Indications for this procedure for this specific patient include the following     - Injections being provided as part of a comprehensive pain management program.    - Pt has failed 6 weeks of conservative therapy including oral meds, PT and or home exercise program which has been discussed with the patient   - Injection being provided for suspected radicular pain.    - Pain scale of  greater than or equal to 3/10 with functional impairment  - No evidence of local or systemic infection, bleeding tendency or unstable medical condition.    - Pain is causing significant functional limitation resulting in diminished quality of life and impaired age appropriate ADL's.   - Repeat injections are done no sooner than 7 days after the previous injection  - Epidural done for suspected radicular pain along dermatome of nerve   - Epidural done to differentiate level of radicular nerve root pain   - Repeat injections done only when pt reports 50% improvement in pain from previous injections    - patient is aware if they take any NSAIDs/anticoagulant prior to procedure procedure will be postponed, this was listed on the preop instructions and highlighted, list of NSAIDs/anticoagulant reviewed with patient to include methotrexate  - Injection done at L4/5 bilateral level(s) which is consistent with patient's dermatomal pain complaint    Monitor anesthesia request is medically indicated for the scheduled nerve block procedure due to:  1- needle phobia and anxiety, placing  the patient at risk during the provided service.  2-patient has an ASA class greater than 3 and requires constant presence of an anesthesiologist during the procedure,   3-patient has severe problems hard to lie still  4-patient suffers from chronic pain and is unable to function due to  diminished ADLs    Schedule L4/5 bilateral TFESI # 2, lumbar radiculopathy    Dr. Soto    Bring original prescription medication bottles/container/box with labels to each visit

## 2022-02-28 NOTE — PROGRESS NOTES
Disclaimer:  This note has been generated using voice recognition software.  There may be type of graft focal areas that have been missed during a proof reading      Subjective:      Patient ID: Audrey Causey is a 56 y.o. female.    Chief Complaint: Low-back Pain and Knee Pain (Left/)      Pain  This is a chronic problem. The current episode started more than 1 year ago. The problem occurs daily. The problem has been waxing and waning. Associated symptoms include arthralgias and neck pain. Pertinent negatives include no change in bowel habit, chest pain, chills, coughing, diaphoresis, fever, rash, sore throat, vertigo or vomiting.     Review of Systems   Constitutional: Negative for appetite change, chills, diaphoresis and fever.   HENT: Negative for drooling, ear discharge, ear pain, facial swelling, nosebleeds, sore throat, trouble swallowing, voice change and goiter.    Eyes: Negative for photophobia, pain, discharge, redness and visual disturbance.   Respiratory: Negative for apnea, cough, choking, chest tightness, shortness of breath, wheezing and stridor.    Cardiovascular: Negative for chest pain, palpitations and leg swelling.   Gastrointestinal: Negative for abdominal distention, change in bowel habit, diarrhea, rectal pain, vomiting, fecal incontinence and change in bowel habit.   Endocrine: Negative for cold intolerance, heat intolerance, polydipsia, polyphagia and polyuria.   Genitourinary: Negative for bladder incontinence, dysuria, flank pain, frequency and hot flashes.   Musculoskeletal: Positive for arthralgias, back pain, leg pain, neck pain and neck stiffness.   Integumentary:  Negative for color change, pallor and rash.   Allergic/Immunologic: Negative for immunocompromised state.   Neurological: Negative for dizziness, vertigo, seizures, syncope, facial asymmetry, speech difficulty, light-headedness, disturbances in coordination, memory loss and coordination difficulties.   Hematological: Negative  "for adenopathy. Does not bruise/bleed easily.   Psychiatric/Behavioral: Negative for agitation, behavioral problems, confusion, decreased concentration, dysphoric mood, hallucinations, self-injury and suicidal ideas. The patient is not nervous/anxious and is not hyperactive.             Objective:  Vitals:    03/03/22 0859   BP: (!) 213/98   Pulse: 76   Weight: 87.5 kg (193 lb)   Height: 5' 6" (1.676 m)   PainSc:   9   PainLoc: Back         Physical Exam  Vitals and nursing note reviewed. Exam conducted with a chaperone present.   Constitutional:       General: She is awake.      Appearance: Normal appearance. She is not ill-appearing or diaphoretic.   HENT:      Head: Normocephalic and atraumatic.      Nose: Nose normal.      Mouth/Throat:      Mouth: Mucous membranes are moist.      Pharynx: Oropharynx is clear.   Eyes:      Conjunctiva/sclera: Conjunctivae normal.      Pupils: Pupils are equal, round, and reactive to light.   Cardiovascular:      Rate and Rhythm: Normal rate.   Pulmonary:      Effort: Pulmonary effort is normal. No respiratory distress.   Abdominal:      Palpations: Abdomen is soft.      Tenderness: There is no guarding.   Musculoskeletal:         General: Normal range of motion.      Cervical back: Normal range of motion and neck supple. Tenderness present. No rigidity.      Thoracic back: Tenderness present.      Lumbar back: Tenderness present.      Right knee: Tenderness present.      Left knee: Tenderness present.   Skin:     General: Skin is warm and dry.      Coloration: Skin is not jaundiced or pale.   Neurological:      General: No focal deficit present.      Mental Status: She is alert and oriented to person, place, and time. Mental status is at baseline.      Cranial Nerves: Cranial nerves are intact. No cranial nerve deficit (II-XII).   Psychiatric:         Mood and Affect: Mood normal.         Behavior: Behavior normal. Behavior is cooperative.         Thought Content: Thought " content normal.           [facility]  See OP Notes for results.     IMPRESSION: See OP Notes for results.     This procedure was auto-finalized by: Virtual Radiologist       Admission on 01/18/2022, Discharged on 01/18/2022   Component Date Value Ref Range Status    POC Glucose 01/18/2022 100  70 - 110 MG/DL Final    POC Glucose 01/18/2022 100  70 - 105 mg/dL Final   Lab Visit on 01/14/2022   Component Date Value Ref Range Status    COVID-19 Ag 01/14/2022 Negative  Negative, Invalid Final   Office Visit on 01/05/2022   Component Date Value Ref Range Status    pH, UA 01/05/2022 5.5  5.0, 5.5, 6.0, 6.5, 7.0, 7.5, 8.0 pH Units Final    Specific Gravity, UA 01/05/2022 1.020  <=1.005, 1.010, 1.015, 1.020, 1.025, 1.030 Final    Creatinine, Urine 01/05/2022 65  28 - 219 mg/dL Final    6-Acetylmorphine 01/05/2022 Negative  10 ng/mL Final    7-Aminoclonazepam 01/05/2022 Negative  Negative 25 ng/mL Final    a-Hydroxyalprazolam 01/05/2022 Negative  25 ng/mL Final    Acetyl Fentanyl 01/05/2022 Negative  2.5 ng/mL Final    Acetyl Norfentanyl Oxalate 01/05/2022 Negative  5 ng/mL Final    Benzoylecgonine 01/05/2022 Negative  100 ng/mL Final    Buprenorphine 01/05/2022 Negative  25 ng/mL Final    Codeine 01/05/2022 Negative  25 ng/mL Final    EDDP 01/05/2022 Negative  25 ng/mL Final    Fentanyl 01/05/2022 Negative  2.5 ng/mL Final    Hydrocodone 01/05/2022 Negative  25 ng/mL Final    Hydromorphone 01/05/2022 Negative  25 ng/mL Final    Lorazepam 01/05/2022 Negative  25 ng/mL Final    Morphine 01/05/2022 Negative  25 ng/mL Final    Norbuprenorphine 01/05/2022 Negative  25 ng/mL Final    Nordiazepam 01/05/2022 Negative  25 ng/mL Final    Norfentanyl Oxalate 01/05/2022 Negative  5 ng/mL Final    Norhydrocodone 01/05/2022 Negative  50 ng/mL Final    Noroxycodone HCL 01/05/2022 Negative  50 ng/mL Final    Oxazepam 01/05/2022 Negative  25 ng/mL Final    Oxymorphone 01/05/2022 Negative  25  ng/mL Final    Tapentadol 01/05/2022 Negative  25 ng/mL Final    Temazepam 01/05/2022 Negative  25 ng/mL Final    THC-COOH 01/05/2022 Negative  25 ng/mL Final    Tramadol 01/05/2022 Negative  100 ng/mL Final    Amphetamine, Urine 01/05/2022 Negative  Negative 100 ng/mL Final    Methamphetamines, Urine 01/05/2022 Negative  Negative 100 ng/mL Final    Methadone, Urine 01/05/2022 Negative  Negative 25 ng/mL Final    Oxycodone, Urine 01/05/2022 Negative  Negative 25 ng/mL Final   Office Visit on 12/20/2021   Component Date Value Ref Range Status    POC Amphetamines 12/20/2021 Negative  Negative, Inconclusive Final    POC Barbiturates 12/20/2021 Negative  Negative, Inconclusive Final    POC Benzodiazepines 12/20/2021 Negative  Negative, Inconclusive Final    POC Cocaine 12/20/2021 Negative  Negative, Inconclusive Final    POC THC 12/20/2021 Negative  Negative, Inconclusive Final    POC Methadone 12/20/2021 Negative  Negative, Inconclusive Final    POC Methamphetamine 12/20/2021 Negative  Negative, Inconclusive Final    POC Opiates 12/20/2021 Negative  Negative, Inconclusive Final    POC Oxycodone 12/20/2021 Negative  Negative, Inconclusive Final    POC Phencyclidine 12/20/2021 Negative  Negative, Inconclusive Final    POC Methylenedioxymethamphetamine * 12/20/2021 Negative  Negative, Inconclusive Final    POC Tricyclic Antidepressants 12/20/2021 Negative  Negative, Inconclusive Final    POC Buprenorphine 12/20/2021 Negative   Final     Acceptable 12/20/2021 Yes   Final    POC Temperature (Urine) 12/20/2021 94   Final   Hospital Outpatient Visit on 09/20/2021   Component Date Value Ref Range Status    POC Glucose 09/20/2021 93  70 - 110 MG/DL Final   Lab Visit on 09/16/2021   Component Date Value Ref Range Status    POC Rapid COVID 09/16/2021 Negative  Negative Final     Acceptable 09/16/2021 Yes   Final           Assessment:      1. Lumbar radiculopathy, chronic     2. Chronic pain of right knee    3. Osteoarthrosis multiple sites, not specified as generalized    4. Vitamin D deficient osteomalacia    5. Myalgia    6. Chronic fatigue    7. Screening for viral disease    8. Chronic pain of left knee          Orders Placed This Encounter   Procedures    X-Ray Knee 3 View Left     Standing Status:   Future     Standing Expiration Date:   5/3/2022     Order Specific Question:   May the Radiologist modify the order per protocol to meet the clinical needs of the patient?     Answer:   Yes     Order Specific Question:   Release to patient     Answer:   Immediate    CBC Auto Differential     Standing Status:   Future     Standing Expiration Date:   6/3/2022    Comprehensive Metabolic Panel     Standing Status:   Future     Standing Expiration Date:   6/3/2022    Rheumatoid Quantitative     Standing Status:   Future     Standing Expiration Date:   6/3/2022    HLA-B27 antigen     Standing Status:   Future     Standing Expiration Date:   6/3/2022    C-Reactive Protein     Standing Status:   Future     Standing Expiration Date:   6/3/2022    Sedimentation Rate     Standing Status:   Future     Standing Expiration Date:   6/3/2022    Treponema Pallidum (Syphillis) Antibody     Standing Status:   Future     Standing Expiration Date:   6/3/2022    T4, Free     Standing Status:   Future     Standing Expiration Date:   6/3/2022    TSH     Standing Status:   Future     Standing Expiration Date:   6/3/2022    Lupus Anticoagulant Eval w/Reflex     Standing Status:   Future     Standing Expiration Date:   6/3/2022    Vitamin D     Standing Status:   Future     Standing Expiration Date:   6/3/2022    HIV-1 RNA Detection and Quantification     Standing Status:   Future     Standing Expiration Date:   6/3/2022    Hepatitis C Antibody     Standing Status:   Future     Standing Expiration Date:   6/3/2022     Order Specific Question:   Release to patient     Answer:   Immediate    Uric  Acid     Standing Status:   Future     Standing Expiration Date:   6/3/2022    Antistreptolysin O (ASO)     Standing Status:   Future     Standing Expiration Date:   6/3/2022    Anti-DNA Ab, Double-Stranded     Standing Status:   Future     Standing Expiration Date:   6/3/2022    C3 Complement     Standing Status:   Future     Standing Expiration Date:   5/3/2022    C4 Complement     Standing Status:   Future     Standing Expiration Date:   5/3/2022    Complement, Total     Standing Status:   Future     Standing Expiration Date:   5/3/2022    Ehrlichia Antibody Panel     Standing Status:   Future     Standing Expiration Date:   6/3/2022    Spotted Fever Group Antibodies     Standing Status:   Future     Standing Expiration Date:   6/3/2022    Lyme Antibody w/ Immunoblot Reflex     Standing Status:   Future     Standing Expiration Date:   6/3/2022    CK     Standing Status:   Future     Standing Expiration Date:   6/3/2022    COVID-19 Routine Screening     Standing Status:   Future     Standing Expiration Date:   5/2/2023     Order Specific Question:   Is the patient symptomatic?     Answer:   No     Order Specific Question:   Is this needed for pre-procedure or pre-op testing?     Answer:   Yes     Order Specific Question:   Diagnosis:     Answer:   Pre-op testing [037310]    Case Request Operating Room: Injection,steroid,epidural,transforaminal approach, L4/5     Order Specific Question:   Medical Necessity:     Answer:   Medically Non-Urgent [100]     Order Specific Question:   CPT Code:     Answer:   [place] EPIDURAL INJ, ANES/STEROID, TRANSFORAMINAL, LUMB/SACR, SNGL LEVL [81284]     Order Specific Question:   Is an on-site pathologist required for this procedure?     Answer:   N/A         A's of Opioid Risk Assessment  Activity:Patient can perform ADL.   Analgesia:Patients pain is partially controlled by current medication. Patient has tried OTC medications such as Tylenol and Ibuprofen with out relief.    Adverse Effects: Patient denies constipation or sedation.  Aberrant Behavior:  reviewed with no aberrant drug seeking/taking behavior.  Overdose reversal drug naloxone discussed    Drug screen reviewed      Requested Prescriptions     Signed Prescriptions Disp Refills    gabapentin (NEURONTIN) 400 MG capsule 60 capsule 1     Sig: Take 1 capsule (400 mg total) by mouth every 12 (twelve) hours.    oxyCODONE-acetaminophen (PERCOCET)  mg per tablet 60 tablet 0     Sig: Take 1 tablet by mouth every 12 (twelve) hours.         Plan:    She had lumbar L4/5 MARCOS # 1 January 18, 2022  She states she had 80% relief after procedure 70% relief ongoing    Complaint increasing multiple joint pain upper lower extremities left knee pain    X-ray right knee [facility] December 20, 2021, mild spurring changes   moderate tricompartmental degenerative changes  No fracture noted    X-ray lumbar spine [facility] December 20, 2021, degenerative changes noted no abnormal motion noted    She is requesting lumbar procedure for her back and leg pain radicular nature, pain numbness and tingling bilateral lower extremities worse with standing walking better with short periods resting    Requesting evaluation for her multiple joint pain fatigue and myalgias    Requesting Toradol injection for back and joint pain    Toradol 60 mg IM, tolerated well    Will order labs tick bite panels inflammatory arthropathies vitamin-D level she has a history of vitamin-D deficiency    Continue current medication    Continue home exercise program as directed    Indications for this procedure for this specific patient include the following     - Injections being provided as part of a comprehensive pain management program.    - Pt has failed 6 weeks of conservative therapy including oral meds, PT and or home exercise program which has been discussed with the patient   - Injection being provided for suspected radicular pain.    - Pain scale of  greater than or equal to 3/10 with functional impairment  - No evidence of local or systemic infection, bleeding tendency or unstable medical condition.    - Pain is causing significant functional limitation resulting in diminished quality of life and impaired age appropriate ADL's.   - Repeat injections are done no sooner than 7 days after the previous injection  - Epidural done for suspected radicular pain along dermatome of nerve   - Epidural done to differentiate level of radicular nerve root pain   - Repeat injections done only when pt reports 50% improvement in pain from previous injections    - patient is aware if they take any NSAIDs/anticoagulant prior to procedure procedure will be postponed, this was listed on the preop instructions and highlighted, list of NSAIDs/anticoagulant reviewed with patient to include methotrexate  - Injection done at L4/5 bilateral level(s) which is consistent with patient's dermatomal pain complaint    Monitor anesthesia request is medically indicated for the scheduled nerve block procedure due to:  1- needle phobia and anxiety, placing  the patient at risk during the provided service.  2-patient has an ASA class greater than 3 and requires constant presence of an anesthesiologist during the procedure,   3-patient has severe problems hard to lie still  4-patient suffers from chronic pain and is unable to function due to  diminished ADLs    Schedule L4/5 bilateral TFESI # 2, lumbar radiculopathy    Dr. Soto    Bring original prescription medication bottles/container/box with labels to each visit

## 2022-03-03 ENCOUNTER — HOSPITAL ENCOUNTER (OUTPATIENT)
Dept: RADIOLOGY | Facility: HOSPITAL | Age: 57
Discharge: HOME | End: 2022-03-03
Attending: PHYSICIAN ASSISTANT
Payer: MEDICARE

## 2022-03-03 ENCOUNTER — OFFICE VISIT (OUTPATIENT)
Dept: PAIN MEDICINE | Facility: CLINIC | Age: 57
End: 2022-03-03
Payer: MEDICARE

## 2022-03-03 VITALS
SYSTOLIC BLOOD PRESSURE: 213 MMHG | HEART RATE: 76 BPM | DIASTOLIC BLOOD PRESSURE: 98 MMHG | HEIGHT: 66 IN | WEIGHT: 193 LBS | BODY MASS INDEX: 31.02 KG/M2

## 2022-03-03 DIAGNOSIS — M83.9 VITAMIN D DEFICIENT OSTEOMALACIA: Chronic | ICD-10-CM

## 2022-03-03 DIAGNOSIS — G89.29 CHRONIC PAIN OF RIGHT KNEE: Chronic | ICD-10-CM

## 2022-03-03 DIAGNOSIS — M89.49 OSTEOARTHROSIS MULTIPLE SITES, NOT SPECIFIED AS GENERALIZED: Chronic | ICD-10-CM

## 2022-03-03 DIAGNOSIS — G89.29 CHRONIC PAIN OF LEFT KNEE: ICD-10-CM

## 2022-03-03 DIAGNOSIS — M25.562 CHRONIC PAIN OF LEFT KNEE: ICD-10-CM

## 2022-03-03 DIAGNOSIS — M25.561 CHRONIC PAIN OF RIGHT KNEE: Chronic | ICD-10-CM

## 2022-03-03 DIAGNOSIS — G89.29 CHRONIC PAIN OF LEFT KNEE: Chronic | ICD-10-CM

## 2022-03-03 DIAGNOSIS — M79.10 MYALGIA: Chronic | ICD-10-CM

## 2022-03-03 DIAGNOSIS — M25.562 CHRONIC PAIN OF LEFT KNEE: Chronic | ICD-10-CM

## 2022-03-03 DIAGNOSIS — M54.16 LUMBAR RADICULOPATHY, CHRONIC: Primary | Chronic | ICD-10-CM

## 2022-03-03 DIAGNOSIS — Z11.59 SCREENING FOR VIRAL DISEASE: ICD-10-CM

## 2022-03-03 DIAGNOSIS — R53.82 CHRONIC FATIGUE: Chronic | ICD-10-CM

## 2022-03-03 PROCEDURE — 3008F BODY MASS INDEX DOCD: CPT | Mod: CPTII,,, | Performed by: PHYSICIAN ASSISTANT

## 2022-03-03 PROCEDURE — 99214 PR OFFICE/OUTPT VISIT, EST, LEVL IV, 30-39 MIN: ICD-10-PCS | Mod: S$PBB,25,, | Performed by: PHYSICIAN ASSISTANT

## 2022-03-03 PROCEDURE — 73560 X-RAY EXAM OF KNEE 1 OR 2: CPT | Mod: TC,LT

## 2022-03-03 PROCEDURE — 73560 XR KNEE 1 OR 2 VIEW LEFT: ICD-10-PCS | Mod: 26,LT,, | Performed by: STUDENT IN AN ORGANIZED HEALTH CARE EDUCATION/TRAINING PROGRAM

## 2022-03-03 PROCEDURE — 96372 THER/PROPH/DIAG INJ SC/IM: CPT | Mod: PBBFAC | Performed by: PHYSICIAN ASSISTANT

## 2022-03-03 PROCEDURE — 4010F ACE/ARB THERAPY RXD/TAKEN: CPT | Mod: CPTII,,, | Performed by: PHYSICIAN ASSISTANT

## 2022-03-03 PROCEDURE — 3080F DIAST BP >= 90 MM HG: CPT | Mod: CPTII,,, | Performed by: PHYSICIAN ASSISTANT

## 2022-03-03 PROCEDURE — 3080F PR MOST RECENT DIASTOLIC BLOOD PRESSURE >= 90 MM HG: ICD-10-PCS | Mod: CPTII,,, | Performed by: PHYSICIAN ASSISTANT

## 2022-03-03 PROCEDURE — 99214 OFFICE O/P EST MOD 30 MIN: CPT | Mod: S$PBB,25,, | Performed by: PHYSICIAN ASSISTANT

## 2022-03-03 PROCEDURE — 3008F PR BODY MASS INDEX (BMI) DOCUMENTED: ICD-10-PCS | Mod: CPTII,,, | Performed by: PHYSICIAN ASSISTANT

## 2022-03-03 PROCEDURE — 73560 X-RAY EXAM OF KNEE 1 OR 2: CPT | Mod: 26,LT,, | Performed by: STUDENT IN AN ORGANIZED HEALTH CARE EDUCATION/TRAINING PROGRAM

## 2022-03-03 PROCEDURE — 1159F PR MEDICATION LIST DOCUMENTED IN MEDICAL RECORD: ICD-10-PCS | Mod: CPTII,,, | Performed by: PHYSICIAN ASSISTANT

## 2022-03-03 PROCEDURE — 99215 OFFICE O/P EST HI 40 MIN: CPT | Mod: PBBFAC,25 | Performed by: PHYSICIAN ASSISTANT

## 2022-03-03 PROCEDURE — 4010F PR ACE/ARB THEARPY RXD/TAKEN: ICD-10-PCS | Mod: CPTII,,, | Performed by: PHYSICIAN ASSISTANT

## 2022-03-03 PROCEDURE — 3077F SYST BP >= 140 MM HG: CPT | Mod: CPTII,,, | Performed by: PHYSICIAN ASSISTANT

## 2022-03-03 PROCEDURE — 3077F PR MOST RECENT SYSTOLIC BLOOD PRESSURE >= 140 MM HG: ICD-10-PCS | Mod: CPTII,,, | Performed by: PHYSICIAN ASSISTANT

## 2022-03-03 PROCEDURE — 1159F MED LIST DOCD IN RCRD: CPT | Mod: CPTII,,, | Performed by: PHYSICIAN ASSISTANT

## 2022-03-03 RX ORDER — OXYCODONE AND ACETAMINOPHEN 10; 325 MG/1; MG/1
1 TABLET ORAL EVERY 12 HOURS
Qty: 60 TABLET | Refills: 0 | Status: SHIPPED | OUTPATIENT
Start: 2022-03-05 | End: 2022-04-05 | Stop reason: SDUPTHER

## 2022-03-03 RX ORDER — KETOROLAC TROMETHAMINE 30 MG/ML
60 INJECTION, SOLUTION INTRAMUSCULAR; INTRAVENOUS
Status: COMPLETED | OUTPATIENT
Start: 2022-03-03 | End: 2022-03-03

## 2022-03-03 RX ORDER — GABAPENTIN 400 MG/1
400 CAPSULE ORAL EVERY 12 HOURS
Qty: 60 CAPSULE | Refills: 1 | Status: SHIPPED | OUTPATIENT
Start: 2022-03-03 | End: 2022-04-05 | Stop reason: SDUPTHER

## 2022-03-03 RX ADMIN — KETOROLAC TROMETHAMINE 60 MG: 30 INJECTION, SOLUTION INTRAMUSCULAR; INTRAVENOUS at 09:03

## 2022-03-03 NOTE — PATIENT INSTRUCTIONS
COVID SCREENING TO BE DONE 48-72 HOURS PRIOR TO PROCEDURE IF PT HAS NOT RECEIVED BOTH COVID VACCINATIONS OR HAS BEEN VACCINATED WITHIN THE LAST 2 WEEKS. VACCINATION CARD MUST BE PROVIDED IF PT HAS BEEN VACCINATED OR PT MUST HAVE COVID SCREENING IN ORDER TO HAVE PROCEDURE.  IF YOUR PROCEDURE IS ON A Tuesday, GET COVID TESTING ON THE Friday PRIOR TO PROCEDURE.  IF YOUR PROCEDURE IS ON A Thursday GET COVID TESTING ON THE Monday OR Tuesday PRIOR TO PROCEDURE.    IF YOUR PRIMARY CARE DOCTOR ORDERS YOUR COVID TESTING YOU MUST BRING YOUR RESULTS WITH YOU TO YOUR PROCEDURE.    Procedure Instructions:    Nothing to eat or drink for 8 hours or after midnight including gum, candy, mints, or tobacco products.  If you are scheduled for 1:30 or later nothing to eat or drink after 5 a.m. the morning of the procedure, including gum, candy, mints, or tobacco products.  Must have a  at least 18 yrs of age to stay present at all times  No Diabetic medications the morning of procedure, check blood sugar the morning of procedure, if it is greater than 200 call the office at [phone]  If you are started on antibiotics or have been prescribed antibiotics, have a fever, or have any other type of infection call to reschedule procedure.  If you take blood pressure medications you can take it at your regular scheduled time.        HOLD ASPIRIN AND ASPIRIN PRODUCTS  (ASPIRIN, BC POWDER ETC. ) FOR 7 DAYS  PRIOR TO PROCEDURE  HOLD NSAIDS( ibuprofen, mobic, meloxicam, advil, diclofenac, methotrexate, aleve etc....)  FOR 3 DAYS   PRIOR TO PROCEDURE

## 2022-03-22 ENCOUNTER — ANESTHESIA (OUTPATIENT)
Dept: PAIN MEDICINE | Facility: HOSPITAL | Age: 57
End: 2022-03-22
Payer: MEDICARE

## 2022-03-22 ENCOUNTER — ANESTHESIA EVENT (OUTPATIENT)
Dept: PAIN MEDICINE | Facility: HOSPITAL | Age: 57
End: 2022-03-22
Payer: MEDICARE

## 2022-03-22 ENCOUNTER — HOSPITAL ENCOUNTER (OUTPATIENT)
Facility: HOSPITAL | Age: 57
Discharge: HOME | End: 2022-03-22
Attending: PAIN MEDICINE | Admitting: PAIN MEDICINE
Payer: MEDICARE

## 2022-03-22 VITALS
HEIGHT: 66 IN | RESPIRATION RATE: 13 BRPM | WEIGHT: 195 LBS | BODY MASS INDEX: 31.34 KG/M2 | SYSTOLIC BLOOD PRESSURE: 155 MMHG | DIASTOLIC BLOOD PRESSURE: 92 MMHG | TEMPERATURE: 98 F | HEART RATE: 74 BPM | OXYGEN SATURATION: 93 %

## 2022-03-22 DIAGNOSIS — M54.17 LUMBOSACRAL RADICULOPATHY: ICD-10-CM

## 2022-03-22 LAB — GLUCOSE SERPL-MCNC: 120 MG/DL (ref 70–105)

## 2022-03-22 PROCEDURE — 27000284 HC CANNULA NASAL: Performed by: NURSE ANESTHETIST, CERTIFIED REGISTERED

## 2022-03-22 PROCEDURE — D9220A PRA ANESTHESIA: Mod: ,,, | Performed by: NURSE ANESTHETIST, CERTIFIED REGISTERED

## 2022-03-22 PROCEDURE — 37000008 HC ANESTHESIA 1ST 15 MINUTES: Performed by: PAIN MEDICINE

## 2022-03-22 PROCEDURE — 82962 GLUCOSE BLOOD TEST: CPT

## 2022-03-22 PROCEDURE — 37000009 HC ANESTHESIA EA ADD 15 MINS: Performed by: PAIN MEDICINE

## 2022-03-22 PROCEDURE — 27000716 HC OXISENSOR PROBE, ANY SIZE: Performed by: NURSE ANESTHETIST, CERTIFIED REGISTERED

## 2022-03-22 PROCEDURE — 27201423 OPTIME MED/SURG SUP & DEVICES STERILE SUPPLY: Performed by: PAIN MEDICINE

## 2022-03-22 PROCEDURE — 63600175 PHARM REV CODE 636 W HCPCS: Performed by: PAIN MEDICINE

## 2022-03-22 PROCEDURE — 63600175 PHARM REV CODE 636 W HCPCS: Performed by: NURSE ANESTHETIST, CERTIFIED REGISTERED

## 2022-03-22 PROCEDURE — 64483 PR EPIDURAL INJ, ANES/STEROID, TRANSFORAMINAL, LUMB/SACR, SNGL LEVL: ICD-10-PCS | Mod: 50,,, | Performed by: PAIN MEDICINE

## 2022-03-22 PROCEDURE — 25500020 PHARM REV CODE 255: Performed by: PAIN MEDICINE

## 2022-03-22 PROCEDURE — 64483 NJX AA&/STRD TFRM EPI L/S 1: CPT | Mod: 50,,, | Performed by: PAIN MEDICINE

## 2022-03-22 PROCEDURE — D9220A PRA ANESTHESIA: ICD-10-PCS | Mod: ,,, | Performed by: NURSE ANESTHETIST, CERTIFIED REGISTERED

## 2022-03-22 PROCEDURE — 25000003 PHARM REV CODE 250: Performed by: NURSE ANESTHETIST, CERTIFIED REGISTERED

## 2022-03-22 PROCEDURE — 64483 NJX AA&/STRD TFRM EPI L/S 1: CPT | Mod: 50 | Performed by: PAIN MEDICINE

## 2022-03-22 RX ORDER — PROPOFOL 10 MG/ML
VIAL (ML) INTRAVENOUS
Status: DISCONTINUED | OUTPATIENT
Start: 2022-03-22 | End: 2022-03-22

## 2022-03-22 RX ORDER — LIDOCAINE HYDROCHLORIDE 20 MG/ML
INJECTION, SOLUTION EPIDURAL; INFILTRATION; INTRACAUDAL; PERINEURAL
Status: DISCONTINUED | OUTPATIENT
Start: 2022-03-22 | End: 2022-03-22

## 2022-03-22 RX ORDER — SODIUM CHLORIDE 9 MG/ML
INJECTION, SOLUTION INTRAVENOUS CONTINUOUS
Status: DISCONTINUED | OUTPATIENT
Start: 2022-03-22 | End: 2022-03-22 | Stop reason: HOSPADM

## 2022-03-22 RX ORDER — IOPAMIDOL 612 MG/ML
INJECTION, SOLUTION INTRATHECAL
Status: DISCONTINUED | OUTPATIENT
Start: 2022-03-22 | End: 2022-03-22 | Stop reason: HOSPADM

## 2022-03-22 RX ORDER — TRIAMCINOLONE ACETONIDE 40 MG/ML
INJECTION, SUSPENSION INTRA-ARTICULAR; INTRAMUSCULAR
Status: DISCONTINUED | OUTPATIENT
Start: 2022-03-22 | End: 2022-03-22 | Stop reason: HOSPADM

## 2022-03-22 RX ADMIN — PROPOFOL 50 MG: 10 INJECTION, EMULSION INTRAVENOUS at 10:03

## 2022-03-22 RX ADMIN — PROPOFOL 50 MG: 10 INJECTION, EMULSION INTRAVENOUS at 11:03

## 2022-03-22 RX ADMIN — LIDOCAINE HYDROCHLORIDE 40 MG: 20 INJECTION, SOLUTION EPIDURAL; INFILTRATION; INTRACAUDAL; PERINEURAL at 10:03

## 2022-03-22 RX ADMIN — SODIUM CHLORIDE: 9 INJECTION, SOLUTION INTRAVENOUS at 10:03

## 2022-03-22 NOTE — DISCHARGE INSTRUCTIONS
No driving, operating machinery, making legal decisions, or signing legal documents until tomorrow.   Continue diet as tolerated. Drink plenty of fluids and rest.   If unable to void in 8 hours proceed to nearest ER.   Notify MD of redness or drainage from incision site as well as any fever over the next 3-4 days.  No lifting over 5 lbs for the next 24 hrs.   Continue medications as prescribed. May take pain medication as prescribed.   May shower tomorrow. No tub baths for 48 hours following procedure.   May remove bandaids tomorrow, if they fall off before tomorrow they do not have to be replaced.     Neurology

## 2022-03-22 NOTE — ANESTHESIA POSTPROCEDURE EVALUATION
Anesthesia Post Evaluation    Patient: Audrey Causey    Procedure(s) Performed: Procedure(s) (LRB):  Injection,steroid,epidural,transforaminal approach, L4/5 (Bilateral)    Final Anesthesia Type: general      Patient location during evaluation: PACU  Patient participation: Yes- Able to Participate  Level of consciousness: awake and alert  Post-procedure vital signs: reviewed and stable  Pain management: adequate  Airway patency: patent  LEE ANN mitigation strategies: Multimodal analgesia  PONV status at discharge: No PONV  Anesthetic complications: no      Cardiovascular status: blood pressure returned to baseline  Respiratory status: spontaneous ventilation and room air  Hydration status: euvolemic  Follow-up not needed.          Vitals Value Taken Time   /92 03/22/22 1140   Temp 36.6 °C (97.9 °F) 03/22/22 1108   Pulse 74 03/22/22 1140   Resp 13 03/22/22 1140   SpO2 93 % 03/22/22 1140         Event Time   Out of Recovery 11:40:00         Pain/Mónica Score: Mónica Score: 10 (3/22/2022 11:40 AM)

## 2022-03-22 NOTE — DISCHARGE SUMMARY
[facility] - Pain Management  Discharge Note  Short Stay    Procedure(s) (LRB):  Injection,steroid,epidural,transforaminal approach, L4/5 (Bilateral)    OUTCOME: Patient tolerated treatment/procedure well without complication and is now ready for discharge.    DISPOSITION: Home or Self Care    FINAL DIAGNOSIS:  Lumbar radiculopathy    FOLLOWUP: In clinic    DISCHARGE INSTRUCTIONS:  See nurse's notes     TIME SPENT ON DISCHARGE: 5 minutes

## 2022-03-22 NOTE — TRANSFER OF CARE
"Anesthesia Transfer of Care Note    Patient: Audrey Causey    Procedure(s) Performed: Procedure(s) (LRB):  Injection,steroid,epidural,transforaminal approach, L4/5 (Bilateral)    Patient location: PACU    Anesthesia Type: general    Transport from OR: Transported from OR on room air with adequate spontaneous ventilation    Post pain: adequate analgesia    Post assessment: no apparent anesthetic complications    Post vital signs: stable    Level of consciousness: awake and alert    Nausea/Vomiting: no nausea/vomiting    Complications: none    Transfer of care protocol was followed      Last vitals:   Visit Vitals  BP (!) 144/92   Pulse 86   Temp 36.6 °C (97.9 °F) (Oral)   Resp 15   Ht 5' 6" (1.676 m)   Wt 88.5 kg (195 lb)   SpO2 (!) 94%   Breastfeeding No   BMI 31.47 kg/m²     "

## 2022-03-22 NOTE — BRIEF OP NOTE
Discharge Note  Short Stay    Admit Date: 3/22/2022    Discharge Date: 3/22/2022    Attending Physician: Yari Soto     Discharge Provider: Yari Soto    Diagnoses: Lumbar Radiculopathy    Discharged Condition: Good    Final Diagnoses: Lumbar radiculopathy, chronic [M54.16]    Disposition: Home or Self Care    Hospital Course: No complications, uneventful    Outcome of Hospitalization, Treatment, Procedure, or Surgery:  Patient was admitted for outpatient interventional pain management procedure. The patient tolerated the procedure well with no complications.    Follow up/Patient Instructions:  Follow up as scheduled in Pain Management office in 3-4 weeks.  Patient has received instructions and follow up date and time.    Medications:  Continue previous medications

## 2022-03-22 NOTE — OP NOTE
Procedure Note    Procedure Date: 3/22/2022    Procedure Performed: Bilateral  Transforaminal Epidural @ L4-5 with Fluoroscopic Guidance    Indications: Patient has failed conservative therapy.      Pre-op diagnosis: Lumbar Radiculopathy    Post-op diagnosis: same    Physician: Yari Soto MD    Anesthesia: MAC    Medications injected:  kenalog 40mg, sterile preservative-free normal saline.    IVF: Per Anesthesia    Estimated Blood Loss: Less than 1cc    Complications: None    Technique:  The patient was interviewed in the holding area and Risks/Benefits were discussed, including, but not limited to the possibility of new or different pain, bleeding or infection.   All questions were answered.  The patient understood and accepted risks.  Consent was signed.  A time out was taken to identify the patient, procedure and side of the procedure. The patient was placed in a prone position, then prepped and draped in the usual sterile fashion using ChloraPrep and sterile towels.  The Bilateral L4-5 neural foramen were identified under fluoroscopic guidance in AP and oblique view.  Local anesthetic was given by raising a skin wheal with a 25-gauge 1.5 inch needle.  In the oblique view, a 3.5 inch 22-gauge  touhy needle was introduced into the foramen @ bilateral L4-5 and positioned in the posterior superior quarter of the foramen.  .5cc of Isovue M-300  contrast was injected live in an AP fluoroscopic view at each level demonstrating appropriate needle position with contrast spread outlining the respective  Bilateral L4  nerve roots and also medially into the epidural space without intravascular or intrathecal spread.  After negative aspiration 1cc from a   1:1 mixture of  (40 mg Kenalog and  1 mL sterile  preservative-free normal saline)  was injected slowly and incrementally.  The needles were removed.  The patient tolerated the procedure well.  The patient was monitored after the procedure.  Patient was given post  procedure and discharge instructions to follow at home. The patient was discharged in a stable condition and accompanied by an adult .

## 2022-03-22 NOTE — ANESTHESIA PREPROCEDURE EVALUATION
03/22/2022  Audrey Causey is a 56 y.o., female.      Pre-op Assessment    I have reviewed the Patient Summary Reports.     I have reviewed the Nursing Notes. I have reviewed the NPO Status.   I have reviewed the Medications.     Review of Systems  Anesthesia Hx:  History of prior surgery of interest to airway management or planning: Denies Family Hx of Anesthesia complications.   Denies Personal Hx of Anesthesia complications.   Cardiovascular:   Hypertension    Pulmonary:   Sleep Apnea, CPAP    Hepatic/GI:   GERD    Musculoskeletal:   Arthritis     Neurological:   Neuromuscular Disease, Seizures    Endocrine:   Diabetes Esophageal dysphagia Obesity / BMI > 30      Physical Exam  General: Well nourished    Airway:  Mallampati: II / II  Mouth Opening: Normal  TM Distance: Normal  Tongue: Normal  Neck ROM: Normal ROM        Anesthesia Plan  Type of Anesthesia, risks & benefits discussed:    Anesthesia Type: Gen Natural Airway  Intra-op Monitoring Plan: Standard ASA Monitors  Post Op Pain Control Plan: multimodal analgesia  Induction:  IV  Informed Consent: Informed consent signed with the Patient and all parties understand the risks and agree with anesthesia plan.  All questions answered. Patient consented to blood products? Yes  ASA Score: 3  Day of Surgery Review of History & Physical: I have interviewed and examined the patient. I have reviewed the patient's H&P dated:     Ready For Surgery From Anesthesia Perspective.     Past Medical History:   Diagnosis Date    Acute superficial gastritis without hemorrhage 6/30/2021    Diabetes mellitus     Diverticula, colon 9/20/2021    Esophageal dysphagia 6/30/2021    GERD (gastroesophageal reflux disease)     Hypertension     Low back pain     Lumbar radiculopathy     Seizures     Sleep apnea        Past Surgical History:   Procedure Laterality Date      SECTION      COLONOSCOPY  2018    Dr. Naranjo    EPIDURAL STEROID INJECTION  2018    L4-5 MARCOS X 5 - Cook    EPIDURAL STEROID INJECTION  2017    L5-S1 MARCOS - Cook    EPIDURAL STEROID INJECTION N/A 2022    Procedure: Injection, Steroid, Epidural, L4/5;  Surgeon: Yari Soto MD;  Location: [facility];  Service: Pain Management;  Laterality: N/A;  MESSAGE LEFT ON VM TO BE TESTED ON 1-14    ESOPHAGOGASTRODUODENOSCOPY  2018    EYE SURGERY      HYSTERECTOMY      INJECTION OF FACET JOINT Bilateral 2017    Bilateral L3-S1 Facet Injection - Cook    LAPAROSCOPIC CHOLECYSTECTOMY N/A 2021    Procedure: CHOLECYSTECTOMY, LAPAROSCOPIC;  Surgeon: Amy Wagner MD;  Location: [facility] OR;  Service: General;  Laterality: N/A;  fully vaccinated       Family History   Problem Relation Age of Onset    Hypertension Mother     Breast cancer Mother     Thyroid cancer Mother     Diabetes type II Mother     Stroke Mother     Heart attack Mother        Social History     Socioeconomic History    Marital status:    Tobacco Use    Smoking status: Never Smoker    Smokeless tobacco: Never Used   Substance and Sexual Activity    Alcohol use: Never    Drug use: Never       No current facility-administered medications for this encounter.     No current facility-administered medications on file as of 3/22/2022.     Outpatient Medications as of 3/22/2022   Medication Sig Dispense Refill    carvediloL (COREG) 12.5 MG tablet Take 1 tablet by mouth once daily.      hydrALAZINE (APRESOLINE) 100 MG tablet Take 1 tablet by mouth 3 (three) times daily.      lisinopriL-hydrochlorothiazide (PRINZIDE,ZESTORETIC) 20-25 mg Tab Take 1 tablet by mouth once daily.      metFORMIN (GLUCOPHAGE) 1000 MG tablet Take 1 tablet by mouth 2 (two) times a day.      omeprazole (PRILOSEC) 40 MG capsule Take 1 capsule by mouth 2 (two) times a day.      potassium chloride SA  (K-DUR,KLOR-CON) 20 MEQ tablet Take 1 tablet by mouth 2 (two) times a day.      aspirin (ECOTRIN) 81 MG EC tablet Take 81 mg by mouth once daily.      busPIRone (BUSPAR) 15 MG tablet Take 1 tablet by mouth once daily.      citalopram (CELEXA) 20 MG tablet Take 1 tablet by mouth once daily.      dorzolamide-timolol 2-0.5% (COSOPT) 22.3-6.8 mg/mL ophthalmic solution Place 1 drop into both eyes 2 (two) times a day.      estradioL (ESTRACE) 2 MG tablet Take 1 tablet by mouth once daily.      metoclopramide HCl (REGLAN) 10 MG tablet Take 1 tablet by mouth 4 (four) times daily before meals and nightly.      metroNIDAZOLE (FLAGYL) 500 MG tablet Take 1 tablet by mouth 2 (two) times a day.      QUEtiapine (SEROQUEL) 50 MG tablet Take 1 tablet by mouth every evening.      RHOPRESSA 0.02 % ophthalmic solution Place 1 drop into both eyes once daily.      travoprost (TRAVATAN Z) 0.004 % ophthalmic solution Place 1 drop into both eyes once daily.       Review of patient's allergies indicates:  No Known Allergies

## 2022-03-22 NOTE — PLAN OF CARE
Plan:  D/c pt via wheelchair at 1150  Informed pt if does not void in 8 hours to go to ER. Notify if redness, drainage, from injection site or fever over next 3-4 days. Rest and drink plenty of fluids for the remainder of the day. No lifting over 5 lbs. For the remainder of the day. Continue regular medications as prescribed. May take pain medications as prescribed.     Pain improved 100%

## 2022-04-05 NOTE — PROGRESS NOTES
Disclaimer:  This note has been generated using voice recognition software.  There may be type of graft focal areas that have been missed during a proof reading      Subjective:      Patient ID: Audrey Causey is a 56 y.o. female.    Chief Complaint: Low-back Pain and Leg Pain      Pain  This is a chronic problem. The current episode started more than 1 year ago. The problem occurs daily. The problem has been gradually improving. Associated symptoms include arthralgias and neck pain. Pertinent negatives include no change in bowel habit, chest pain, chills, coughing, diaphoresis, fatigue, fever, rash, sore throat, vertigo or vomiting.     Review of Systems   Constitutional: Negative for activity change, appetite change, chills, diaphoresis, fatigue and fever.   HENT: Negative for drooling, ear discharge, ear pain, facial swelling, nosebleeds, sore throat, trouble swallowing, voice change and goiter.    Eyes: Negative for photophobia, pain, discharge, redness and visual disturbance.   Respiratory: Negative for apnea, cough, choking, chest tightness, shortness of breath, wheezing and stridor.    Cardiovascular: Negative for chest pain, palpitations and leg swelling.   Gastrointestinal: Negative for abdominal distention, change in bowel habit, diarrhea, rectal pain, vomiting, fecal incontinence and change in bowel habit.   Endocrine: Negative for cold intolerance, heat intolerance, polydipsia, polyphagia and polyuria.   Genitourinary: Negative for bladder incontinence, dysuria, flank pain, frequency and hot flashes.   Musculoskeletal: Positive for arthralgias, back pain, leg pain, neck pain and neck stiffness.   Integumentary:  Negative for color change, pallor and rash.   Allergic/Immunologic: Negative for immunocompromised state.   Neurological: Negative for dizziness, vertigo, seizures, syncope, facial asymmetry, speech difficulty, light-headedness, disturbances in coordination, memory loss and coordination  "difficulties.   Hematological: Negative for adenopathy. Does not bruise/bleed easily.   Psychiatric/Behavioral: Negative for agitation, behavioral problems, confusion, decreased concentration, dysphoric mood, hallucinations, self-injury and suicidal ideas. The patient is not nervous/anxious and is not hyperactive.             Objective:  Vitals:    04/06/22 0914 04/06/22 0915   BP: (!) 174/94    Pulse: 69    Weight: 88.5 kg (195 lb)    Height: 5' 6" (1.676 m)    PainSc:   7   7         Physical Exam  Vitals and nursing note reviewed. Exam conducted with a chaperone present.   Constitutional:       General: She is awake. She is not in acute distress.     Appearance: Normal appearance. She is not ill-appearing or diaphoretic.   HENT:      Head: Normocephalic and atraumatic.      Nose: Nose normal.      Mouth/Throat:      Mouth: Mucous membranes are moist.      Pharynx: Oropharynx is clear.   Eyes:      Conjunctiva/sclera: Conjunctivae normal.      Pupils: Pupils are equal, round, and reactive to light.   Cardiovascular:      Rate and Rhythm: Normal rate.   Pulmonary:      Effort: Pulmonary effort is normal. No respiratory distress.   Abdominal:      Palpations: Abdomen is soft.      Tenderness: There is no guarding.   Musculoskeletal:         General: Normal range of motion.      Cervical back: Normal range of motion and neck supple. Tenderness present. No rigidity.      Thoracic back: Tenderness present.      Lumbar back: Tenderness present.      Right knee: Tenderness present.      Left knee: Tenderness present.   Skin:     General: Skin is warm and dry.      Coloration: Skin is not jaundiced or pale.   Neurological:      General: No focal deficit present.      Mental Status: She is alert and oriented to person, place, and time. Mental status is at baseline.      Cranial Nerves: Cranial nerves are intact. No cranial nerve deficit (II-XII).   Psychiatric:         Mood and Affect: Mood normal.         Behavior: " Behavior normal. Behavior is cooperative.         Thought Content: Thought content normal.           FL Fluoro for Pain Management  See OP Notes for results.     IMPRESSION: See OP Notes for results.     This procedure was auto-finalized by: Virtual Radiologist       Admission on 03/22/2022, Discharged on 03/22/2022   Component Date Value Ref Range Status    POC Glucose 03/22/2022 120 (A) 70 - 105 mg/dL Final   Lab Visit on 03/18/2022   Component Date Value Ref Range Status    SARS CoV-2 PCR 03/18/2022 Negative  Negative, Invalid Final    Internal Control 03/18/2022 Valid   Final   Lab Visit on 03/03/2022   Component Date Value Ref Range Status    WBC 03/03/2022 6.30  4.50 - 11.00 K/uL Final    RBC 03/03/2022 4.51  4.20 - 5.40 M/uL Final    Hemoglobin 03/03/2022 13.0  12.0 - 16.0 g/dL Final    Hematocrit 03/03/2022 39.5  38.0 - 47.0 % Final    MCV 03/03/2022 87.6  80.0 - 96.0 fL Final    MCH 03/03/2022 28.8  27.0 - 31.0 pg Final    MCHC 03/03/2022 32.9  32.0 - 36.0 g/dL Final    RDW 03/03/2022 13.2  11.5 - 14.5 % Final    Platelet Count 03/03/2022 371  150 - 400 K/uL Final    MPV 03/03/2022 10.0  9.4 - 12.4 fL Final    Neutrophils % 03/03/2022 52.4 (A) 53.0 - 65.0 % Final    Lymphocytes % 03/03/2022 41.3 (A) 27.0 - 41.0 % Final    Monocytes % 03/03/2022 4.4  2.0 - 6.0 % Final    Eosinophils % 03/03/2022 1.4  1.0 - 4.0 % Final    Basophils % 03/03/2022 0.3  0.0 - 1.0 % Final    Immature Granulocytes % 03/03/2022 0.2  0.0 - 0.4 % Final    nRBC, Auto 03/03/2022 0.0  <=0.0 % Final    Neutrophils, Abs 03/03/2022 3.30  1.80 - 7.70 K/uL Final    Lymphocytes, Absolute 03/03/2022 2.60  1.00 - 4.80 K/uL Final    Monocytes, Absolute 03/03/2022 0.28  0.00 - 0.80 K/uL Final    Eosinophils, Absolute 03/03/2022 0.09  0.00 - 0.50 K/uL Final    Basophils, Absolute 03/03/2022 0.02  0.00 - 0.20 K/uL Final    Immature Granulocytes, Absolute 03/03/2022 0.01  0.00 - 0.04 K/uL Final    nRBC, Absolute 03/03/2022  0.00  <=0.00 x10e3/uL Final    Diff Type 03/03/2022 Auto   Final    Sodium 03/03/2022 139  136 - 145 mmol/L Final    Potassium 03/03/2022 3.2 (A) 3.5 - 5.1 mmol/L Final    Chloride 03/03/2022 103  98 - 107 mmol/L Final    CO2 03/03/2022 33 (A) 21 - 32 mmol/L Final    Anion Gap 03/03/2022 6 (A) 7 - 16 mmol/L Final    Glucose 03/03/2022 121 (A) 74 - 106 mg/dL Final    BUN 03/03/2022 12  7 - 18 mg/dL Final    Creatinine 03/03/2022 0.64  0.55 - 1.02 mg/dL Final    BUN/Creatinine Ratio 03/03/2022 19  6 - 20 Final    Calcium 03/03/2022 9.4  8.5 - 10.1 mg/dL Final    Total Protein 03/03/2022 8.1  6.4 - 8.2 g/dL Final    Albumin 03/03/2022 3.7  3.5 - 5.0 g/dL Final    Globulin 03/03/2022 4.4 (A) 2.0 - 4.0 g/dL Final    A/G Ratio 03/03/2022 0.8   Final    Bilirubin, Total 03/03/2022 0.2  0.0 - 1.2 mg/dL Final    Alk Phos 03/03/2022 108  46 - 118 U/L Final    ALT 03/03/2022 37  13 - 56 U/L Final    AST 03/03/2022 15  15 - 37 U/L Final    eGFR 03/03/2022 102  >=60 mL/min/1.73m² Final    RA Titer 03/03/2022 <10  0 - 15 IU/mL Final    HLA-B27 Result 03/03/2022 Negative  Not Applicable Final    Interpretation 03/03/2022 SEE COMMENTS   Final    CRP 03/03/2022 2.09 (A) 0.00 - 0.80 mg/dL Final    ESR Westergren 03/03/2022 27  0 - 30 mm/Hr Final    Syphilis Ab Interpretation 03/03/2022 Non-Reactive  Non-Reactive Final    Free T4 03/03/2022 1.09  0.76 - 1.46 ng/dL Final    TSH 03/03/2022 3.740  0.358 - 3.740 uIU/mL Final    Lupus Anticoagulant Tech Interp 03/03/2022 SEE COMMENTS   Final    Prothrombin Time (PT), P 03/03/2022 9.9  9.4 - 12.5 sec Final    INR 03/03/2022 0.9  0.9 - 1.1 Final    Activated Partial Thrombopl Time, P 03/03/2022 33  25 - 37 sec Final    DRVVT Screen Ratio 03/03/2022 0.77  <1.20 ratio Final    Vitamin D 25-Hydroxy, Blood 03/03/2022 27.8  ng/mL Final    HIV-1 RNA Detect/Quant, P 03/03/2022 Undetected  Undetected copies/mL Final    Hepatitis C Ab 03/03/2022 Non-Reactive   Non-Reactive Final    Uric Acid 03/03/2022 3.8  2.6 - 6.0 mg/dL Final    Antistreptolysin O (ASO) 03/03/2022 17.0  0.0 - 408.0 IU/mL Final    Anti-dsDNA 03/03/2022 Negative  Negative Final    Anti-DSDNA (IU) 03/03/2022 2  0 - 24 IU Final    C3 03/03/2022 167  90 - 180 mg/dL Final    C4 03/03/2022 33  10 - 40 mg/dL Final    Complement, Total 03/03/2022 68  30 - 75 U/mL Final    Anaplasma phagocytophilum Ab, IgG,S 03/03/2022 <1:64  <1:64 titer Final    Ehrlichia Chaffeensis (HME) Ab, IgG 03/03/2022 <1:64  <1:64 titer Final    Spotted Fever Group Ab, IgG, S 03/03/2022 <1:64  <1:64 Final    Spotted Fever Group Ab, IgM, S 03/03/2022 <1:64  <1:64 Final    Lyme IgG/IgM 03/03/2022 Non-Reactive  Non-Reactive Final    CK 03/03/2022 52  26 - 192 U/L Final   Admission on 01/18/2022, Discharged on 01/18/2022   Component Date Value Ref Range Status    POC Glucose 01/18/2022 100  70 - 110 MG/DL Final    POC Glucose 01/18/2022 100  70 - 105 mg/dL Final   Lab Visit on 01/14/2022   Component Date Value Ref Range Status    COVID-19 Ag 01/14/2022 Negative  Negative, Invalid Final   Office Visit on 01/05/2022   Component Date Value Ref Range Status    pH, UA 01/05/2022 5.5  5.0, 5.5, 6.0, 6.5, 7.0, 7.5, 8.0 pH Units Final    Specific Gravity, UA 01/05/2022 1.020  <=1.005, 1.010, 1.015, 1.020, 1.025, 1.030 Final    Creatinine, Urine 01/05/2022 65  28 - 219 mg/dL Final    6-Acetylmorphine 01/05/2022 Negative  10 ng/mL Final    7-Aminoclonazepam 01/05/2022 Negative  Negative 25 ng/mL Final    a-Hydroxyalprazolam 01/05/2022 Negative  25 ng/mL Final    Acetyl Fentanyl 01/05/2022 Negative  2.5 ng/mL Final    Acetyl Norfentanyl Oxalate 01/05/2022 Negative  5 ng/mL Final    Benzoylecgonine 01/05/2022 Negative  100 ng/mL Final    Buprenorphine 01/05/2022 Negative  25 ng/mL Final    Codeine 01/05/2022 Negative  25 ng/mL Final    EDDP 01/05/2022 Negative  25 ng/mL Final    Fentanyl 01/05/2022 Negative  2.5 ng/mL Final     Hydrocodone 01/05/2022 Negative  25 ng/mL Final    Hydromorphone 01/05/2022 Negative  25 ng/mL Final    Lorazepam 01/05/2022 Negative  25 ng/mL Final    Morphine 01/05/2022 Negative  25 ng/mL Final    Norbuprenorphine 01/05/2022 Negative  25 ng/mL Final    Nordiazepam 01/05/2022 Negative  25 ng/mL Final    Norfentanyl Oxalate 01/05/2022 Negative  5 ng/mL Final    Norhydrocodone 01/05/2022 Negative  50 ng/mL Final    Noroxycodone HCL 01/05/2022 Negative  50 ng/mL Final    Oxazepam 01/05/2022 Negative  25 ng/mL Final    Oxymorphone 01/05/2022 Negative  25 ng/mL Final    Tapentadol 01/05/2022 Negative  25 ng/mL Final    Temazepam 01/05/2022 Negative  25 ng/mL Final    THC-COOH 01/05/2022 Negative  25 ng/mL Final    Tramadol 01/05/2022 Negative  100 ng/mL Final    Amphetamine, Urine 01/05/2022 Negative  Negative 100 ng/mL Final    Methamphetamines, Urine 01/05/2022 Negative  Negative 100 ng/mL Final    Methadone, Urine 01/05/2022 Negative  Negative 25 ng/mL Final    Oxycodone, Urine 01/05/2022 Negative  Negative 25 ng/mL Final   Office Visit on 12/20/2021   Component Date Value Ref Range Status    POC Amphetamines 12/20/2021 Negative  Negative, Inconclusive Final    POC Barbiturates 12/20/2021 Negative  Negative, Inconclusive Final    POC Benzodiazepines 12/20/2021 Negative  Negative, Inconclusive Final    POC Cocaine 12/20/2021 Negative  Negative, Inconclusive Final    POC THC 12/20/2021 Negative  Negative, Inconclusive Final    POC Methadone 12/20/2021 Negative  Negative, Inconclusive Final    POC Methamphetamine 12/20/2021 Negative  Negative, Inconclusive Final    POC Opiates 12/20/2021 Negative  Negative, Inconclusive Final    POC Oxycodone 12/20/2021 Negative  Negative, Inconclusive Final    POC Phencyclidine 12/20/2021 Negative  Negative, Inconclusive Final    POC Methylenedioxymethamphetamine * 12/20/2021 Negative  Negative, Inconclusive Final    POC Tricyclic Antidepressants  12/20/2021 Negative  Negative, Inconclusive Final    POC Buprenorphine 12/20/2021 Negative   Final     Acceptable 12/20/2021 Yes   Final    POC Temperature (Urine) 12/20/2021 94   Final           Assessment:      1. Lumbar radiculopathy, chronic    2. Chronic pain of right knee    3. Osteoarthrosis multiple sites, not specified as generalized    4. Other long term (current) drug therapy          Orders Placed This Encounter   Procedures    POCT Urine Drug Screen Presump     Interpretive Information:     Negative:  No drug detected at the cut off level.   Positive:  This result represents presumptive positive for the   tested drug, other substances may yield a positive response other   than the analyte of interest. This result should be utilized for   diagnostic purpose only. Confirmation testing will be performed upon physician request only.            A's of Opioid Risk Assessment  Activity:Patient can perform ADL.   Analgesia:Patients pain is partially controlled by current medication. Patient has tried OTC medications such as Tylenol and Ibuprofen with out relief.   Adverse Effects: Patient denies constipation or sedation.  Aberrant Behavior:  reviewed with no aberrant drug seeking/taking behavior.  Overdose reversal drug naloxone discussed    Drug screen reviewed      Requested Prescriptions     Signed Prescriptions Disp Refills    gabapentin (NEURONTIN) 400 MG capsule 60 capsule 1     Sig: Take 1 capsule (400 mg total) by mouth every 12 (twelve) hours.    oxyCODONE-acetaminophen (PERCOCET)  mg per tablet 60 tablet 0     Sig: Take 1 tablet by mouth every 12 (twelve) hours.    oxyCODONE-acetaminophen (PERCOCET)  mg per tablet 60 tablet 0     Sig: Take 1 tablet by mouth every 12 (twelve) hours.         Plan:    Follow-up after bilateral lumbar L4/5 TFESI # 2 March 22, 2022  She states she had 80% relief after procedure in is ongoing, she states that procedure did help increase  her level of function    Discussed labs drawn last visit vitamin-D was 27.8 she states she has vitamin-D replacement medications home she will start taking this    CRP was 2.09    After discussing options she would like to continue conservative management redraw labs at next visit to evaluate CRP    Considering rheumatology referral if needed    She would like to continue with conservative management    Continue home exercise program as directed    Continue current medication    Follow-up 2 months    Dr. Soto, March 2023    Bring original prescription medication bottles/container/box with labels to each visit

## 2022-04-06 ENCOUNTER — OFFICE VISIT (OUTPATIENT)
Dept: PAIN MEDICINE | Facility: CLINIC | Age: 57
End: 2022-04-06
Payer: MEDICARE

## 2022-04-06 VITALS
HEIGHT: 66 IN | WEIGHT: 195 LBS | BODY MASS INDEX: 31.34 KG/M2 | SYSTOLIC BLOOD PRESSURE: 174 MMHG | HEART RATE: 69 BPM | DIASTOLIC BLOOD PRESSURE: 94 MMHG

## 2022-04-06 DIAGNOSIS — M89.49 OSTEOARTHROSIS MULTIPLE SITES, NOT SPECIFIED AS GENERALIZED: Chronic | ICD-10-CM

## 2022-04-06 DIAGNOSIS — M25.561 CHRONIC PAIN OF RIGHT KNEE: Chronic | ICD-10-CM

## 2022-04-06 DIAGNOSIS — Z79.899 OTHER LONG TERM (CURRENT) DRUG THERAPY: ICD-10-CM

## 2022-04-06 DIAGNOSIS — G89.29 CHRONIC PAIN OF RIGHT KNEE: Chronic | ICD-10-CM

## 2022-04-06 DIAGNOSIS — M54.16 LUMBAR RADICULOPATHY, CHRONIC: Primary | Chronic | ICD-10-CM

## 2022-04-06 LAB
CTP QC/QA: YES
POC (AMP) AMPHETAMINE: NEGATIVE
POC (BAR) BARBITURATES: NEGATIVE
POC (BUP) BUPRENORPHINE: NEGATIVE
POC (BZO) BENZODIAZEPINES: NEGATIVE
POC (COC) COCAINE: NEGATIVE
POC (MDMA) METHYLENEDIOXYMETHAMPHETAMINE 3,4: NEGATIVE
POC (MET) METHAMPHETAMINE: NEGATIVE
POC (MOP) OPIATES: NEGATIVE
POC (MTD) METHADONE: NEGATIVE
POC (OXY) OXYCODONE: ABNORMAL
POC (PCP) PHENCYCLIDINE: NEGATIVE
POC (TCA) TRICYCLIC ANTIDEPRESSANTS: NEGATIVE
POC TEMPERATURE (URINE): 92
POC THC: NEGATIVE

## 2022-04-06 PROCEDURE — 99214 PR OFFICE/OUTPT VISIT, EST, LEVL IV, 30-39 MIN: ICD-10-PCS | Mod: S$PBB,,, | Performed by: PHYSICIAN ASSISTANT

## 2022-04-06 PROCEDURE — 1159F PR MEDICATION LIST DOCUMENTED IN MEDICAL RECORD: ICD-10-PCS | Mod: CPTII,,, | Performed by: PHYSICIAN ASSISTANT

## 2022-04-06 PROCEDURE — 3008F PR BODY MASS INDEX (BMI) DOCUMENTED: ICD-10-PCS | Mod: CPTII,,, | Performed by: PHYSICIAN ASSISTANT

## 2022-04-06 PROCEDURE — 3008F BODY MASS INDEX DOCD: CPT | Mod: CPTII,,, | Performed by: PHYSICIAN ASSISTANT

## 2022-04-06 PROCEDURE — 3080F DIAST BP >= 90 MM HG: CPT | Mod: CPTII,,, | Performed by: PHYSICIAN ASSISTANT

## 2022-04-06 PROCEDURE — 4010F PR ACE/ARB THEARPY RXD/TAKEN: ICD-10-PCS | Mod: CPTII,,, | Performed by: PHYSICIAN ASSISTANT

## 2022-04-06 PROCEDURE — 3080F PR MOST RECENT DIASTOLIC BLOOD PRESSURE >= 90 MM HG: ICD-10-PCS | Mod: CPTII,,, | Performed by: PHYSICIAN ASSISTANT

## 2022-04-06 PROCEDURE — 1159F MED LIST DOCD IN RCRD: CPT | Mod: CPTII,,, | Performed by: PHYSICIAN ASSISTANT

## 2022-04-06 PROCEDURE — 99214 OFFICE O/P EST MOD 30 MIN: CPT | Mod: S$PBB,,, | Performed by: PHYSICIAN ASSISTANT

## 2022-04-06 PROCEDURE — 3077F PR MOST RECENT SYSTOLIC BLOOD PRESSURE >= 140 MM HG: ICD-10-PCS | Mod: CPTII,,, | Performed by: PHYSICIAN ASSISTANT

## 2022-04-06 PROCEDURE — 99214 OFFICE O/P EST MOD 30 MIN: CPT | Mod: PBBFAC | Performed by: PHYSICIAN ASSISTANT

## 2022-04-06 PROCEDURE — 3077F SYST BP >= 140 MM HG: CPT | Mod: CPTII,,, | Performed by: PHYSICIAN ASSISTANT

## 2022-04-06 PROCEDURE — 4010F ACE/ARB THERAPY RXD/TAKEN: CPT | Mod: CPTII,,, | Performed by: PHYSICIAN ASSISTANT

## 2022-04-06 PROCEDURE — 80305 DRUG TEST PRSMV DIR OPT OBS: CPT | Mod: PBBFAC | Performed by: PHYSICIAN ASSISTANT

## 2022-04-06 RX ORDER — OXYCODONE AND ACETAMINOPHEN 10; 325 MG/1; MG/1
1 TABLET ORAL EVERY 12 HOURS
Qty: 60 TABLET | Refills: 0 | Status: SHIPPED | OUTPATIENT
Start: 2022-05-06 | End: 2022-05-31 | Stop reason: SDUPTHER

## 2022-04-06 RX ORDER — GABAPENTIN 400 MG/1
400 CAPSULE ORAL EVERY 12 HOURS
Qty: 60 CAPSULE | Refills: 1 | Status: SHIPPED | OUTPATIENT
Start: 2022-04-06 | End: 2022-05-31 | Stop reason: SDUPTHER

## 2022-04-06 RX ORDER — OXYCODONE AND ACETAMINOPHEN 10; 325 MG/1; MG/1
1 TABLET ORAL EVERY 12 HOURS
Qty: 60 TABLET | Refills: 0 | Status: SHIPPED | OUTPATIENT
Start: 2022-04-06 | End: 2022-05-06

## 2022-05-24 ENCOUNTER — HOSPITAL ENCOUNTER (OUTPATIENT)
Dept: RADIOLOGY | Facility: HOSPITAL | Age: 57
Discharge: HOME | End: 2022-05-24
Payer: MEDICARE

## 2022-05-24 VITALS — HEIGHT: 66 IN | WEIGHT: 195 LBS | BODY MASS INDEX: 31.34 KG/M2

## 2022-05-24 DIAGNOSIS — Z12.31 VISIT FOR SCREENING MAMMOGRAM: ICD-10-CM

## 2022-05-24 PROCEDURE — 77067 SCR MAMMO BI INCL CAD: CPT | Mod: 26,,, | Performed by: RADIOLOGY

## 2022-05-24 PROCEDURE — 77067 MAMMO DIGITAL SCREENING BILAT: ICD-10-PCS | Mod: 26,,, | Performed by: RADIOLOGY

## 2022-05-24 PROCEDURE — 77067 SCR MAMMO BI INCL CAD: CPT | Mod: TC

## 2022-05-31 NOTE — PROGRESS NOTES
Subjective:      Patient ID: Audrey Causey is a 56 y.o. female.    Chief Complaint: Back Pain and Leg Pain      Pain  This is a chronic problem. The current episode started more than 1 year ago. The problem occurs daily. The problem has been unchanged. Associated symptoms include arthralgias and neck pain. Pertinent negatives include no change in bowel habit, chest pain, chills, coughing, diaphoresis, fatigue, fever, rash, sore throat, vertigo or vomiting.     Review of Systems   Constitutional: Negative for activity change, appetite change, chills, diaphoresis, fatigue and fever.   HENT: Negative for drooling, ear discharge, ear pain, facial swelling, nosebleeds, sore throat, trouble swallowing, voice change and goiter.    Eyes: Negative for photophobia, pain, discharge, redness and visual disturbance.   Respiratory: Negative for apnea, cough, choking, chest tightness, shortness of breath, wheezing and stridor.    Cardiovascular: Negative for chest pain, palpitations and leg swelling.   Gastrointestinal: Negative for abdominal distention, change in bowel habit, diarrhea, rectal pain, vomiting, fecal incontinence and change in bowel habit.   Endocrine: Negative for cold intolerance, heat intolerance, polydipsia, polyphagia and polyuria.   Genitourinary: Negative for bladder incontinence, dysuria, flank pain, frequency and hot flashes.   Musculoskeletal: Positive for arthralgias, back pain, leg pain, neck pain and neck stiffness.   Integumentary:  Negative for color change, pallor and rash.   Allergic/Immunologic: Negative for immunocompromised state.   Neurological: Negative for dizziness, vertigo, seizures, syncope, facial asymmetry, speech difficulty, light-headedness, disturbances in coordination, memory loss and coordination difficulties.   Hematological: Negative for adenopathy. Does not bruise/bleed easily.   Psychiatric/Behavioral: Negative for agitation, behavioral problems, confusion, decreased  "concentration, dysphoric mood, hallucinations, self-injury and suicidal ideas. The patient is not nervous/anxious and is not hyperactive.             Objective:  Vitals:    06/01/22 0805   BP: (!) 150/88   Pulse: 95   Resp: 18   SpO2: 98%   Weight: 93 kg (205 lb)   Height: 5' 6" (1.676 m)   PainSc:   9         Physical Exam  Vitals and nursing note reviewed. Exam conducted with a chaperone present.   Constitutional:       General: She is awake. She is not in acute distress.     Appearance: Normal appearance. She is not toxic-appearing or diaphoretic.   HENT:      Head: Normocephalic and atraumatic.      Nose: Nose normal.      Mouth/Throat:      Mouth: Mucous membranes are moist.      Pharynx: Oropharynx is clear.   Eyes:      Conjunctiva/sclera: Conjunctivae normal.      Pupils: Pupils are equal, round, and reactive to light.   Cardiovascular:      Rate and Rhythm: Normal rate.   Pulmonary:      Effort: Pulmonary effort is normal. No respiratory distress.   Abdominal:      Palpations: Abdomen is soft.      Tenderness: There is no guarding.   Musculoskeletal:         General: Normal range of motion.      Cervical back: Normal range of motion and neck supple. Tenderness present. No rigidity.      Thoracic back: Tenderness present.      Lumbar back: Tenderness present.      Right knee: Tenderness present.      Left knee: Tenderness present.   Skin:     General: Skin is warm and dry.      Coloration: Skin is not jaundiced or pale.   Neurological:      General: No focal deficit present.      Mental Status: She is alert and oriented to person, place, and time. Mental status is at baseline.      Cranial Nerves: Cranial nerves are intact. No cranial nerve deficit (II-XII).   Psychiatric:         Mood and Affect: Mood normal.         Behavior: Behavior normal. Behavior is cooperative.         Thought Content: Thought content normal.           Mammo Digital Screening Bilat  Narrative: Result:  Mammo Digital Screening " Bilat    History:  Patient is 56 y.o. and is seen for a screening mammogram.  Positive family history of breast cancer - mother, diagnosed in her 70s     Films Compared:05/20/2016 through 03/01/2021     Findings:  The breasts are heterogeneously dense, which may obscure small masses. No   suspicious masses or microcalcifications are seen. No interval changes   have occurred.     A breast examination was performed, and no clinically suspicious palpable   masses were present.   Impression:    No radiographic evidence of malignancy. Routine screening mammograms are   recommended.    BI-RADS Category 1: Negative    Recommendation:  Routine screening mammogram in 1 year is recommended.    Your estimated lifetime risk of breast cancer (to age 85) based on   Tyrer-Cuzick risk assessment model is 12.02 %.  According to the American   Cancer Society, patients with a lifetime breast cancer risk of 20% or   higher might benefit from supplemental screening tests. ??     Cc: [facility]       Office Visit on 04/06/2022   Component Date Value Ref Range Status    POC Amphetamines 04/06/2022 Negative  Negative, Inconclusive Final    POC Barbiturates 04/06/2022 Negative  Negative, Inconclusive Final    POC Benzodiazepines 04/06/2022 Negative  Negative, Inconclusive Final    POC Cocaine 04/06/2022 Negative  Negative, Inconclusive Final    POC THC 04/06/2022 Negative  Negative, Inconclusive Final    POC Methadone 04/06/2022 Negative  Negative, Inconclusive Final    POC Methamphetamine 04/06/2022 Negative  Negative, Inconclusive Final    POC Opiates 04/06/2022 Negative  Negative, Inconclusive Final    POC Oxycodone 04/06/2022 Presumptive Positive (A) Negative, Inconclusive Final    POC Phencyclidine 04/06/2022 Negative  Negative, Inconclusive Final    POC Methylenedioxymethamphetamine * 04/06/2022 Negative  Negative, Inconclusive Final    POC Tricyclic Antidepressants 04/06/2022 Negative  Negative, Inconclusive Final     POC Buprenorphine 04/06/2022 Negative   Final     Acceptable 04/06/2022 Yes   Final    POC Temperature (Urine) 04/06/2022 92   Final   Admission on 03/22/2022, Discharged on 03/22/2022   Component Date Value Ref Range Status    POC Glucose 03/22/2022 120 (A) 70 - 105 mg/dL Final   Lab Visit on 03/18/2022   Component Date Value Ref Range Status    SARS CoV-2 PCR 03/18/2022 Negative  Negative, Invalid Final    Internal Control 03/18/2022 Valid   Final   Lab Visit on 03/03/2022   Component Date Value Ref Range Status    WBC 03/03/2022 6.30  4.50 - 11.00 K/uL Final    RBC 03/03/2022 4.51  4.20 - 5.40 M/uL Final    Hemoglobin 03/03/2022 13.0  12.0 - 16.0 g/dL Final    Hematocrit 03/03/2022 39.5  38.0 - 47.0 % Final    MCV 03/03/2022 87.6  80.0 - 96.0 fL Final    MCH 03/03/2022 28.8  27.0 - 31.0 pg Final    MCHC 03/03/2022 32.9  32.0 - 36.0 g/dL Final    RDW 03/03/2022 13.2  11.5 - 14.5 % Final    Platelet Count 03/03/2022 371  150 - 400 K/uL Final    MPV 03/03/2022 10.0  9.4 - 12.4 fL Final    Neutrophils % 03/03/2022 52.4 (A) 53.0 - 65.0 % Final    Lymphocytes % 03/03/2022 41.3 (A) 27.0 - 41.0 % Final    Monocytes % 03/03/2022 4.4  2.0 - 6.0 % Final    Eosinophils % 03/03/2022 1.4  1.0 - 4.0 % Final    Basophils % 03/03/2022 0.3  0.0 - 1.0 % Final    Immature Granulocytes % 03/03/2022 0.2  0.0 - 0.4 % Final    nRBC, Auto 03/03/2022 0.0  <=0.0 % Final    Neutrophils, Abs 03/03/2022 3.30  1.80 - 7.70 K/uL Final    Lymphocytes, Absolute 03/03/2022 2.60  1.00 - 4.80 K/uL Final    Monocytes, Absolute 03/03/2022 0.28  0.00 - 0.80 K/uL Final    Eosinophils, Absolute 03/03/2022 0.09  0.00 - 0.50 K/uL Final    Basophils, Absolute 03/03/2022 0.02  0.00 - 0.20 K/uL Final    Immature Granulocytes, Absolute 03/03/2022 0.01  0.00 - 0.04 K/uL Final    nRBC, Absolute 03/03/2022 0.00  <=0.00 x10e3/uL Final    Diff Type 03/03/2022 Auto   Final    Sodium 03/03/2022 139  136 - 145 mmol/L  Final    Potassium 03/03/2022 3.2 (A) 3.5 - 5.1 mmol/L Final    Chloride 03/03/2022 103  98 - 107 mmol/L Final    CO2 03/03/2022 33 (A) 21 - 32 mmol/L Final    Anion Gap 03/03/2022 6 (A) 7 - 16 mmol/L Final    Glucose 03/03/2022 121 (A) 74 - 106 mg/dL Final    BUN 03/03/2022 12  7 - 18 mg/dL Final    Creatinine 03/03/2022 0.64  0.55 - 1.02 mg/dL Final    BUN/Creatinine Ratio 03/03/2022 19  6 - 20 Final    Calcium 03/03/2022 9.4  8.5 - 10.1 mg/dL Final    Total Protein 03/03/2022 8.1  6.4 - 8.2 g/dL Final    Albumin 03/03/2022 3.7  3.5 - 5.0 g/dL Final    Globulin 03/03/2022 4.4 (A) 2.0 - 4.0 g/dL Final    A/G Ratio 03/03/2022 0.8   Final    Bilirubin, Total 03/03/2022 0.2  0.0 - 1.2 mg/dL Final    Alk Phos 03/03/2022 108  46 - 118 U/L Final    ALT 03/03/2022 37  13 - 56 U/L Final    AST 03/03/2022 15  15 - 37 U/L Final    eGFR 03/03/2022 102  >=60 mL/min/1.73m² Final    RA Titer 03/03/2022 <10  0 - 15 IU/mL Final    HLA-B27 Result 03/03/2022 Negative  Not Applicable Final    Interpretation 03/03/2022 SEE COMMENTS   Final    CRP 03/03/2022 2.09 (A) 0.00 - 0.80 mg/dL Final    ESR Westergren 03/03/2022 27  0 - 30 mm/Hr Final    Syphilis Ab Interpretation 03/03/2022 Non-Reactive  Non-Reactive Final    Free T4 03/03/2022 1.09  0.76 - 1.46 ng/dL Final    TSH 03/03/2022 3.740  0.358 - 3.740 uIU/mL Final    Lupus Anticoagulant Tech Interp 03/03/2022 SEE COMMENTS   Final    Prothrombin Time (PT), P 03/03/2022 9.9  9.4 - 12.5 sec Final    INR 03/03/2022 0.9  0.9 - 1.1 Final    Activated Partial Thrombopl Time, P 03/03/2022 33  25 - 37 sec Final    DRVVT Screen Ratio 03/03/2022 0.77  <1.20 ratio Final    Vitamin D 25-Hydroxy, Blood 03/03/2022 27.8  ng/mL Final    HIV-1 RNA Detect/Quant, P 03/03/2022 Undetected  Undetected copies/mL Final    Hepatitis C Ab 03/03/2022 Non-Reactive  Non-Reactive Final    Uric Acid 03/03/2022 3.8  2.6 - 6.0 mg/dL Final    Antistreptolysin O (ASO) 03/03/2022 17.0   0.0 - 408.0 IU/mL Final    Anti-dsDNA 03/03/2022 Negative  Negative Final    Anti-DSDNA (IU) 03/03/2022 2  0 - 24 IU Final    C3 03/03/2022 167  90 - 180 mg/dL Final    C4 03/03/2022 33  10 - 40 mg/dL Final    Complement, Total 03/03/2022 68  30 - 75 U/mL Final    Anaplasma phagocytophilum Ab, IgG,S 03/03/2022 <1:64  <1:64 titer Final    Ehrlichia Chaffeensis (HME) Ab, IgG 03/03/2022 <1:64  <1:64 titer Final    Spotted Fever Group Ab, IgG, S 03/03/2022 <1:64  <1:64 Final    Spotted Fever Group Ab, IgM, S 03/03/2022 <1:64  <1:64 Final    Lyme IgG/IgM 03/03/2022 Non-Reactive  Non-Reactive Final    CK 03/03/2022 52  26 - 192 U/L Final   Admission on 01/18/2022, Discharged on 01/18/2022   Component Date Value Ref Range Status    POC Glucose 01/18/2022 100  70 - 110 MG/DL Final    POC Glucose 01/18/2022 100  70 - 105 mg/dL Final   Lab Visit on 01/14/2022   Component Date Value Ref Range Status    COVID-19 Ag 01/14/2022 Negative  Negative, Invalid Final   Office Visit on 01/05/2022   Component Date Value Ref Range Status    pH, UA 01/05/2022 5.5  5.0, 5.5, 6.0, 6.5, 7.0, 7.5, 8.0 pH Units Final    Specific Gravity, UA 01/05/2022 1.020  <=1.005, 1.010, 1.015, 1.020, 1.025, 1.030 Final    Creatinine, Urine 01/05/2022 65  28 - 219 mg/dL Final    6-Acetylmorphine 01/05/2022 Negative  10 ng/mL Final    7-Aminoclonazepam 01/05/2022 Negative  Negative 25 ng/mL Final    a-Hydroxyalprazolam 01/05/2022 Negative  25 ng/mL Final    Acetyl Fentanyl 01/05/2022 Negative  2.5 ng/mL Final    Acetyl Norfentanyl Oxalate 01/05/2022 Negative  5 ng/mL Final    Benzoylecgonine 01/05/2022 Negative  100 ng/mL Final    Buprenorphine 01/05/2022 Negative  25 ng/mL Final    Codeine 01/05/2022 Negative  25 ng/mL Final    EDDP 01/05/2022 Negative  25 ng/mL Final    Fentanyl 01/05/2022 Negative  2.5 ng/mL Final    Hydrocodone 01/05/2022 Negative  25 ng/mL Final    Hydromorphone 01/05/2022 Negative  25 ng/mL Final     Lorazepam 01/05/2022 Negative  25 ng/mL Final    Morphine 01/05/2022 Negative  25 ng/mL Final    Norbuprenorphine 01/05/2022 Negative  25 ng/mL Final    Nordiazepam 01/05/2022 Negative  25 ng/mL Final    Norfentanyl Oxalate 01/05/2022 Negative  5 ng/mL Final    Norhydrocodone 01/05/2022 Negative  50 ng/mL Final    Noroxycodone HCL 01/05/2022 Negative  50 ng/mL Final    Oxazepam 01/05/2022 Negative  25 ng/mL Final    Oxymorphone 01/05/2022 Negative  25 ng/mL Final    Tapentadol 01/05/2022 Negative  25 ng/mL Final    Temazepam 01/05/2022 Negative  25 ng/mL Final    THC-COOH 01/05/2022 Negative  25 ng/mL Final    Tramadol 01/05/2022 Negative  100 ng/mL Final    Amphetamine, Urine 01/05/2022 Negative  Negative 100 ng/mL Final    Methamphetamines, Urine 01/05/2022 Negative  Negative 100 ng/mL Final    Methadone, Urine 01/05/2022 Negative  Negative 25 ng/mL Final    Oxycodone, Urine 01/05/2022 Negative  Negative 25 ng/mL Final   Office Visit on 12/20/2021   Component Date Value Ref Range Status    POC Amphetamines 12/20/2021 Negative  Negative, Inconclusive Final    POC Barbiturates 12/20/2021 Negative  Negative, Inconclusive Final    POC Benzodiazepines 12/20/2021 Negative  Negative, Inconclusive Final    POC Cocaine 12/20/2021 Negative  Negative, Inconclusive Final    POC THC 12/20/2021 Negative  Negative, Inconclusive Final    POC Methadone 12/20/2021 Negative  Negative, Inconclusive Final    POC Methamphetamine 12/20/2021 Negative  Negative, Inconclusive Final    POC Opiates 12/20/2021 Negative  Negative, Inconclusive Final    POC Oxycodone 12/20/2021 Negative  Negative, Inconclusive Final    POC Phencyclidine 12/20/2021 Negative  Negative, Inconclusive Final    POC Methylenedioxymethamphetamine * 12/20/2021 Negative  Negative, Inconclusive Final    POC Tricyclic Antidepressants 12/20/2021 Negative  Negative, Inconclusive Final    POC Buprenorphine 12/20/2021 Negative   Final    Quality  Control Acceptable 12/20/2021 Yes   Final    POC Temperature (Urine) 12/20/2021 94   Final           Assessment:      1. Lumbar radiculopathy, chronic    2. Chronic pain of right knee    3. Osteoarthrosis multiple sites, not specified as generalized          No orders of the defined types were placed in this encounter.        A's of Opioid Risk Assessment  Activity:Patient can perform ADL.   Analgesia:Patients pain is partially controlled by current medication. Patient has tried OTC medications such as Tylenol and Ibuprofen with out relief.   Adverse Effects: Patient denies constipation or sedation.  Aberrant Behavior:  reviewed with no aberrant drug seeking/taking behavior.  Overdose reversal drug naloxone discussed    Drug screen reviewed      Requested Prescriptions     Signed Prescriptions Disp Refills    gabapentin (NEURONTIN) 400 MG capsule 60 capsule 1     Sig: Take 1 capsule (400 mg total) by mouth every 12 (twelve) hours.    oxyCODONE-acetaminophen (PERCOCET)  mg per tablet 60 tablet 0     Sig: Take 1 tablet by mouth every 12 (twelve) hours.    oxyCODONE-acetaminophen (PERCOCET)  mg per tablet 60 tablet 0     Sig: Take 1 tablet by mouth every 12 (twelve) hours.         Plan:    She had bilateral lumbar L4/5 TFESI # 2 March 22, 2022  She states she had 80% relief after procedure in is ongoing    Considering rheumatology referral if needed    She would like to continue with conservative management    Continue home exercise program as directed    Continue current medication    Follow-up 2 months    Dr. Soto, March 2023    Bring original prescription medication bottles/container/box with labels to each visit

## 2022-06-01 ENCOUNTER — OFFICE VISIT (OUTPATIENT)
Dept: PAIN MEDICINE | Facility: CLINIC | Age: 57
End: 2022-06-01
Payer: MEDICARE

## 2022-06-01 VITALS
DIASTOLIC BLOOD PRESSURE: 88 MMHG | OXYGEN SATURATION: 98 % | SYSTOLIC BLOOD PRESSURE: 150 MMHG | HEART RATE: 95 BPM | BODY MASS INDEX: 32.95 KG/M2 | RESPIRATION RATE: 18 BRPM | WEIGHT: 205 LBS | HEIGHT: 66 IN

## 2022-06-01 DIAGNOSIS — M25.561 CHRONIC PAIN OF RIGHT KNEE: Chronic | ICD-10-CM

## 2022-06-01 DIAGNOSIS — M54.16 LUMBAR RADICULOPATHY, CHRONIC: Primary | Chronic | ICD-10-CM

## 2022-06-01 DIAGNOSIS — G89.29 CHRONIC PAIN OF RIGHT KNEE: Chronic | ICD-10-CM

## 2022-06-01 DIAGNOSIS — M89.49 OSTEOARTHROSIS MULTIPLE SITES, NOT SPECIFIED AS GENERALIZED: Chronic | ICD-10-CM

## 2022-06-01 PROCEDURE — 1159F MED LIST DOCD IN RCRD: CPT | Mod: CPTII,,, | Performed by: PHYSICIAN ASSISTANT

## 2022-06-01 PROCEDURE — 3079F PR MOST RECENT DIASTOLIC BLOOD PRESSURE 80-89 MM HG: ICD-10-PCS | Mod: CPTII,,, | Performed by: PHYSICIAN ASSISTANT

## 2022-06-01 PROCEDURE — 99214 OFFICE O/P EST MOD 30 MIN: CPT | Mod: S$PBB,,, | Performed by: PHYSICIAN ASSISTANT

## 2022-06-01 PROCEDURE — 3077F PR MOST RECENT SYSTOLIC BLOOD PRESSURE >= 140 MM HG: ICD-10-PCS | Mod: CPTII,,, | Performed by: PHYSICIAN ASSISTANT

## 2022-06-01 PROCEDURE — 3077F SYST BP >= 140 MM HG: CPT | Mod: CPTII,,, | Performed by: PHYSICIAN ASSISTANT

## 2022-06-01 PROCEDURE — 4010F PR ACE/ARB THEARPY RXD/TAKEN: ICD-10-PCS | Mod: CPTII,,, | Performed by: PHYSICIAN ASSISTANT

## 2022-06-01 PROCEDURE — 99215 OFFICE O/P EST HI 40 MIN: CPT | Mod: PBBFAC | Performed by: PHYSICIAN ASSISTANT

## 2022-06-01 PROCEDURE — 3008F BODY MASS INDEX DOCD: CPT | Mod: CPTII,,, | Performed by: PHYSICIAN ASSISTANT

## 2022-06-01 PROCEDURE — 3079F DIAST BP 80-89 MM HG: CPT | Mod: CPTII,,, | Performed by: PHYSICIAN ASSISTANT

## 2022-06-01 PROCEDURE — 99214 PR OFFICE/OUTPT VISIT, EST, LEVL IV, 30-39 MIN: ICD-10-PCS | Mod: S$PBB,,, | Performed by: PHYSICIAN ASSISTANT

## 2022-06-01 PROCEDURE — 1159F PR MEDICATION LIST DOCUMENTED IN MEDICAL RECORD: ICD-10-PCS | Mod: CPTII,,, | Performed by: PHYSICIAN ASSISTANT

## 2022-06-01 PROCEDURE — 4010F ACE/ARB THERAPY RXD/TAKEN: CPT | Mod: CPTII,,, | Performed by: PHYSICIAN ASSISTANT

## 2022-06-01 PROCEDURE — 3008F PR BODY MASS INDEX (BMI) DOCUMENTED: ICD-10-PCS | Mod: CPTII,,, | Performed by: PHYSICIAN ASSISTANT

## 2022-06-01 RX ORDER — GABAPENTIN 400 MG/1
400 CAPSULE ORAL EVERY 12 HOURS
Qty: 60 CAPSULE | Refills: 1 | Status: SHIPPED | OUTPATIENT
Start: 2022-06-01 | End: 2022-06-23 | Stop reason: SDUPTHER

## 2022-06-01 RX ORDER — OXYCODONE AND ACETAMINOPHEN 10; 325 MG/1; MG/1
1 TABLET ORAL EVERY 12 HOURS
Qty: 60 TABLET | Refills: 0 | Status: SHIPPED | OUTPATIENT
Start: 2022-07-05 | End: 2022-06-28

## 2022-06-01 RX ORDER — OXYCODONE AND ACETAMINOPHEN 10; 325 MG/1; MG/1
1 TABLET ORAL EVERY 12 HOURS
Qty: 60 TABLET | Refills: 0 | Status: SHIPPED | OUTPATIENT
Start: 2022-06-05 | End: 2022-06-23 | Stop reason: SDUPTHER

## 2022-06-23 RX ORDER — OXYCODONE AND ACETAMINOPHEN 10; 325 MG/1; MG/1
1 TABLET ORAL EVERY 12 HOURS
Qty: 60 TABLET | Refills: 0 | Status: CANCELLED | OUTPATIENT
Start: 2022-07-05 | End: 2022-08-04

## 2022-06-23 NOTE — PROGRESS NOTES
Subjective:      Patient ID: Audrey Causey is a 56 y.o. female.    Chief Complaint: Low-back Pain, Leg Pain, and Joint Pain      Pain  This is a chronic problem. The current episode started more than 1 year ago. The problem occurs daily. The problem has been waxing and waning. Associated symptoms include arthralgias and neck pain. Pertinent negatives include no change in bowel habit, chest pain, chills, coughing, diaphoresis, fatigue, fever, rash, sore throat, vertigo or vomiting.     Review of Systems   Constitutional: Negative for activity change, appetite change, chills, diaphoresis, fatigue and fever.   HENT: Negative for drooling, ear discharge, ear pain, facial swelling, nosebleeds, sore throat, trouble swallowing, voice change and goiter.    Eyes: Negative for photophobia, pain, discharge, redness and visual disturbance.   Respiratory: Negative for apnea, cough, choking, chest tightness, shortness of breath, wheezing and stridor.    Cardiovascular: Negative for chest pain, palpitations and leg swelling.   Gastrointestinal: Negative for abdominal distention, change in bowel habit, diarrhea, rectal pain, vomiting, fecal incontinence and change in bowel habit.   Endocrine: Negative for cold intolerance, heat intolerance, polydipsia, polyphagia and polyuria.   Genitourinary: Negative for bladder incontinence, dysuria, flank pain, frequency and hot flashes.   Musculoskeletal: Positive for arthralgias, back pain, leg pain, neck pain and neck stiffness.   Integumentary:  Negative for color change, pallor and rash.   Allergic/Immunologic: Negative for immunocompromised state.   Neurological: Negative for dizziness, vertigo, seizures, syncope, facial asymmetry, speech difficulty, light-headedness, disturbances in coordination, memory loss and coordination difficulties.   Hematological: Negative for adenopathy. Does not bruise/bleed easily.   Psychiatric/Behavioral: Negative for agitation, behavioral problems,  "confusion, decreased concentration, dysphoric mood, hallucinations, self-injury and suicidal ideas. The patient is not nervous/anxious and is not hyperactive.             Objective:  Vitals:    06/28/22 1427   BP: (!) 185/91   Pulse: 68   Weight: 93.5 kg (206 lb 3.2 oz)   Height: 5' 7.2" (1.707 m)   PainSc:   9         Physical Exam  Vitals and nursing note reviewed. Exam conducted with a chaperone present.   Constitutional:       General: She is awake. She is not in acute distress.     Appearance: Normal appearance. She is not toxic-appearing or diaphoretic.   HENT:      Head: Normocephalic and atraumatic.      Nose: Nose normal.      Mouth/Throat:      Mouth: Mucous membranes are moist.      Pharynx: Oropharynx is clear.   Eyes:      Conjunctiva/sclera: Conjunctivae normal.      Pupils: Pupils are equal, round, and reactive to light.   Cardiovascular:      Rate and Rhythm: Normal rate.   Pulmonary:      Effort: Pulmonary effort is normal. No respiratory distress.   Abdominal:      Palpations: Abdomen is soft.      Tenderness: There is no guarding.   Musculoskeletal:         General: Normal range of motion.      Cervical back: Normal range of motion and neck supple. Tenderness present. No rigidity.      Thoracic back: Tenderness present.      Lumbar back: Tenderness present.      Right knee: Tenderness present.      Left knee: Tenderness present.   Skin:     General: Skin is warm and dry.      Coloration: Skin is not jaundiced or pale.   Neurological:      General: No focal deficit present.      Mental Status: She is alert and oriented to person, place, and time. Mental status is at baseline.      Cranial Nerves: No cranial nerve deficit (II-XII).   Psychiatric:         Mood and Affect: Mood normal.         Behavior: Behavior normal. Behavior is cooperative.         Thought Content: Thought content normal.           Mammo Digital Screening Bilat  Narrative: Result:  Mammo Digital Screening " Bilat    History:  Patient is 56 y.o. and is seen for a screening mammogram.  Positive family history of breast cancer - mother, diagnosed in her 70s     Films Compared:05/20/2016 through 03/01/2021     Findings:  The breasts are heterogeneously dense, which may obscure small masses. No   suspicious masses or microcalcifications are seen. No interval changes   have occurred.     A breast examination was performed, and no clinically suspicious palpable   masses were present.   Impression:    No radiographic evidence of malignancy. Routine screening mammograms are   recommended.    BI-RADS Category 1: Negative    Recommendation:  Routine screening mammogram in 1 year is recommended.    Your estimated lifetime risk of breast cancer (to age 85) based on   Tyrer-Cuzick risk assessment model is 12.02 %.  According to the American   Cancer Society, patients with a lifetime breast cancer risk of 20% or   higher might benefit from supplemental screening tests. ??     Cc: [facility]       Office Visit on 04/06/2022   Component Date Value Ref Range Status    POC Amphetamines 04/06/2022 Negative  Negative, Inconclusive Final    POC Barbiturates 04/06/2022 Negative  Negative, Inconclusive Final    POC Benzodiazepines 04/06/2022 Negative  Negative, Inconclusive Final    POC Cocaine 04/06/2022 Negative  Negative, Inconclusive Final    POC THC 04/06/2022 Negative  Negative, Inconclusive Final    POC Methadone 04/06/2022 Negative  Negative, Inconclusive Final    POC Methamphetamine 04/06/2022 Negative  Negative, Inconclusive Final    POC Opiates 04/06/2022 Negative  Negative, Inconclusive Final    POC Oxycodone 04/06/2022 Presumptive Positive (A) Negative, Inconclusive Final    POC Phencyclidine 04/06/2022 Negative  Negative, Inconclusive Final    POC Methylenedioxymethamphetamine * 04/06/2022 Negative  Negative, Inconclusive Final    POC Tricyclic Antidepressants 04/06/2022 Negative  Negative, Inconclusive Final     POC Buprenorphine 04/06/2022 Negative   Final     Acceptable 04/06/2022 Yes   Final    POC Temperature (Urine) 04/06/2022 92   Final   Admission on 03/22/2022, Discharged on 03/22/2022   Component Date Value Ref Range Status    POC Glucose 03/22/2022 120 (A) 70 - 105 mg/dL Final   Lab Visit on 03/18/2022   Component Date Value Ref Range Status    SARS CoV-2 PCR 03/18/2022 Negative  Negative, Invalid Final    Internal Control 03/18/2022 Valid   Final   Lab Visit on 03/03/2022   Component Date Value Ref Range Status    WBC 03/03/2022 6.30  4.50 - 11.00 K/uL Final    RBC 03/03/2022 4.51  4.20 - 5.40 M/uL Final    Hemoglobin 03/03/2022 13.0  12.0 - 16.0 g/dL Final    Hematocrit 03/03/2022 39.5  38.0 - 47.0 % Final    MCV 03/03/2022 87.6  80.0 - 96.0 fL Final    MCH 03/03/2022 28.8  27.0 - 31.0 pg Final    MCHC 03/03/2022 32.9  32.0 - 36.0 g/dL Final    RDW 03/03/2022 13.2  11.5 - 14.5 % Final    Platelet Count 03/03/2022 371  150 - 400 K/uL Final    MPV 03/03/2022 10.0  9.4 - 12.4 fL Final    Neutrophils % 03/03/2022 52.4 (A) 53.0 - 65.0 % Final    Lymphocytes % 03/03/2022 41.3 (A) 27.0 - 41.0 % Final    Monocytes % 03/03/2022 4.4  2.0 - 6.0 % Final    Eosinophils % 03/03/2022 1.4  1.0 - 4.0 % Final    Basophils % 03/03/2022 0.3  0.0 - 1.0 % Final    Immature Granulocytes % 03/03/2022 0.2  0.0 - 0.4 % Final    nRBC, Auto 03/03/2022 0.0  <=0.0 % Final    Neutrophils, Abs 03/03/2022 3.30  1.80 - 7.70 K/uL Final    Lymphocytes, Absolute 03/03/2022 2.60  1.00 - 4.80 K/uL Final    Monocytes, Absolute 03/03/2022 0.28  0.00 - 0.80 K/uL Final    Eosinophils, Absolute 03/03/2022 0.09  0.00 - 0.50 K/uL Final    Basophils, Absolute 03/03/2022 0.02  0.00 - 0.20 K/uL Final    Immature Granulocytes, Absolute 03/03/2022 0.01  0.00 - 0.04 K/uL Final    nRBC, Absolute 03/03/2022 0.00  <=0.00 x10e3/uL Final    Diff Type 03/03/2022 Auto   Final    Sodium 03/03/2022 139  136 - 145 mmol/L  Final    Potassium 03/03/2022 3.2 (A) 3.5 - 5.1 mmol/L Final    Chloride 03/03/2022 103  98 - 107 mmol/L Final    CO2 03/03/2022 33 (A) 21 - 32 mmol/L Final    Anion Gap 03/03/2022 6 (A) 7 - 16 mmol/L Final    Glucose 03/03/2022 121 (A) 74 - 106 mg/dL Final    BUN 03/03/2022 12  7 - 18 mg/dL Final    Creatinine 03/03/2022 0.64  0.55 - 1.02 mg/dL Final    BUN/Creatinine Ratio 03/03/2022 19  6 - 20 Final    Calcium 03/03/2022 9.4  8.5 - 10.1 mg/dL Final    Total Protein 03/03/2022 8.1  6.4 - 8.2 g/dL Final    Albumin 03/03/2022 3.7  3.5 - 5.0 g/dL Final    Globulin 03/03/2022 4.4 (A) 2.0 - 4.0 g/dL Final    A/G Ratio 03/03/2022 0.8   Final    Bilirubin, Total 03/03/2022 0.2  0.0 - 1.2 mg/dL Final    Alk Phos 03/03/2022 108  46 - 118 U/L Final    ALT 03/03/2022 37  13 - 56 U/L Final    AST 03/03/2022 15  15 - 37 U/L Final    eGFR 03/03/2022 102  >=60 mL/min/1.73m² Final    RA Titer 03/03/2022 <10  0 - 15 IU/mL Final    HLA-B27 Result 03/03/2022 Negative  Not Applicable Final    Interpretation 03/03/2022 SEE COMMENTS   Final    CRP 03/03/2022 2.09 (A) 0.00 - 0.80 mg/dL Final    ESR Westergren 03/03/2022 27  0 - 30 mm/Hr Final    Syphilis Ab Interpretation 03/03/2022 Non-Reactive  Non-Reactive Final    Free T4 03/03/2022 1.09  0.76 - 1.46 ng/dL Final    TSH 03/03/2022 3.740  0.358 - 3.740 uIU/mL Final    Lupus Anticoagulant Tech Interp 03/03/2022 SEE COMMENTS   Final    Prothrombin Time (PT), P 03/03/2022 9.9  9.4 - 12.5 sec Final    INR 03/03/2022 0.9  0.9 - 1.1 Final    Activated Partial Thrombopl Time, P 03/03/2022 33  25 - 37 sec Final    DRVVT Screen Ratio 03/03/2022 0.77  <1.20 ratio Final    Vitamin D 25-Hydroxy, Blood 03/03/2022 27.8  ng/mL Final    HIV-1 RNA Detect/Quant, P 03/03/2022 Undetected  Undetected copies/mL Final    Hepatitis C Ab 03/03/2022 Non-Reactive  Non-Reactive Final    Uric Acid 03/03/2022 3.8  2.6 - 6.0 mg/dL Final    Antistreptolysin O (ASO) 03/03/2022 17.0   0.0 - 408.0 IU/mL Final    Anti-dsDNA 03/03/2022 Negative  Negative Final    Anti-DSDNA (IU) 03/03/2022 2  0 - 24 IU Final    C3 03/03/2022 167  90 - 180 mg/dL Final    C4 03/03/2022 33  10 - 40 mg/dL Final    Complement, Total 03/03/2022 68  30 - 75 U/mL Final    Anaplasma phagocytophilum Ab, IgG,S 03/03/2022 <1:64  <1:64 titer Final    Ehrlichia Chaffeensis (HME) Ab, IgG 03/03/2022 <1:64  <1:64 titer Final    Spotted Fever Group Ab, IgG, S 03/03/2022 <1:64  <1:64 Final    Spotted Fever Group Ab, IgM, S 03/03/2022 <1:64  <1:64 Final    Lyme IgG/IgM 03/03/2022 Non-Reactive  Non-Reactive Final    CK 03/03/2022 52  26 - 192 U/L Final   Admission on 01/18/2022, Discharged on 01/18/2022   Component Date Value Ref Range Status    POC Glucose 01/18/2022 100  70 - 110 MG/DL Final    POC Glucose 01/18/2022 100  70 - 105 mg/dL Final   Lab Visit on 01/14/2022   Component Date Value Ref Range Status    COVID-19 Ag 01/14/2022 Negative  Negative, Invalid Final   Office Visit on 01/05/2022   Component Date Value Ref Range Status    pH, UA 01/05/2022 5.5  5.0, 5.5, 6.0, 6.5, 7.0, 7.5, 8.0 pH Units Final    Specific Gravity, UA 01/05/2022 1.020  <=1.005, 1.010, 1.015, 1.020, 1.025, 1.030 Final    Creatinine, Urine 01/05/2022 65  28 - 219 mg/dL Final    6-Acetylmorphine 01/05/2022 Negative  10 ng/mL Final    7-Aminoclonazepam 01/05/2022 Negative  Negative 25 ng/mL Final    a-Hydroxyalprazolam 01/05/2022 Negative  25 ng/mL Final    Acetyl Fentanyl 01/05/2022 Negative  2.5 ng/mL Final    Acetyl Norfentanyl Oxalate 01/05/2022 Negative  5 ng/mL Final    Benzoylecgonine 01/05/2022 Negative  100 ng/mL Final    Buprenorphine 01/05/2022 Negative  25 ng/mL Final    Codeine 01/05/2022 Negative  25 ng/mL Final    EDDP 01/05/2022 Negative  25 ng/mL Final    Fentanyl 01/05/2022 Negative  2.5 ng/mL Final    Hydrocodone 01/05/2022 Negative  25 ng/mL Final    Hydromorphone 01/05/2022 Negative  25 ng/mL Final     Lorazepam 01/05/2022 Negative  25 ng/mL Final    Morphine 01/05/2022 Negative  25 ng/mL Final    Norbuprenorphine 01/05/2022 Negative  25 ng/mL Final    Nordiazepam 01/05/2022 Negative  25 ng/mL Final    Norfentanyl Oxalate 01/05/2022 Negative  5 ng/mL Final    Norhydrocodone 01/05/2022 Negative  50 ng/mL Final    Noroxycodone HCL 01/05/2022 Negative  50 ng/mL Final    Oxazepam 01/05/2022 Negative  25 ng/mL Final    Oxymorphone 01/05/2022 Negative  25 ng/mL Final    Tapentadol 01/05/2022 Negative  25 ng/mL Final    Temazepam 01/05/2022 Negative  25 ng/mL Final    THC-COOH 01/05/2022 Negative  25 ng/mL Final    Tramadol 01/05/2022 Negative  100 ng/mL Final    Amphetamine, Urine 01/05/2022 Negative  Negative 100 ng/mL Final    Methamphetamines, Urine 01/05/2022 Negative  Negative 100 ng/mL Final    Methadone, Urine 01/05/2022 Negative  Negative 25 ng/mL Final    Oxycodone, Urine 01/05/2022 Negative  Negative 25 ng/mL Final           Assessment:      1. Lumbar radiculopathy, chronic    2. Chronic pain of right knee    3. Osteoarthrosis multiple sites, not specified as generalized          Orders Placed This Encounter   Procedures    Case Request Operating Room: Injection, Steroid, Epidural, L4/5 #3     Order Specific Question:   Medical Necessity:     Answer:   Medically Non-Urgent [100]     Order Specific Question:   CPT Code:     Answer:   RI INJ LUMBAR/SACRAL, W/IMAGING GUIDANCE [61931]     Order Specific Question:   Is an on-site pathologist required for this procedure?     Answer:   N/A         A's of Opioid Risk Assessment  Activity:Patient can perform ADL.   Analgesia:Patients pain is partially controlled by current medication. Patient has tried OTC medications such as Tylenol and Ibuprofen with out relief.   Adverse Effects: Patient denies constipation or sedation.  Aberrant Behavior:  reviewed with no aberrant drug seeking/taking behavior.  Overdose reversal drug naloxone discussed    Drug  screen reviewed      Requested Prescriptions     Signed Prescriptions Disp Refills    gabapentin (NEURONTIN) 400 MG capsule 60 capsule 1     Sig: Take 1 capsule (400 mg total) by mouth every 12 (twelve) hours.    oxyCODONE-acetaminophen (PERCOCET)  mg per tablet 60 tablet 0     Sig: Take 1 tablet by mouth every 12 (twelve) hours.         Plan:    She had bilateral lumbar L4/5 TFESI # 2 March 22, 2022  She states she had 80% relief after procedure she states that procedure did help increase her level function she has had more than 3 months pain relief following her procedure    She has complaint increasing back pain buttock and leg pain she is requesting lumbar procedure for her back and leg pain numbness and tingling lower extremities radicular in nature    Considering rheumatology referral CRP was elevated March 2022    Continue home exercise program as directed    Continue current medication    Indications for this procedure for this specific patient include the following     - Injections being provided as part of a comprehensive pain management program.    - Pt has failed 6 weeks of conservative therapy including oral meds, PT and or home exercise program which has been discussed with the patient   - Injection being provided for suspected radicular pain.    - Pain scale of greater than or equal to 3/10 with functional impairment  - No evidence of local or systemic infection, bleeding tendency or unstable medical condition.    - Pain is causing significant functional limitation resulting in diminished quality of life and impaired age appropriate ADL's.   - Repeat injections are done no sooner than 7 days after the previous injection  - Epidural done for suspected radicular pain along dermatome of nerve   - Epidural done to differentiate level of radicular nerve root pain   - Repeat injections done only when pt reports 50% improvement in pain from previous injections    -increased level of function  -  patient is aware if they take any NSAIDs/anticoagulant prior to procedure procedure will be postponed, this was listed on the preop instructions and highlighted, list of NSAIDs/anticoagulant reviewed with patient to include methotrexate  - Injection done at L4/5 level(s) which is consistent with patient's dermatomal pain complaint    Monitor anesthesia request is medically indicated for the scheduled nerve block procedure due to:  1- needle phobia and anxiety, placing  the patient at risk during the provided service.  2-patient has an ASA class greater than 3 and requires constant presence of an anesthesiologist during the procedure,   3-patient has severe problems hard to lie still  4-patient suffers from chronic pain and is unable to function due to  diminished ADLs    Schedule lumbar L4/5 MARCOS # 3, lumbar radiculopathy    Dr. Soto    Bring original prescription medication bottles/container/box with labels to each visit

## 2022-06-28 ENCOUNTER — OFFICE VISIT (OUTPATIENT)
Dept: PAIN MEDICINE | Facility: CLINIC | Age: 57
End: 2022-06-28
Payer: MEDICARE

## 2022-06-28 VITALS
HEIGHT: 67 IN | DIASTOLIC BLOOD PRESSURE: 91 MMHG | SYSTOLIC BLOOD PRESSURE: 185 MMHG | WEIGHT: 206.19 LBS | HEART RATE: 68 BPM | BODY MASS INDEX: 32.36 KG/M2

## 2022-06-28 DIAGNOSIS — G89.29 CHRONIC PAIN OF RIGHT KNEE: Chronic | ICD-10-CM

## 2022-06-28 DIAGNOSIS — M89.49 OSTEOARTHROSIS MULTIPLE SITES, NOT SPECIFIED AS GENERALIZED: Chronic | ICD-10-CM

## 2022-06-28 DIAGNOSIS — M25.561 CHRONIC PAIN OF RIGHT KNEE: Chronic | ICD-10-CM

## 2022-06-28 DIAGNOSIS — M54.16 LUMBAR RADICULOPATHY, CHRONIC: Primary | Chronic | ICD-10-CM

## 2022-06-28 PROCEDURE — 3080F DIAST BP >= 90 MM HG: CPT | Mod: CPTII,,, | Performed by: PHYSICIAN ASSISTANT

## 2022-06-28 PROCEDURE — 3008F BODY MASS INDEX DOCD: CPT | Mod: CPTII,,, | Performed by: PHYSICIAN ASSISTANT

## 2022-06-28 PROCEDURE — 99214 PR OFFICE/OUTPT VISIT, EST, LEVL IV, 30-39 MIN: ICD-10-PCS | Mod: S$PBB,,, | Performed by: PHYSICIAN ASSISTANT

## 2022-06-28 PROCEDURE — 4010F PR ACE/ARB THEARPY RXD/TAKEN: ICD-10-PCS | Mod: CPTII,,, | Performed by: PHYSICIAN ASSISTANT

## 2022-06-28 PROCEDURE — 99215 OFFICE O/P EST HI 40 MIN: CPT | Mod: PBBFAC | Performed by: PHYSICIAN ASSISTANT

## 2022-06-28 PROCEDURE — 3080F PR MOST RECENT DIASTOLIC BLOOD PRESSURE >= 90 MM HG: ICD-10-PCS | Mod: CPTII,,, | Performed by: PHYSICIAN ASSISTANT

## 2022-06-28 PROCEDURE — 4010F ACE/ARB THERAPY RXD/TAKEN: CPT | Mod: CPTII,,, | Performed by: PHYSICIAN ASSISTANT

## 2022-06-28 PROCEDURE — 1159F PR MEDICATION LIST DOCUMENTED IN MEDICAL RECORD: ICD-10-PCS | Mod: CPTII,,, | Performed by: PHYSICIAN ASSISTANT

## 2022-06-28 PROCEDURE — 3008F PR BODY MASS INDEX (BMI) DOCUMENTED: ICD-10-PCS | Mod: CPTII,,, | Performed by: PHYSICIAN ASSISTANT

## 2022-06-28 PROCEDURE — 3077F PR MOST RECENT SYSTOLIC BLOOD PRESSURE >= 140 MM HG: ICD-10-PCS | Mod: CPTII,,, | Performed by: PHYSICIAN ASSISTANT

## 2022-06-28 PROCEDURE — 3077F SYST BP >= 140 MM HG: CPT | Mod: CPTII,,, | Performed by: PHYSICIAN ASSISTANT

## 2022-06-28 PROCEDURE — 1159F MED LIST DOCD IN RCRD: CPT | Mod: CPTII,,, | Performed by: PHYSICIAN ASSISTANT

## 2022-06-28 PROCEDURE — 99214 OFFICE O/P EST MOD 30 MIN: CPT | Mod: S$PBB,,, | Performed by: PHYSICIAN ASSISTANT

## 2022-06-28 RX ORDER — GABAPENTIN 400 MG/1
400 CAPSULE ORAL EVERY 12 HOURS
Qty: 60 CAPSULE | Refills: 1 | Status: SHIPPED | OUTPATIENT
Start: 2022-06-28 | End: 2022-08-02 | Stop reason: SDUPTHER

## 2022-06-28 RX ORDER — OXYCODONE AND ACETAMINOPHEN 10; 325 MG/1; MG/1
1 TABLET ORAL EVERY 12 HOURS
Qty: 60 TABLET | Refills: 0 | Status: SHIPPED | OUTPATIENT
Start: 2022-07-06 | End: 2022-08-02 | Stop reason: SDUPTHER

## 2022-06-28 NOTE — PATIENT INSTRUCTIONS
COVID SCREENING TO BE DONE 48-72 HOURS PRIOR TO PROCEDURE IF PT HAS NOT RECEIVED BOTH COVID VACCINATIONS OR HAS BEEN VACCINATED WITHIN THE LAST 2 WEEKS. VACCINATION CARD MUST BE PROVIDED IF PT HAS BEEN VACCINATED OR PT MUST HAVE COVID SCREENING IN ORDER TO HAVE PROCEDURE.  IF YOUR PROCEDURE IS ON A Tuesday, GET COVID TESTING ON THE Friday PRIOR TO PROCEDURE.  IF YOUR PROCEDURE IS ON A Thursday GET COVID TESTING ON THE Monday OR Tuesday PRIOR TO PROCEDURE.    IF YOUR PRIMARY CARE DOCTOR ORDERS YOUR COVID TESTING YOU MUST BRING YOUR RESULTS WITH YOU TO YOUR PROCEDURE.    Procedure Instructions:    Nothing to eat or drink for 8 hours or after midnight including gum, candy, mints, or tobacco products.  If you are scheduled for 1:30 or later nothing to eat or drink after 5 a.m. the morning of the procedure, including gum, candy, mints, or tobacco products.  Must have a  at least 18 yrs of age to stay present at all times  No Diabetic medications the morning of procedure, check blood sugar the morning of procedure, if it is greater than 200 call the office at [phone]  If you are started on antibiotics or have been prescribed antibiotics, have a fever, or have any other type of infection call to reschedule procedure.  If you take blood pressure medications you can take it at your regular scheduled time.        HOLD ASPIRIN AND ASPIRIN PRODUCTS  (ASPIRIN, BC POWDER ETC. ) FOR 7 DAYS  PRIOR TO PROCEDURE  HOLD NSAIDS( ibuprofen, mobic, meloxicam, advil, diclofenac, methotrexate, aleve etc....)  FOR 3 DAYS   PRIOR TO PROCEDURE

## 2022-07-12 ENCOUNTER — TELEPHONE (OUTPATIENT)
Dept: GASTROENTEROLOGY | Facility: CLINIC | Age: 57
End: 2022-07-12
Payer: MEDICARE

## 2022-07-12 NOTE — TELEPHONE ENCOUNTER
From Andi Mata LPN   Patient has been notified procedure is being withdrawn/canceled due to no MRI or CT on file patient will follow up with GOLDIE ARRIAGA. Scheduled for 7/14/22      Pt was scheduled for L4-L5 MARCOS  On 7-.  Pt was taken off the schedule.

## 2022-08-02 NOTE — PROGRESS NOTES
Subjective:      Patient ID: Audrey Causey is a 57 y.o. female.    Chief Complaint: Back Pain and Leg Pain      Pain  This is a chronic problem. The current episode started more than 1 year ago. The problem occurs daily. The problem has been unchanged. Associated symptoms include arthralgias and neck pain. Pertinent negatives include no change in bowel habit, chest pain, chills, coughing, diaphoresis, fatigue, fever, rash, sore throat, vertigo or vomiting.     Review of Systems   Constitutional: Negative for activity change, appetite change, chills, diaphoresis, fatigue and fever.   HENT: Negative for drooling, ear discharge, ear pain, facial swelling, nosebleeds, sore throat, trouble swallowing, voice change and goiter.    Eyes: Negative for photophobia, pain, discharge, redness and visual disturbance.   Respiratory: Negative for apnea, cough, choking, chest tightness, shortness of breath, wheezing and stridor.    Cardiovascular: Negative for chest pain, palpitations and leg swelling.   Gastrointestinal: Negative for abdominal distention, change in bowel habit, diarrhea, rectal pain, vomiting, fecal incontinence and change in bowel habit.   Endocrine: Negative for cold intolerance, heat intolerance, polydipsia, polyphagia and polyuria.   Genitourinary: Negative for bladder incontinence, dysuria, flank pain, frequency and hot flashes.   Musculoskeletal: Positive for arthralgias, back pain, leg pain, neck pain and neck stiffness.   Integumentary:  Negative for color change, pallor and rash.   Allergic/Immunologic: Negative for immunocompromised state.   Neurological: Negative for dizziness, vertigo, seizures, syncope, facial asymmetry, speech difficulty, light-headedness, disturbances in coordination, memory loss and coordination difficulties.   Hematological: Negative for adenopathy. Does not bruise/bleed easily.   Psychiatric/Behavioral: Negative for agitation, behavioral problems, confusion, decreased  "concentration, dysphoric mood, hallucinations, self-injury and suicidal ideas. The patient is not nervous/anxious and is not hyperactive.             Past Surgical History:   Procedure Laterality Date     SECTION      COLONOSCOPY  2018    Dr. Naranjo    EPIDURAL STEROID INJECTION  2018    L4-5 MARCOS X 5 - Cook    EPIDURAL STEROID INJECTION  2017    L5-S1 MARCOS - Cook    EPIDURAL STEROID INJECTION N/A 2022    Procedure: Injection, Steroid, Epidural, L4/5;  Surgeon: Yari Soto MD;  Location: [facility];  Service: Pain Management;  Laterality: N/A;  MESSAGE LEFT ON VM TO BE TESTED ON 1-14    ESOPHAGOGASTRODUODENOSCOPY  2018    EYE SURGERY      HYSTERECTOMY      INJECTION OF FACET JOINT Bilateral 2017    Bilateral L3-S1 Facet Injection - Cook    LAPAROSCOPIC CHOLECYSTECTOMY N/A 2021    Procedure: CHOLECYSTECTOMY, LAPAROSCOPIC;  Surgeon: Amy Wagner MD;  Location: [facility];  Service: General;  Laterality: N/A;  fully vaccinated    OOPHORECTOMY      TRANSFORAMINAL EPIDURAL INJECTION OF STEROID Bilateral 2022    Procedure: Injection,steroid,epidural,transforaminal approach, L4/5;  Surgeon: Yari Soto MD;  Location: [facility];  Service: Pain Management;  Laterality: Bilateral;  pt aware on ov to be tested       Objective:  Vitals:    22 1030   BP: 133/88   Pulse: 77   Resp: 18   SpO2: 99%   Weight: 92.1 kg (203 lb)   Height: 5' 6" (1.676 m)   PainSc:   9         Physical Exam  Vitals and nursing note reviewed. Exam conducted with a chaperone present.   Constitutional:       General: She is awake. She is not in acute distress.     Appearance: Normal appearance. She is not toxic-appearing or diaphoretic.   HENT:      Head: Normocephalic and atraumatic.      Nose: Nose normal.      Mouth/Throat:      Mouth: Mucous membranes are moist.      Pharynx: Oropharynx is clear.   Eyes:      Conjunctiva/sclera: Conjunctivae normal. "      Pupils: Pupils are equal, round, and reactive to light.   Cardiovascular:      Rate and Rhythm: Normal rate.   Pulmonary:      Effort: Pulmonary effort is normal. No respiratory distress.   Abdominal:      Palpations: Abdomen is soft.      Tenderness: There is no guarding.   Musculoskeletal:         General: Normal range of motion.      Cervical back: Normal range of motion and neck supple. Tenderness present. No rigidity.      Thoracic back: Tenderness present.      Lumbar back: Tenderness present.      Right knee: Tenderness present.      Left knee: Tenderness present.   Skin:     General: Skin is warm and dry.      Coloration: Skin is not jaundiced or pale.   Neurological:      General: No focal deficit present.      Mental Status: She is alert and oriented to person, place, and time. Mental status is at baseline.      Cranial Nerves: No cranial nerve deficit (II-XII).   Psychiatric:         Mood and Affect: Mood normal.         Behavior: Behavior normal. Behavior is cooperative.         Thought Content: Thought content normal.           Mammo Digital Screening Bilat  Narrative: Result:  Mammo Digital Screening Bilat    History:  Patient is 56 y.o. and is seen for a screening mammogram.  Positive family history of breast cancer - mother, diagnosed in her 70s     Films Compared:05/20/2016 through 03/01/2021     Findings:  The breasts are heterogeneously dense, which may obscure small masses. No   suspicious masses or microcalcifications are seen. No interval changes   have occurred.     A breast examination was performed, and no clinically suspicious palpable   masses were present.   Impression:    No radiographic evidence of malignancy. Routine screening mammograms are   recommended.    BI-RADS Category 1: Negative    Recommendation:  Routine screening mammogram in 1 year is recommended.    Your estimated lifetime risk of breast cancer (to age 85) based on   Tyrer-Cuzick risk assessment model is 12.02 %.   According to the American   Cancer Society, patients with a lifetime breast cancer risk of 20% or   higher might benefit from supplemental screening tests. ??     Cc: [facility]       Office Visit on 04/06/2022   Component Date Value Ref Range Status    POC Amphetamines 04/06/2022 Negative  Negative, Inconclusive Final    POC Barbiturates 04/06/2022 Negative  Negative, Inconclusive Final    POC Benzodiazepines 04/06/2022 Negative  Negative, Inconclusive Final    POC Cocaine 04/06/2022 Negative  Negative, Inconclusive Final    POC THC 04/06/2022 Negative  Negative, Inconclusive Final    POC Methadone 04/06/2022 Negative  Negative, Inconclusive Final    POC Methamphetamine 04/06/2022 Negative  Negative, Inconclusive Final    POC Opiates 04/06/2022 Negative  Negative, Inconclusive Final    POC Oxycodone 04/06/2022 Presumptive Positive (A) Negative, Inconclusive Final    POC Phencyclidine 04/06/2022 Negative  Negative, Inconclusive Final    POC Methylenedioxymethamphetamine * 04/06/2022 Negative  Negative, Inconclusive Final    POC Tricyclic Antidepressants 04/06/2022 Negative  Negative, Inconclusive Final    POC Buprenorphine 04/06/2022 Negative   Final     Acceptable 04/06/2022 Yes   Final    POC Temperature (Urine) 04/06/2022 92   Final   Admission on 03/22/2022, Discharged on 03/22/2022   Component Date Value Ref Range Status    POC Glucose 03/22/2022 120 (A) 70 - 105 mg/dL Final   Lab Visit on 03/18/2022   Component Date Value Ref Range Status    SARS CoV-2 PCR 03/18/2022 Negative  Negative, Invalid Final    Internal Control 03/18/2022 Valid   Final   Lab Visit on 03/03/2022   Component Date Value Ref Range Status    WBC 03/03/2022 6.30  4.50 - 11.00 K/uL Final    RBC 03/03/2022 4.51  4.20 - 5.40 M/uL Final    Hemoglobin 03/03/2022 13.0  12.0 - 16.0 g/dL Final    Hematocrit 03/03/2022 39.5  38.0 - 47.0 % Final    MCV 03/03/2022 87.6  80.0 - 96.0 fL Final    MCH  03/03/2022 28.8  27.0 - 31.0 pg Final    MCHC 03/03/2022 32.9  32.0 - 36.0 g/dL Final    RDW 03/03/2022 13.2  11.5 - 14.5 % Final    Platelet Count 03/03/2022 371  150 - 400 K/uL Final    MPV 03/03/2022 10.0  9.4 - 12.4 fL Final    Neutrophils % 03/03/2022 52.4 (A) 53.0 - 65.0 % Final    Lymphocytes % 03/03/2022 41.3 (A) 27.0 - 41.0 % Final    Monocytes % 03/03/2022 4.4  2.0 - 6.0 % Final    Eosinophils % 03/03/2022 1.4  1.0 - 4.0 % Final    Basophils % 03/03/2022 0.3  0.0 - 1.0 % Final    Immature Granulocytes % 03/03/2022 0.2  0.0 - 0.4 % Final    nRBC, Auto 03/03/2022 0.0  <=0.0 % Final    Neutrophils, Abs 03/03/2022 3.30  1.80 - 7.70 K/uL Final    Lymphocytes, Absolute 03/03/2022 2.60  1.00 - 4.80 K/uL Final    Monocytes, Absolute 03/03/2022 0.28  0.00 - 0.80 K/uL Final    Eosinophils, Absolute 03/03/2022 0.09  0.00 - 0.50 K/uL Final    Basophils, Absolute 03/03/2022 0.02  0.00 - 0.20 K/uL Final    Immature Granulocytes, Absolute 03/03/2022 0.01  0.00 - 0.04 K/uL Final    nRBC, Absolute 03/03/2022 0.00  <=0.00 x10e3/uL Final    Diff Type 03/03/2022 Auto   Final    Sodium 03/03/2022 139  136 - 145 mmol/L Final    Potassium 03/03/2022 3.2 (A) 3.5 - 5.1 mmol/L Final    Chloride 03/03/2022 103  98 - 107 mmol/L Final    CO2 03/03/2022 33 (A) 21 - 32 mmol/L Final    Anion Gap 03/03/2022 6 (A) 7 - 16 mmol/L Final    Glucose 03/03/2022 121 (A) 74 - 106 mg/dL Final    BUN 03/03/2022 12  7 - 18 mg/dL Final    Creatinine 03/03/2022 0.64  0.55 - 1.02 mg/dL Final    BUN/Creatinine Ratio 03/03/2022 19  6 - 20 Final    Calcium 03/03/2022 9.4  8.5 - 10.1 mg/dL Final    Total Protein 03/03/2022 8.1  6.4 - 8.2 g/dL Final    Albumin 03/03/2022 3.7  3.5 - 5.0 g/dL Final    Globulin 03/03/2022 4.4 (A) 2.0 - 4.0 g/dL Final    A/G Ratio 03/03/2022 0.8   Final    Bilirubin, Total 03/03/2022 0.2  0.0 - 1.2 mg/dL Final    Alk Phos 03/03/2022 108  46 - 118 U/L Final    ALT 03/03/2022 37  13 - 56 U/L  Final    AST 03/03/2022 15  15 - 37 U/L Final    eGFR 03/03/2022 102  >=60 mL/min/1.73m² Final    RA Titer 03/03/2022 <10  0 - 15 IU/mL Final    HLA-B27 Result 03/03/2022 Negative  Not Applicable Final    Interpretation 03/03/2022 SEE COMMENTS   Final    CRP 03/03/2022 2.09 (A) 0.00 - 0.80 mg/dL Final    ESR Westergren 03/03/2022 27  0 - 30 mm/Hr Final    Syphilis Ab Interpretation 03/03/2022 Non-Reactive  Non-Reactive Final    Free T4 03/03/2022 1.09  0.76 - 1.46 ng/dL Final    TSH 03/03/2022 3.740  0.358 - 3.740 uIU/mL Final    Lupus Anticoagulant Tech Interp 03/03/2022 SEE COMMENTS   Final    Prothrombin Time (PT), P 03/03/2022 9.9  9.4 - 12.5 sec Final    INR 03/03/2022 0.9  0.9 - 1.1 Final    Activated Partial Thrombopl Time, P 03/03/2022 33  25 - 37 sec Final    DRVVT Screen Ratio 03/03/2022 0.77  <1.20 ratio Final    Vitamin D 25-Hydroxy, Blood 03/03/2022 27.8  ng/mL Final    HIV-1 RNA Detect/Quant, P 03/03/2022 Undetected  Undetected copies/mL Final    Hepatitis C Ab 03/03/2022 Non-Reactive  Non-Reactive Final    Uric Acid 03/03/2022 3.8  2.6 - 6.0 mg/dL Final    Antistreptolysin O (ASO) 03/03/2022 17.0  0.0 - 408.0 IU/mL Final    Anti-dsDNA 03/03/2022 Negative  Negative Final    Anti-DSDNA (IU) 03/03/2022 2  0 - 24 IU Final    C3 03/03/2022 167  90 - 180 mg/dL Final    C4 03/03/2022 33  10 - 40 mg/dL Final    Complement, Total 03/03/2022 68  30 - 75 U/mL Final    Anaplasma phagocytophilum Ab, IgG,S 03/03/2022 <1:64  <1:64 titer Final    Ehrlichia Chaffeensis (HME) Ab, IgG 03/03/2022 <1:64  <1:64 titer Final    Spotted Fever Group Ab, IgG, S 03/03/2022 <1:64  <1:64 Final    Spotted Fever Group Ab, IgM, S 03/03/2022 <1:64  <1:64 Final    Lyme IgG/IgM 03/03/2022 Non-Reactive  Non-Reactive Final    CK 03/03/2022 52  26 - 192 U/L Final           Assessment:      1. Lumbar radiculopathy, chronic    2. Chronic pain of right knee    3. Osteoarthrosis multiple sites, not specified as  generalized    4. Encounter for long-term (current) use of other medications          Orders Placed This Encounter   Procedures    POCT Urine Drug Screen Presump     Interpretive Information:     Negative:  No drug detected at the cut off level.   Positive:  This result represents presumptive positive for the   tested drug, other substances may yield a positive response other   than the analyte of interest. This result should be utilized for   diagnostic purpose only. Confirmation testing will be performed upon physician request only.            A's of Opioid Risk Assessment  Activity:Patient can perform ADL.   Analgesia:Patients pain is partially controlled by current medication. Patient has tried OTC medications such as Tylenol and Ibuprofen with out relief.   Adverse Effects: Patient denies constipation or sedation.  Aberrant Behavior:  reviewed with no aberrant drug seeking/taking behavior.  Overdose reversal drug naloxone discussed    Drug screen reviewed      Requested Prescriptions     Signed Prescriptions Disp Refills    gabapentin (NEURONTIN) 400 MG capsule 60 capsule 1     Sig: Take 1 capsule (400 mg total) by mouth every 12 (twelve) hours.    oxyCODONE-acetaminophen (PERCOCET)  mg per tablet 60 tablet 0     Sig: Take 1 tablet by mouth every 12 (twelve) hours.    oxyCODONE-acetaminophen (PERCOCET)  mg per tablet 60 tablet 0     Sig: Take 1 tablet by mouth every 12 (twelve) hours.         Plan:    Insurance denied procedure due to lack of MRI lumbar spine    She had bilateral lumbar L4/5 TFESI # 2 March 22, 2022  She states she had 80% relief after procedure she states that procedure did help increase her level function she has had more than 3 months pain relief following her procedure    Complains of back pain buttock and leg pain she is requesting lumbar procedure for her back and leg pain numbness and tingling lower extremities radicular in nature    Considering physical therapy she is  considering changing to Medicare is primary    Continue home exercise program as directed    Continue current medication    Follow-up 2 months sooner if needed    Dr. Soto, March 2023    Bring original prescription medication bottles/container/box with labels to each visit

## 2022-08-03 ENCOUNTER — OFFICE VISIT (OUTPATIENT)
Dept: PAIN MEDICINE | Facility: CLINIC | Age: 57
End: 2022-08-03
Payer: MEDICARE

## 2022-08-03 VITALS
HEART RATE: 77 BPM | OXYGEN SATURATION: 99 % | BODY MASS INDEX: 32.62 KG/M2 | HEIGHT: 66 IN | SYSTOLIC BLOOD PRESSURE: 133 MMHG | RESPIRATION RATE: 18 BRPM | WEIGHT: 203 LBS | DIASTOLIC BLOOD PRESSURE: 88 MMHG

## 2022-08-03 DIAGNOSIS — M54.16 LUMBAR RADICULOPATHY, CHRONIC: Primary | Chronic | ICD-10-CM

## 2022-08-03 DIAGNOSIS — Z79.899 ENCOUNTER FOR LONG-TERM (CURRENT) USE OF OTHER MEDICATIONS: ICD-10-CM

## 2022-08-03 DIAGNOSIS — M89.49 OSTEOARTHROSIS MULTIPLE SITES, NOT SPECIFIED AS GENERALIZED: Chronic | ICD-10-CM

## 2022-08-03 DIAGNOSIS — M25.561 CHRONIC PAIN OF RIGHT KNEE: Chronic | ICD-10-CM

## 2022-08-03 DIAGNOSIS — G89.29 CHRONIC PAIN OF RIGHT KNEE: Chronic | ICD-10-CM

## 2022-08-03 PROCEDURE — 3079F DIAST BP 80-89 MM HG: CPT | Mod: CPTII,,, | Performed by: PHYSICIAN ASSISTANT

## 2022-08-03 PROCEDURE — 1159F MED LIST DOCD IN RCRD: CPT | Mod: CPTII,,, | Performed by: PHYSICIAN ASSISTANT

## 2022-08-03 PROCEDURE — 1159F PR MEDICATION LIST DOCUMENTED IN MEDICAL RECORD: ICD-10-PCS | Mod: CPTII,,, | Performed by: PHYSICIAN ASSISTANT

## 2022-08-03 PROCEDURE — 96372 THER/PROPH/DIAG INJ SC/IM: CPT | Mod: PBBFAC | Performed by: PHYSICIAN ASSISTANT

## 2022-08-03 PROCEDURE — 3079F PR MOST RECENT DIASTOLIC BLOOD PRESSURE 80-89 MM HG: ICD-10-PCS | Mod: CPTII,,, | Performed by: PHYSICIAN ASSISTANT

## 2022-08-03 PROCEDURE — 4010F ACE/ARB THERAPY RXD/TAKEN: CPT | Mod: CPTII,,, | Performed by: PHYSICIAN ASSISTANT

## 2022-08-03 PROCEDURE — 99214 PR OFFICE/OUTPT VISIT, EST, LEVL IV, 30-39 MIN: ICD-10-PCS | Mod: S$PBB,,, | Performed by: PHYSICIAN ASSISTANT

## 2022-08-03 PROCEDURE — 3075F SYST BP GE 130 - 139MM HG: CPT | Mod: CPTII,,, | Performed by: PHYSICIAN ASSISTANT

## 2022-08-03 PROCEDURE — 4010F PR ACE/ARB THEARPY RXD/TAKEN: ICD-10-PCS | Mod: CPTII,,, | Performed by: PHYSICIAN ASSISTANT

## 2022-08-03 PROCEDURE — 80305 DRUG TEST PRSMV DIR OPT OBS: CPT | Mod: PBBFAC | Performed by: PHYSICIAN ASSISTANT

## 2022-08-03 PROCEDURE — 99214 OFFICE O/P EST MOD 30 MIN: CPT | Mod: S$PBB,,, | Performed by: PHYSICIAN ASSISTANT

## 2022-08-03 PROCEDURE — 3008F PR BODY MASS INDEX (BMI) DOCUMENTED: ICD-10-PCS | Mod: CPTII,,, | Performed by: PHYSICIAN ASSISTANT

## 2022-08-03 PROCEDURE — 3075F PR MOST RECENT SYSTOLIC BLOOD PRESS GE 130-139MM HG: ICD-10-PCS | Mod: CPTII,,, | Performed by: PHYSICIAN ASSISTANT

## 2022-08-03 PROCEDURE — 3008F BODY MASS INDEX DOCD: CPT | Mod: CPTII,,, | Performed by: PHYSICIAN ASSISTANT

## 2022-08-03 PROCEDURE — 99215 OFFICE O/P EST HI 40 MIN: CPT | Mod: PBBFAC,25 | Performed by: PHYSICIAN ASSISTANT

## 2022-08-03 RX ORDER — OXYCODONE AND ACETAMINOPHEN 10; 325 MG/1; MG/1
1 TABLET ORAL EVERY 12 HOURS
Qty: 60 TABLET | Refills: 0 | Status: SHIPPED | OUTPATIENT
Start: 2022-08-04 | End: 2022-09-03

## 2022-08-03 RX ORDER — GABAPENTIN 400 MG/1
400 CAPSULE ORAL EVERY 12 HOURS
Qty: 60 CAPSULE | Refills: 1 | Status: SHIPPED | OUTPATIENT
Start: 2022-08-03 | End: 2022-09-27 | Stop reason: SDUPTHER

## 2022-08-03 RX ORDER — KETOROLAC TROMETHAMINE 30 MG/ML
60 INJECTION, SOLUTION INTRAMUSCULAR; INTRAVENOUS
Status: COMPLETED | OUTPATIENT
Start: 2022-08-03 | End: 2022-08-03

## 2022-08-03 RX ORDER — OXYCODONE AND ACETAMINOPHEN 10; 325 MG/1; MG/1
1 TABLET ORAL EVERY 12 HOURS
Qty: 60 TABLET | Refills: 0 | Status: SHIPPED | OUTPATIENT
Start: 2022-09-03 | End: 2022-09-27 | Stop reason: SDUPTHER

## 2022-08-03 RX ADMIN — KETOROLAC TROMETHAMINE 60 MG: 60 INJECTION, SOLUTION INTRAMUSCULAR at 11:08

## 2022-09-27 NOTE — PROGRESS NOTES
Subjective:         Patient ID: Audrey Causey is a 57 y.o. female.    Chief Complaint: Low-back Pain, Leg Pain, and Knee Pain      Pain  This is a chronic problem. The current episode started more than 1 year ago. The problem occurs daily. The problem has been waxing and waning. Associated symptoms include arthralgias and neck pain. Pertinent negatives include no abdominal pain, anorexia, change in bowel habit, chest pain, chills, coughing, diaphoresis, fatigue, fever, rash, sore throat, vertigo or vomiting.   Review of Systems   Constitutional:  Negative for activity change, appetite change, chills, diaphoresis, fatigue, fever and unexpected weight change.   HENT:  Negative for drooling, ear discharge, ear pain, facial swelling, nosebleeds, sore throat, trouble swallowing, voice change and goiter.    Eyes:  Negative for photophobia, pain, discharge, redness and visual disturbance.   Respiratory:  Negative for apnea, cough, choking, chest tightness, shortness of breath, wheezing and stridor.    Cardiovascular:  Negative for chest pain, palpitations and leg swelling.   Gastrointestinal:  Negative for abdominal distention, abdominal pain, anorexia, change in bowel habit, diarrhea, rectal pain, vomiting, fecal incontinence and change in bowel habit.   Endocrine: Negative for cold intolerance, heat intolerance, polydipsia, polyphagia and polyuria.   Genitourinary:  Negative for bladder incontinence, dysuria, flank pain, frequency and hot flashes.   Musculoskeletal:  Positive for arthralgias, back pain, leg pain, neck pain and neck stiffness.   Integumentary:  Negative for color change, pallor and rash.   Allergic/Immunologic: Negative for immunocompromised state.   Neurological:  Negative for dizziness, vertigo, seizures, syncope, facial asymmetry, speech difficulty, light-headedness, coordination difficulties, memory loss and coordination difficulties.   Hematological:  Negative for adenopathy. Does not bruise/bleed  easily.   Psychiatric/Behavioral:  Negative for agitation, behavioral problems, confusion, decreased concentration, dysphoric mood, hallucinations, self-injury and suicidal ideas. The patient is not nervous/anxious and is not hyperactive.          Past Medical History:   Diagnosis Date    Acute superficial gastritis without hemorrhage 2021    Diabetes mellitus     Diverticula, colon 2021    Esophageal dysphagia 2021    GERD (gastroesophageal reflux disease)     Hypertension     Low back pain     Lumbar radiculopathy     Seizures     Sleep apnea      Past Surgical History:   Procedure Laterality Date     SECTION      COLONOSCOPY  2018    Dr. Naranjo    EPIDURAL STEROID INJECTION  2018    L4-5 MARCOS X 5 - Cook    EPIDURAL STEROID INJECTION  2017    L5-S1 MARCOS - Cook    EPIDURAL STEROID INJECTION N/A 2022    Procedure: Injection, Steroid, Epidural, L4/5;  Surgeon: Yari Soto MD;  Location: [facility];  Service: Pain Management;  Laterality: N/A;  MESSAGE LEFT ON VM TO BE TESTED ON 1-14    ESOPHAGOGASTRODUODENOSCOPY  2018    EYE SURGERY      HYSTERECTOMY      INJECTION OF FACET JOINT Bilateral 2017    Bilateral L3-S1 Facet Injection - Cook    LAPAROSCOPIC CHOLECYSTECTOMY N/A 2021    Procedure: CHOLECYSTECTOMY, LAPAROSCOPIC;  Surgeon: Amy Wagner MD;  Location: [facility] OR;  Service: General;  Laterality: N/A;  fully vaccinated    OOPHORECTOMY      TRANSFORAMINAL EPIDURAL INJECTION OF STEROID Bilateral 2022    Procedure: Injection,steroid,epidural,transforaminal approach, L4/5;  Surgeon: Yari Soto MD;  Location: [facility];  Service: Pain Management;  Laterality: Bilateral;  pt aware on ov to be tested     Social History     Socioeconomic History    Marital status:    Tobacco Use    Smoking status: Never    Smokeless tobacco: Never   Substance and Sexual Activity    Alcohol use: Never    Drug use: Never  "    Family History   Problem Relation Age of Onset    Hypertension Mother     Breast cancer Mother     Thyroid cancer Mother     Diabetes type II Mother     Stroke Mother     Heart attack Mother      Review of patient's allergies indicates:  No Known Allergies     Objective:  Vitals:    09/28/22 0928   Weight: 94.2 kg (207 lb 9.6 oz)   Height: 5' 7.2" (1.707 m)   PainSc:   8         Physical Exam  Vitals and nursing note reviewed. Exam conducted with a chaperone present.   Constitutional:       General: She is awake. She is not in acute distress.     Appearance: Normal appearance. She is not ill-appearing, toxic-appearing or diaphoretic.   HENT:      Head: Normocephalic and atraumatic.      Nose: Nose normal.      Mouth/Throat:      Mouth: Mucous membranes are moist.      Pharynx: Oropharynx is clear.   Eyes:      Conjunctiva/sclera: Conjunctivae normal.      Pupils: Pupils are equal, round, and reactive to light.   Cardiovascular:      Rate and Rhythm: Normal rate.   Pulmonary:      Effort: Pulmonary effort is normal. No respiratory distress.   Abdominal:      Palpations: Abdomen is soft.      Tenderness: There is no guarding.   Musculoskeletal:         General: Normal range of motion.      Cervical back: Normal range of motion and neck supple. Tenderness present. No rigidity.      Thoracic back: Tenderness present.      Lumbar back: Tenderness present.      Right knee: Tenderness present.      Left knee: Tenderness present.   Skin:     General: Skin is warm and dry.      Coloration: Skin is not jaundiced or pale.   Neurological:      General: No focal deficit present.      Mental Status: She is alert and oriented to person, place, and time. Mental status is at baseline.      Cranial Nerves: No cranial nerve deficit (II-XII).   Psychiatric:         Mood and Affect: Mood normal.         Behavior: Behavior normal. Behavior is cooperative.         Thought Content: Thought content normal.         Mammo Digital " Screening Bilat  Narrative: Result:  Mammo Digital Screening Bilat    History:  Patient is 56 y.o. and is seen for a screening mammogram.  Positive family history of breast cancer - mother, diagnosed in her 70s     Films Compared:05/20/2016 through 03/01/2021     Findings:  The breasts are heterogeneously dense, which may obscure small masses. No   suspicious masses or microcalcifications are seen. No interval changes   have occurred.     A breast examination was performed, and no clinically suspicious palpable   masses were present.   Impression:    No radiographic evidence of malignancy. Routine screening mammograms are   recommended.    BI-RADS Category 1: Negative    Recommendation:  Routine screening mammogram in 1 year is recommended.    Your estimated lifetime risk of breast cancer (to age 85) based on   Tyrer-Cuzick risk assessment model is 12.02 %.  According to the American   Cancer Society, patients with a lifetime breast cancer risk of 20% or   higher might benefit from supplemental screening tests. ??     Cc: [facility]       Office Visit on 08/03/2022   Component Date Value Ref Range Status    POC Amphetamines 08/03/2022 Negative  Negative, Inconclusive Final    POC Barbiturates 08/03/2022 Negative  Negative, Inconclusive Final    POC Benzodiazepines 08/03/2022 Negative  Negative, Inconclusive Final    POC Cocaine 08/03/2022 Negative  Negative, Inconclusive Final    POC THC 08/03/2022 Negative  Negative, Inconclusive Final    POC Methadone 08/03/2022 Negative  Negative, Inconclusive Final    POC Methamphetamine 08/03/2022 Negative  Negative, Inconclusive Final    POC Opiates 08/03/2022 Negative  Negative, Inconclusive Final    POC Oxycodone 08/03/2022 Presumptive Positive (A)  Negative, Inconclusive Final    POC Phencyclidine 08/03/2022 Negative  Negative, Inconclusive Final    POC Methylenedioxymethamphetamine * 08/03/2022 Negative  Negative, Inconclusive Final    POC Tricyclic Antidepressants  08/03/2022 Negative  Negative, Inconclusive Final    POC Buprenorphine 08/03/2022 Negative   Final     Acceptable 08/03/2022 Yes   Final    POC Temperature (Urine) 08/03/2022 92   Final   Office Visit on 04/06/2022   Component Date Value Ref Range Status    POC Amphetamines 04/06/2022 Negative  Negative, Inconclusive Final    POC Barbiturates 04/06/2022 Negative  Negative, Inconclusive Final    POC Benzodiazepines 04/06/2022 Negative  Negative, Inconclusive Final    POC Cocaine 04/06/2022 Negative  Negative, Inconclusive Final    POC THC 04/06/2022 Negative  Negative, Inconclusive Final    POC Methadone 04/06/2022 Negative  Negative, Inconclusive Final    POC Methamphetamine 04/06/2022 Negative  Negative, Inconclusive Final    POC Opiates 04/06/2022 Negative  Negative, Inconclusive Final    POC Oxycodone 04/06/2022 Presumptive Positive (A)  Negative, Inconclusive Final    POC Phencyclidine 04/06/2022 Negative  Negative, Inconclusive Final    POC Methylenedioxymethamphetamine * 04/06/2022 Negative  Negative, Inconclusive Final    POC Tricyclic Antidepressants 04/06/2022 Negative  Negative, Inconclusive Final    POC Buprenorphine 04/06/2022 Negative   Final     Acceptable 04/06/2022 Yes   Final    POC Temperature (Urine) 04/06/2022 92   Final         No orders of the defined types were placed in this encounter.      Requested Prescriptions     Pending Prescriptions Disp Refills    gabapentin (NEURONTIN) 400 MG capsule 60 capsule 1     Sig: Take 1 capsule (400 mg total) by mouth every 12 (twelve) hours.    oxyCODONE-acetaminophen (PERCOCET)  mg per tablet 60 tablet 0     Sig: Take 1 tablet by mouth every 12 (twelve) hours.    oxyCODONE-acetaminophen (PERCOCET)  mg per tablet 60 tablet 0     Sig: Take 1 tablet by mouth every 12 (twelve) hours.       Assessment:     1. Lumbar radiculopathy, chronic    2. Chronic pain of right knee    3. Osteoarthrosis multiple sites, not  specified as generalized         A's of Opioid Risk Assessment  Activity:Patient can perform ADL.   Analgesia:Patients pain is partially controlled by current medication. Patient has tried OTC medications such as Tylenol and Ibuprofen with out relief.   Adverse Effects: Patient denies constipation or sedation.  Aberrant Behavior:  reviewed with no aberrant drug seeking/taking behavior.  Overdose reversal drug naloxone discussed    Drug screen reviewed      Plan:    Insurance denied procedure due to lack of MRI lumbar spine    She had bilateral lumbar L4/5 TFESI # 2 March 22, 2022  She states she had 80% relief after procedure she states that procedure did help increase her level function she has had more than 3 months pain relief following her procedure    Considering further lumbar procedures requesting assistance with MRI lumbar spine     Continues complaint back pain buttock and leg pain radicular in nature     Continues complain multiple joint pain itchy dry eyes very red today    Will redraw CBC ESR CRP ISACC     Patient considering referral to [facility]    Requesting Toradol injection    Toradol 60 mg IM, tolerated well    Complains of back pain buttock and leg pain she is requesting lumbar procedure for her back and leg pain numbness and tingling lower extremities radicular in nature    Continues her home exercise program 3-4 days a week as directed     X-ray bilateral hips     MRI lumbar spine no contrast lumbar radiculopathy     MRI for consideration procedure/surgery    I have given the patient medically directed home exercise program    Patient has tried NSAIDs and neuromodulators (neurontin, lyrica, elavil, or cymbalta)    Continue current medication    Follow-up 2 months sooner if needed    Dr. Soto, March 2023    Bring original prescription medication bottles/container/box with labels to each visit    Pill count    Physical therapy    Massage therapy declines

## 2022-09-28 ENCOUNTER — OFFICE VISIT (OUTPATIENT)
Dept: PAIN MEDICINE | Facility: CLINIC | Age: 57
End: 2022-09-28
Payer: MEDICARE

## 2022-09-28 VITALS — BODY MASS INDEX: 32.59 KG/M2 | WEIGHT: 207.63 LBS | HEIGHT: 67 IN

## 2022-09-28 DIAGNOSIS — M25.561 CHRONIC PAIN OF RIGHT KNEE: Chronic | ICD-10-CM

## 2022-09-28 DIAGNOSIS — M54.16 LUMBAR RADICULOPATHY, CHRONIC: Primary | Chronic | ICD-10-CM

## 2022-09-28 DIAGNOSIS — G89.29 CHRONIC PAIN OF RIGHT KNEE: Chronic | ICD-10-CM

## 2022-09-28 DIAGNOSIS — M89.49 OSTEOARTHROSIS MULTIPLE SITES, NOT SPECIFIED AS GENERALIZED: Chronic | ICD-10-CM

## 2022-09-28 PROCEDURE — 1159F PR MEDICATION LIST DOCUMENTED IN MEDICAL RECORD: ICD-10-PCS | Mod: CPTII,,, | Performed by: PHYSICIAN ASSISTANT

## 2022-09-28 PROCEDURE — 4010F ACE/ARB THERAPY RXD/TAKEN: CPT | Mod: CPTII,,, | Performed by: PHYSICIAN ASSISTANT

## 2022-09-28 PROCEDURE — 99214 OFFICE O/P EST MOD 30 MIN: CPT | Mod: S$PBB,25,, | Performed by: PHYSICIAN ASSISTANT

## 2022-09-28 PROCEDURE — 3008F PR BODY MASS INDEX (BMI) DOCUMENTED: ICD-10-PCS | Mod: CPTII,,, | Performed by: PHYSICIAN ASSISTANT

## 2022-09-28 PROCEDURE — 4010F PR ACE/ARB THEARPY RXD/TAKEN: ICD-10-PCS | Mod: CPTII,,, | Performed by: PHYSICIAN ASSISTANT

## 2022-09-28 PROCEDURE — 99214 OFFICE O/P EST MOD 30 MIN: CPT | Mod: PBBFAC | Performed by: PHYSICIAN ASSISTANT

## 2022-09-28 PROCEDURE — 3008F BODY MASS INDEX DOCD: CPT | Mod: CPTII,,, | Performed by: PHYSICIAN ASSISTANT

## 2022-09-28 PROCEDURE — 1159F MED LIST DOCD IN RCRD: CPT | Mod: CPTII,,, | Performed by: PHYSICIAN ASSISTANT

## 2022-09-28 PROCEDURE — 99214 PR OFFICE/OUTPT VISIT, EST, LEVL IV, 30-39 MIN: ICD-10-PCS | Mod: S$PBB,25,, | Performed by: PHYSICIAN ASSISTANT

## 2022-09-28 PROCEDURE — 96372 THER/PROPH/DIAG INJ SC/IM: CPT | Mod: PBBFAC | Performed by: PHYSICIAN ASSISTANT

## 2022-09-28 RX ORDER — OXYCODONE AND ACETAMINOPHEN 10; 325 MG/1; MG/1
1 TABLET ORAL EVERY 12 HOURS
Qty: 60 TABLET | Refills: 0 | Status: CANCELLED | OUTPATIENT
Start: 2022-11-02 | End: 2022-12-02

## 2022-09-28 RX ORDER — GABAPENTIN 400 MG/1
400 CAPSULE ORAL EVERY 12 HOURS
Qty: 60 CAPSULE | Refills: 0 | Status: SHIPPED | OUTPATIENT
Start: 2022-09-28 | End: 2022-11-17 | Stop reason: SDUPTHER

## 2022-09-28 RX ORDER — OXYCODONE AND ACETAMINOPHEN 10; 325 MG/1; MG/1
1 TABLET ORAL EVERY 12 HOURS
Qty: 60 TABLET | Refills: 0 | Status: SHIPPED | OUTPATIENT
Start: 2022-10-03 | End: 2022-10-24 | Stop reason: SDUPTHER

## 2022-09-28 RX ORDER — KETOROLAC TROMETHAMINE 30 MG/ML
60 INJECTION, SOLUTION INTRAMUSCULAR; INTRAVENOUS
Status: COMPLETED | OUTPATIENT
Start: 2022-09-28 | End: 2022-09-28

## 2022-09-28 RX ADMIN — KETOROLAC TROMETHAMINE 60 MG: 30 INJECTION, SOLUTION INTRAMUSCULAR; INTRAVENOUS at 10:09

## 2022-10-05 ENCOUNTER — OFFICE VISIT (OUTPATIENT)
Dept: PAIN MEDICINE | Facility: CLINIC | Age: 57
End: 2022-10-05
Payer: MEDICARE

## 2022-10-05 ENCOUNTER — HOSPITAL ENCOUNTER (OUTPATIENT)
Dept: RADIOLOGY | Facility: HOSPITAL | Age: 57
Discharge: HOME | End: 2022-10-05
Attending: PHYSICIAN ASSISTANT
Payer: MEDICARE

## 2022-10-05 VITALS
BODY MASS INDEX: 32.95 KG/M2 | WEIGHT: 205 LBS | HEIGHT: 66 IN | RESPIRATION RATE: 16 BRPM | DIASTOLIC BLOOD PRESSURE: 75 MMHG | SYSTOLIC BLOOD PRESSURE: 141 MMHG | HEART RATE: 84 BPM

## 2022-10-05 DIAGNOSIS — M54.16 LUMBAR RADICULOPATHY, CHRONIC: ICD-10-CM

## 2022-10-05 DIAGNOSIS — G89.29 CHRONIC PAIN OF RIGHT KNEE: Primary | Chronic | ICD-10-CM

## 2022-10-05 DIAGNOSIS — M89.49 OSTEOARTHROSIS MULTIPLE SITES, NOT SPECIFIED AS GENERALIZED: Chronic | ICD-10-CM

## 2022-10-05 DIAGNOSIS — M54.16 LUMBAR RADICULOPATHY, CHRONIC: Chronic | ICD-10-CM

## 2022-10-05 DIAGNOSIS — M25.561 CHRONIC PAIN OF RIGHT KNEE: Primary | Chronic | ICD-10-CM

## 2022-10-05 PROCEDURE — 73521 X-RAY EXAM HIPS BI 2 VIEWS: CPT | Mod: TC

## 2022-10-05 PROCEDURE — 4010F PR ACE/ARB THEARPY RXD/TAKEN: ICD-10-PCS | Mod: CPTII,,, | Performed by: PHYSICIAN ASSISTANT

## 2022-10-05 PROCEDURE — 3078F PR MOST RECENT DIASTOLIC BLOOD PRESSURE < 80 MM HG: ICD-10-PCS | Mod: CPTII,,, | Performed by: PHYSICIAN ASSISTANT

## 2022-10-05 PROCEDURE — 73521 XR HIPS BILATERAL 2 VIEW INCL AP PELVIS: ICD-10-PCS | Mod: 26,,, | Performed by: RADIOLOGY

## 2022-10-05 PROCEDURE — 3008F BODY MASS INDEX DOCD: CPT | Mod: CPTII,,, | Performed by: PHYSICIAN ASSISTANT

## 2022-10-05 PROCEDURE — 4010F ACE/ARB THERAPY RXD/TAKEN: CPT | Mod: CPTII,,, | Performed by: PHYSICIAN ASSISTANT

## 2022-10-05 PROCEDURE — 72148 MRI LUMBAR SPINE W/O DYE: CPT | Mod: 26,,, | Performed by: RADIOLOGY

## 2022-10-05 PROCEDURE — 1159F MED LIST DOCD IN RCRD: CPT | Mod: CPTII,,, | Performed by: PHYSICIAN ASSISTANT

## 2022-10-05 PROCEDURE — 1159F PR MEDICATION LIST DOCUMENTED IN MEDICAL RECORD: ICD-10-PCS | Mod: CPTII,,, | Performed by: PHYSICIAN ASSISTANT

## 2022-10-05 PROCEDURE — 99214 PR OFFICE/OUTPT VISIT, EST, LEVL IV, 30-39 MIN: ICD-10-PCS | Mod: S$PBB,,, | Performed by: PHYSICIAN ASSISTANT

## 2022-10-05 PROCEDURE — 3077F PR MOST RECENT SYSTOLIC BLOOD PRESSURE >= 140 MM HG: ICD-10-PCS | Mod: CPTII,,, | Performed by: PHYSICIAN ASSISTANT

## 2022-10-05 PROCEDURE — 72148 MRI LUMBAR SPINE W/O DYE: CPT | Mod: TC

## 2022-10-05 PROCEDURE — 72148 MRI LUMBAR SPINE WITHOUT CONTRAST: ICD-10-PCS | Mod: 26,,, | Performed by: RADIOLOGY

## 2022-10-05 PROCEDURE — 3008F PR BODY MASS INDEX (BMI) DOCUMENTED: ICD-10-PCS | Mod: CPTII,,, | Performed by: PHYSICIAN ASSISTANT

## 2022-10-05 PROCEDURE — 73521 X-RAY EXAM HIPS BI 2 VIEWS: CPT | Mod: 26,,, | Performed by: RADIOLOGY

## 2022-10-05 PROCEDURE — 3078F DIAST BP <80 MM HG: CPT | Mod: CPTII,,, | Performed by: PHYSICIAN ASSISTANT

## 2022-10-05 PROCEDURE — 99215 OFFICE O/P EST HI 40 MIN: CPT | Mod: PBBFAC,25 | Performed by: PHYSICIAN ASSISTANT

## 2022-10-05 PROCEDURE — 99214 OFFICE O/P EST MOD 30 MIN: CPT | Mod: S$PBB,,, | Performed by: PHYSICIAN ASSISTANT

## 2022-10-05 PROCEDURE — 3077F SYST BP >= 140 MM HG: CPT | Mod: CPTII,,, | Performed by: PHYSICIAN ASSISTANT

## 2022-10-05 RX ORDER — GABAPENTIN 400 MG/1
400 CAPSULE ORAL EVERY 12 HOURS
Qty: 60 CAPSULE | Refills: 0 | Status: CANCELLED | OUTPATIENT
Start: 2022-10-05

## 2022-10-05 RX ORDER — OXYCODONE AND ACETAMINOPHEN 10; 325 MG/1; MG/1
1 TABLET ORAL EVERY 12 HOURS
Qty: 60 TABLET | Refills: 0 | Status: CANCELLED | OUTPATIENT
Start: 2022-10-05 | End: 2022-11-04

## 2022-10-05 NOTE — PROGRESS NOTES
Subjective:         Patient ID: Audrey Causey is a 57 y.o. female.    Chief Complaint: Low-back Pain, Leg Pain (bilateral), Foot Pain (bilateral), and Wrist Pain (bilateral)      Pain  This is a chronic problem. The current episode started more than 1 year ago. The problem occurs daily. The problem has been unchanged. Associated symptoms include arthralgias and neck pain. Pertinent negatives include no anorexia, change in bowel habit, chest pain, chills, coughing, diaphoresis, fever, rash, sore throat, vertigo or vomiting.   Review of Systems   Constitutional:  Negative for activity change, appetite change, chills, diaphoresis, fever and unexpected weight change.   HENT:  Negative for drooling, ear discharge, ear pain, facial swelling, nosebleeds, sore throat, trouble swallowing, voice change and goiter.    Eyes:  Negative for photophobia, pain, discharge, redness and visual disturbance.   Respiratory:  Negative for apnea, cough, choking, chest tightness, shortness of breath, wheezing and stridor.    Cardiovascular:  Negative for chest pain, palpitations and leg swelling.   Gastrointestinal:  Negative for abdominal distention, anorexia, change in bowel habit, diarrhea, rectal pain, vomiting, fecal incontinence and change in bowel habit.   Endocrine: Negative for cold intolerance, heat intolerance, polydipsia, polyphagia and polyuria.   Genitourinary:  Negative for bladder incontinence, dysuria, flank pain, frequency and hot flashes.   Musculoskeletal:  Positive for arthralgias, back pain, leg pain, neck pain and neck stiffness.   Integumentary:  Negative for color change, pallor and rash.   Allergic/Immunologic: Negative for immunocompromised state.   Neurological:  Negative for dizziness, vertigo, seizures, syncope, facial asymmetry, speech difficulty, light-headedness, coordination difficulties, memory loss and coordination difficulties.   Hematological:  Negative for adenopathy. Does not bruise/bleed easily.    Psychiatric/Behavioral:  Negative for agitation, behavioral problems, confusion, decreased concentration, dysphoric mood, hallucinations, self-injury and suicidal ideas. The patient is not nervous/anxious and is not hyperactive.          Past Medical History:   Diagnosis Date    Acute superficial gastritis without hemorrhage 2021    Diabetes mellitus     Diverticula, colon 2021    Esophageal dysphagia 2021    GERD (gastroesophageal reflux disease)     Hypertension     Low back pain     Lumbar radiculopathy     Seizures     Sleep apnea      Past Surgical History:   Procedure Laterality Date     SECTION      COLONOSCOPY  2018    Dr. Naranjo    EPIDURAL STEROID INJECTION  2018    L4-5 MARCOS X 5 - Cook    EPIDURAL STEROID INJECTION  2017    L5-S1 MARCOS - Cook    EPIDURAL STEROID INJECTION N/A 2022    Procedure: Injection, Steroid, Epidural, L4/5;  Surgeon: Yari Soto MD;  Location: [facility];  Service: Pain Management;  Laterality: N/A;  MESSAGE LEFT ON VM TO BE TESTED ON 1-14    ESOPHAGOGASTRODUODENOSCOPY  2018    EYE SURGERY      HYSTERECTOMY      INJECTION OF FACET JOINT Bilateral 2017    Bilateral L3-S1 Facet Injection - Cook    LAPAROSCOPIC CHOLECYSTECTOMY N/A 2021    Procedure: CHOLECYSTECTOMY, LAPAROSCOPIC;  Surgeon: Amy Wagner MD;  Location: [facility] OR;  Service: General;  Laterality: N/A;  fully vaccinated    OOPHORECTOMY      TRANSFORAMINAL EPIDURAL INJECTION OF STEROID Bilateral 2022    Procedure: Injection,steroid,epidural,transforaminal approach, L4/5;  Surgeon: Yari Soto MD;  Location: [facility];  Service: Pain Management;  Laterality: Bilateral;  pt aware on ov to be tested     Social History     Socioeconomic History    Marital status:    Tobacco Use    Smoking status: Never    Smokeless tobacco: Never   Substance and Sexual Activity    Alcohol use: Never    Drug use: Never     Family  "History   Problem Relation Age of Onset    Hypertension Mother     Breast cancer Mother     Thyroid cancer Mother     Diabetes type II Mother     Stroke Mother     Heart attack Mother      Review of patient's allergies indicates:  No Known Allergies     Objective:  Vitals:    10/05/22 1444   BP: (!) 141/75   Pulse: 84   Resp: 16   Weight: 93 kg (205 lb)   Height: 5' 6" (1.676 m)   PainSc:   9         Physical Exam  Vitals and nursing note reviewed. Exam conducted with a chaperone present.   Constitutional:       General: She is awake. She is not in acute distress.     Appearance: Normal appearance. She is not ill-appearing, toxic-appearing or diaphoretic.   HENT:      Head: Normocephalic and atraumatic.      Nose: Nose normal.      Mouth/Throat:      Mouth: Mucous membranes are moist.      Pharynx: Oropharynx is clear.   Eyes:      Conjunctiva/sclera: Conjunctivae normal.      Pupils: Pupils are equal, round, and reactive to light.   Cardiovascular:      Rate and Rhythm: Normal rate.   Pulmonary:      Effort: Pulmonary effort is normal. No respiratory distress.   Abdominal:      Palpations: Abdomen is soft.      Tenderness: There is no guarding.   Musculoskeletal:         General: Normal range of motion.      Cervical back: Normal range of motion and neck supple. Tenderness present. No rigidity.      Thoracic back: Tenderness present.      Lumbar back: Tenderness present.      Right knee: Tenderness present.      Left knee: Tenderness present.   Skin:     General: Skin is warm and dry.      Coloration: Skin is not jaundiced or pale.   Neurological:      General: No focal deficit present.      Mental Status: She is alert and oriented to person, place, and time. Mental status is at baseline.      Cranial Nerves: No cranial nerve deficit (II-XII).   Psychiatric:         Mood and Affect: Mood normal.         Behavior: Behavior normal. Behavior is cooperative.         Thought Content: Thought content normal.     "     X-Ray Hips Bilateral 2 View Inc AP Pelvis  Narrative: EXAMINATION:  XR HIPS BILATERAL 2 VIEW INCL AP PELVIS    CLINICAL HISTORY:  Radiculopathy, lumbar region    COMPARISON:  None available    FINDINGS:  No evidence of fracture seen.  The alignment of the joints appears normal.  Mild bilateral hip degenerative change is present.  No soft tissue abnormality is seen.  Impression: Mild hip osteoarthrosis.    Electronically signed by: Joe Munoz  Date:    10/05/2022  Time:    14:48  MRI Lumbar Spine Without Contrast  Narrative: EXAMINATION:  MRI LUMBAR SPINE WITHOUT CONTRAST    CLINICAL HISTORY:  Radiculopathy, lumbar regionLumbar radiculopathy, symptoms persist with conservative treatment;    COMPARISON:  None    TECHNIQUE:  Multiplanar MRI imaging of the lumbar spine was performed without the use of intravenous contrast.    FINDINGS:  There is straightening of normal lumbar lordosis which may be positional or secondary to muscle spasm. There is no significant anterolisthesis or retrolisthesis.  Multilevel mild marginal vertebral body osteophyte formation.  Multilevel disc desiccation.  Lumbar vertebral body heights appear maintained.    Intervertebral disc space levels:    L1-L2: No significant disc bulge, neuroforaminal narrowing or spinal canal stenosis.    L2-L3: No significant disc bulge, neuroforaminal narrowing or spinal canal stenosis.    L3-L4: Minimal diffuse disc bulging and posterior facet/ligamentum flavum hypertrophy.  Minimal spinal canal and bilateral neural foraminal narrowing.    L4-L5: Minimal diffuse disc bulging as well as posterior facet/ligamentum flavum hypertrophy.  Mild spinal canal and bilateral neural foraminal narrowing.    L5-S1: Mild-to-moderate loss of disc space height.  Minimal diffuse disc bulging.  Mild posterior facet arthropathy.  Minimal spinal canal narrowing.  Mild bilateral neural foraminal narrowing.  Impression: Mild degenerative change of the lumbar spine with mild  spinal canal and neuroforaminal narrowing as detailed above.    Point of Service: [facility]    Electronically signed by: Sonny Clancy  Date:    10/05/2022  Time:    14:03       Office Visit on 08/03/2022   Component Date Value Ref Range Status    POC Amphetamines 08/03/2022 Negative  Negative, Inconclusive Final    POC Barbiturates 08/03/2022 Negative  Negative, Inconclusive Final    POC Benzodiazepines 08/03/2022 Negative  Negative, Inconclusive Final    POC Cocaine 08/03/2022 Negative  Negative, Inconclusive Final    POC THC 08/03/2022 Negative  Negative, Inconclusive Final    POC Methadone 08/03/2022 Negative  Negative, Inconclusive Final    POC Methamphetamine 08/03/2022 Negative  Negative, Inconclusive Final    POC Opiates 08/03/2022 Negative  Negative, Inconclusive Final    POC Oxycodone 08/03/2022 Presumptive Positive (A)  Negative, Inconclusive Final    POC Phencyclidine 08/03/2022 Negative  Negative, Inconclusive Final    POC Methylenedioxymethamphetamine * 08/03/2022 Negative  Negative, Inconclusive Final    POC Tricyclic Antidepressants 08/03/2022 Negative  Negative, Inconclusive Final    POC Buprenorphine 08/03/2022 Negative   Final     Acceptable 08/03/2022 Yes   Final    POC Temperature (Urine) 08/03/2022 92   Final   Office Visit on 04/06/2022   Component Date Value Ref Range Status    POC Amphetamines 04/06/2022 Negative  Negative, Inconclusive Final    POC Barbiturates 04/06/2022 Negative  Negative, Inconclusive Final    POC Benzodiazepines 04/06/2022 Negative  Negative, Inconclusive Final    POC Cocaine 04/06/2022 Negative  Negative, Inconclusive Final    POC THC 04/06/2022 Negative  Negative, Inconclusive Final    POC Methadone 04/06/2022 Negative  Negative, Inconclusive Final    POC Methamphetamine 04/06/2022 Negative  Negative, Inconclusive Final    POC Opiates 04/06/2022 Negative  Negative, Inconclusive Final    POC Oxycodone 04/06/2022 Presumptive Positive (A)   Negative, Inconclusive Final    POC Phencyclidine 04/06/2022 Negative  Negative, Inconclusive Final    POC Methylenedioxymethamphetamine * 04/06/2022 Negative  Negative, Inconclusive Final    POC Tricyclic Antidepressants 04/06/2022 Negative  Negative, Inconclusive Final    POC Buprenorphine 04/06/2022 Negative   Final     Acceptable 04/06/2022 Yes   Final    POC Temperature (Urine) 04/06/2022 92   Final         Orders Placed This Encounter   Procedures    Case Request Operating Room: Injection, Steroid, Epidural, L5/S1     Order Specific Question:   Medical Necessity:     Answer:   Medically Non-Urgent [100]     Order Specific Question:   CPT Code:     Answer:   OH INJ LUMBAR/SACRAL, W/IMAGING GUIDANCE [69462]     Order Specific Question:   Is an on-site pathologist required for this procedure?     Answer:   N/A         Requested Prescriptions      No prescriptions requested or ordered in this encounter       Assessment:     1. Chronic pain of right knee    2. Osteoarthrosis multiple sites, not specified as generalized    3. Lumbar radiculopathy, chronic         A's of Opioid Risk Assessment  Activity:Patient can perform ADL.   Analgesia:Patients pain is partially controlled by current medication. Patient has tried OTC medications such as Tylenol and Ibuprofen with out relief.   Adverse Effects: Patient denies constipation or sedation.  Aberrant Behavior:  reviewed with no aberrant drug seeking/taking behavior.  Overdose reversal drug naloxone discussed    Drug screen reviewed      Plan:    October 24, 2022 refill Percocet 10 1 p.o. q.8 hours  At last visit authorized patient take her medication 3 times a day this is the reason for the early refill    October 24, 2022 Labs reviewed ESR CRP elevated needs to consider rheumatologic evaluation    After pain improves will resume twice a day    She had bilateral lumbar L4/5 TFESI # 2 March 22, 2022  She states she had 80% relief after procedure she  states that procedure did help increase her level function she has had more than 3 months pain relief following her procedure    Continues complaint back pain buttock and leg pain radicular in nature     Continues complain multiple joint pain itchy dry eyes very red today    waiting for results ESR CRP ISACC     Patient considering referral to Rheumatology [facility]    Complains of back pain buttock and leg pain she is requesting lumbar procedure for her back and leg pain numbness and tingling lower extremities radicular in nature    Continues her home exercise program 3-4 days a week as directed     X-ray bilateral hips [facility] degenerative changes no fracture noted    CBC reviewed    MRI lumbar spine [facility] today's date multiple level degenerative changes bulging disc L5/S1 disc space height loss L5/S1 bulging disc L4/5    Continue current medication    Continue home exercise program as directed    Indications for this procedure for this specific patient include the following     - Injections being provided as part of a comprehensive pain management program.    - Pt has failed 6 weeks of conservative therapy including oral meds, PT and or home exercise program which has been discussed with the patient   - Injection being provided for suspected radicular pain.    - Pain scale of greater than or equal to 3/10 with functional impairment  - No evidence of local or systemic infection, bleeding tendency or unstable medical condition.    - Pain is causing significant functional limitation resulting in diminished quality of life and impaired age appropriate ADL's.   - Repeat injections are done no sooner than 7 days after the previous injection  - Epidural done for suspected radicular pain along dermatome of nerve   - Epidural done to differentiate level of radicular nerve root pain   - Repeat injections done only when pt reports 50% improvement in pain from previous injections    -increased level of  function  - patient is aware if they take any NSAIDs/anticoagulant prior to procedure procedure will be postponed, this was listed on the preop instructions and highlighted, list of NSAIDs/anticoagulant reviewed with patient to include methotrexate  - Injection done at L5/S1 level(s) which is consistent with patient's dermatomal pain complaint  The planned medically necessary  surgical procedure is performed in a hospital outpatient department and not in an ambulatory surgical center due to:     -there is no geographically assessable ambulatory surgery center that has the  necessary equipment and fluoroscopy needed for the procedure     -there is no geographically assessable ambulatory surgical center available at which the physician has privileges     -an ASC's  specific  guideline regarding the individuals weight or health conditions that prevent the use of an ASC    Monitor anesthesia request is medically indicated for the scheduled nerve block procedure due to:  1- needle phobia and anxiety, placing  the patient at risk during the provided service.  2-patient has an ASA class greater than 3 and requires constant presence of an anesthesiologist during the procedure,   3-patient has severe problems hard to lie still  4-patient suffers from chronic pain and is unable to function due to  diminished ADLs    Schedule lumbar L5/S1 MARCOS, # 3 lumbosacral radiculopathy    Dr. Soto,    Bring original prescription medication bottles/container/box with labels to each visit    Pill count    Physical therapy    Massage therapy declines

## 2022-10-05 NOTE — PATIENT INSTRUCTIONS
COVID SCREENING TO BE DONE 48-72 HOURS PRIOR TO PROCEDURE IF PT HAS NOT RECEIVED BOTH COVID VACCINATIONS OR HAS BEEN VACCINATED WITHIN THE LAST 2 WEEKS. VACCINATION CARD MUST BE PROVIDED IF PT HAS BEEN VACCINATED OR PT MUST HAVE COVID SCREENING IN ORDER TO HAVE PROCEDURE.  IF YOUR PROCEDURE IS ON A Tuesday, GET COVID TESTING ON THE Friday PRIOR TO PROCEDURE.  IF YOUR PROCEDURE IS ON A Thursday GET COVID TESTING ON THE Monday OR Tuesday PRIOR TO PROCEDURE.    IF YOUR PRIMARY CARE DOCTOR ORDERS YOUR COVID TESTING YOU MUST BRING YOUR RESULTS WITH YOU TO YOUR PROCEDURE.    Procedure Instructions:    Nothing to eat or drink for 8 hours or after midnight including gum, candy, mints, or tobacco products.  If you are scheduled for 1:30 or later nothing to eat or drink after 5 a.m. the morning of the procedure, including gum, candy, mints, or tobacco products.  Must have a  at least 18 yrs of age to stay present at all times  No Diabetic medications the morning of procedure, check blood sugar the morning of procedure, if it is greater than 200 call the office at [phone]  If you are started on antibiotics or have been prescribed antibiotics, have a fever, or have any other type of infection call to reschedule procedure.  If you take blood pressure medications you can take it at your regular scheduled time.        HOLD ASPIRIN AND ASPIRIN PRODUCTS  (ASPIRIN, BC POWDER ETC. ) FOR 7 DAYS  PRIOR TO PROCEDURE  HOLD NSAIDS( ibuprofen, mobic, meloxicam, advil, diclofenac, methotrexate, aleve etc....)  FOR 3 DAYS   PRIOR TO PROCEDURE     Hold aspirin starting on 10/24

## 2022-10-24 RX ORDER — OXYCODONE AND ACETAMINOPHEN 10; 325 MG/1; MG/1
1 TABLET ORAL EVERY 8 HOURS PRN
Qty: 90 TABLET | Refills: 0 | Status: SHIPPED | OUTPATIENT
Start: 2022-10-24 | End: 2022-11-17 | Stop reason: SDUPTHER

## 2022-10-31 ENCOUNTER — TELEPHONE (OUTPATIENT)
Dept: PAIN MEDICINE | Facility: CLINIC | Age: 57
End: 2022-10-31
Payer: MEDICARE

## 2022-10-31 NOTE — TELEPHONE ENCOUNTER
Pt is scheduled for L5-S1 MARCOS on 11-1-2022. Pt states she can not afford the $250 co pay for the procedure and wishes to be scheduled for office visit. Office visit with ROGER Cruz 11-22 at 0930, voiced understanding.

## 2022-10-31 NOTE — TELEPHONE ENCOUNTER
----- Message from Ruth Phillips sent at 10/31/2022  2:43 PM CDT -----  Regarding: R/S procedure  Pt. Wants to reschedule procedure please call [phone]

## 2022-11-17 NOTE — PROGRESS NOTES
Subjective:         Patient ID: Audrey Causey is a 57 y.o. female.    Chief Complaint: Knee Pain and Low-back Pain      Pain  This is a chronic problem. The current episode started more than 1 year ago. The problem occurs daily. The problem has been waxing and waning. Associated symptoms include arthralgias and neck pain. Pertinent negatives include no anorexia, change in bowel habit, chest pain, chills, coughing, diaphoresis, fever, rash, sore throat, vertigo or vomiting.   Review of Systems   Constitutional:  Negative for activity change, appetite change, chills, diaphoresis, fever and unexpected weight change.   HENT:  Negative for drooling, ear discharge, ear pain, facial swelling, nosebleeds, sore throat, trouble swallowing, voice change and goiter.    Eyes:  Negative for photophobia, pain, discharge, redness and visual disturbance.   Respiratory:  Negative for apnea, cough, choking, chest tightness, shortness of breath, wheezing and stridor.    Cardiovascular:  Negative for chest pain, palpitations and leg swelling.   Gastrointestinal:  Negative for abdominal distention, anorexia, change in bowel habit, diarrhea, rectal pain, vomiting, fecal incontinence and change in bowel habit.   Endocrine: Negative for cold intolerance, heat intolerance, polydipsia, polyphagia and polyuria.   Genitourinary:  Negative for bladder incontinence, dysuria, flank pain, frequency and hot flashes.   Musculoskeletal:  Positive for arthralgias, back pain, leg pain, neck pain and neck stiffness.   Integumentary:  Negative for color change, pallor and rash.   Allergic/Immunologic: Negative for immunocompromised state.   Neurological:  Negative for dizziness, vertigo, seizures, syncope, facial asymmetry, speech difficulty, light-headedness, coordination difficulties, memory loss and coordination difficulties.   Hematological:  Negative for adenopathy. Does not bruise/bleed easily.   Psychiatric/Behavioral:  Negative for agitation,  behavioral problems, confusion, decreased concentration, dysphoric mood, hallucinations, self-injury and suicidal ideas. The patient is not nervous/anxious and is not hyperactive.          Past Medical History:   Diagnosis Date    Acute superficial gastritis without hemorrhage 2021    Diabetes mellitus     Diverticula, colon 2021    Esophageal dysphagia 2021    GERD (gastroesophageal reflux disease)     Hypertension     Low back pain     Lumbar radiculopathy     Seizures     Sleep apnea      Past Surgical History:   Procedure Laterality Date     SECTION      COLONOSCOPY  2018    Dr. Naranjo    EPIDURAL STEROID INJECTION  2018    L4-5 MARCOS X 5 - Cook    EPIDURAL STEROID INJECTION  2017    L5-S1 MARCOS - Cook    EPIDURAL STEROID INJECTION N/A 2022    Procedure: Injection, Steroid, Epidural, L4/5;  Surgeon: Yari Soto MD;  Location: [facility];  Service: Pain Management;  Laterality: N/A;  MESSAGE LEFT ON VM TO BE TESTED ON 1-14    ESOPHAGOGASTRODUODENOSCOPY  2018    EYE SURGERY      HYSTERECTOMY      INJECTION OF FACET JOINT Bilateral 2017    Bilateral L3-S1 Facet Injection - Cook    LAPAROSCOPIC CHOLECYSTECTOMY N/A 2021    Procedure: CHOLECYSTECTOMY, LAPAROSCOPIC;  Surgeon: Amy Wagner MD;  Location: [facility] OR;  Service: General;  Laterality: N/A;  fully vaccinated    OOPHORECTOMY      TRANSFORAMINAL EPIDURAL INJECTION OF STEROID Bilateral 2022    Procedure: Injection,steroid,epidural,transforaminal approach, L4/5;  Surgeon: Yari Soto MD;  Location: [facility];  Service: Pain Management;  Laterality: Bilateral;  pt aware on ov to be tested     Social History     Socioeconomic History    Marital status:    Tobacco Use    Smoking status: Never    Smokeless tobacco: Never   Substance and Sexual Activity    Alcohol use: Never    Drug use: Never     Family History   Problem Relation Age of Onset    Hypertension  "Mother     Breast cancer Mother     Thyroid cancer Mother     Diabetes type II Mother     Stroke Mother     Heart attack Mother      Review of patient's allergies indicates:  No Known Allergies     Objective:  Vitals:    11/22/22 0939   BP: (!) 144/75   Pulse: 75   Resp: 16   Weight: 91.6 kg (202 lb)   Height: 5' 7" (1.702 m)   PainSc:   8         Physical Exam  Vitals and nursing note reviewed. Exam conducted with a chaperone present.   Constitutional:       General: She is awake. She is not in acute distress.     Appearance: Normal appearance. She is not ill-appearing, toxic-appearing or diaphoretic.   HENT:      Head: Normocephalic and atraumatic.      Nose: Nose normal.      Mouth/Throat:      Mouth: Mucous membranes are moist.      Pharynx: Oropharynx is clear.   Eyes:      Conjunctiva/sclera: Conjunctivae normal.      Pupils: Pupils are equal, round, and reactive to light.   Cardiovascular:      Rate and Rhythm: Normal rate.   Pulmonary:      Effort: Pulmonary effort is normal. No respiratory distress.   Abdominal:      Palpations: Abdomen is soft.      Tenderness: There is no guarding.   Musculoskeletal:         General: Normal range of motion.      Cervical back: Normal range of motion and neck supple. Tenderness present. No rigidity.      Thoracic back: Tenderness present.      Lumbar back: Tenderness present.      Right knee: Tenderness present.      Left knee: Tenderness present.   Skin:     General: Skin is warm and dry.      Coloration: Skin is not jaundiced or pale.   Neurological:      General: No focal deficit present.      Mental Status: She is alert and oriented to person, place, and time. Mental status is at baseline.      Cranial Nerves: No cranial nerve deficit (II-XII).   Psychiatric:         Mood and Affect: Mood normal.         Behavior: Behavior normal. Behavior is cooperative.         Thought Content: Thought content normal.         X-Ray Hips Bilateral 2 View Inc AP Pelvis  Narrative: " EXAMINATION:  XR HIPS BILATERAL 2 VIEW INCL AP PELVIS    CLINICAL HISTORY:  Radiculopathy, lumbar region    COMPARISON:  None available    FINDINGS:  No evidence of fracture seen.  The alignment of the joints appears normal.  Mild bilateral hip degenerative change is present.  No soft tissue abnormality is seen.  Impression: Mild hip osteoarthrosis.    Electronically signed by: [place] Tammy  Date:    10/05/2022  Time:    14:48  MRI Lumbar Spine Without Contrast  Narrative: EXAMINATION:  MRI LUMBAR SPINE WITHOUT CONTRAST    CLINICAL HISTORY:  Radiculopathy, lumbar regionLumbar radiculopathy, symptoms persist with conservative treatment;    COMPARISON:  None    TECHNIQUE:  Multiplanar MRI imaging of the lumbar spine was performed without the use of intravenous contrast.    FINDINGS:  There is straightening of normal lumbar lordosis which may be positional or secondary to muscle spasm. There is no significant anterolisthesis or retrolisthesis.  Multilevel mild marginal vertebral body osteophyte formation.  Multilevel disc desiccation.  Lumbar vertebral body heights appear maintained.    Intervertebral disc space levels:    L1-L2: No significant disc bulge, neuroforaminal narrowing or spinal canal stenosis.    L2-L3: No significant disc bulge, neuroforaminal narrowing or spinal canal stenosis.    L3-L4: Minimal diffuse disc bulging and posterior facet/ligamentum flavum hypertrophy.  Minimal spinal canal and bilateral neural foraminal narrowing.    L4-L5: Minimal diffuse disc bulging as well as posterior facet/ligamentum flavum hypertrophy.  Mild spinal canal and bilateral neural foraminal narrowing.    L5-S1: Mild-to-moderate loss of disc space height.  Minimal diffuse disc bulging.  Mild posterior facet arthropathy.  Minimal spinal canal narrowing.  Mild bilateral neural foraminal narrowing.  Impression: Mild degenerative change of the lumbar spine with mild spinal canal and neuroforaminal narrowing as detailed  above.    Point of Service: [facility]    Electronically signed by: Sonny Clancy  Date:    10/05/2022  Time:    14:03       Lab Visit on 10/05/2022   Component Date Value Ref Range Status    CRP 10/05/2022 4.48 (H)  0.00 - 0.80 mg/dL Final    ESR Westergren 10/05/2022 37 (H)  0 - 30 mm/Hr Final    ISACC Screen 10/05/2022 Negative  Negative Final    WBC 10/05/2022 5.89  4.50 - 11.00 K/uL Final    RBC 10/05/2022 4.29  4.20 - 5.40 M/uL Final    Hemoglobin 10/05/2022 12.3  12.0 - 16.0 g/dL Final    Hematocrit 10/05/2022 37.4 (L)  38.0 - 47.0 % Final    MCV 10/05/2022 87.2  80.0 - 96.0 fL Final    MCH 10/05/2022 28.7  27.0 - 31.0 pg Final    MCHC 10/05/2022 32.9  32.0 - 36.0 g/dL Final    RDW 10/05/2022 13.9  11.5 - 14.5 % Final    Platelet Count 10/05/2022 357  150 - 400 K/uL Final    MPV 10/05/2022 9.6  9.4 - 12.4 fL Final    Neutrophils % 10/05/2022 54.7  53.0 - 65.0 % Final    Lymphocytes % 10/05/2022 36.5  27.0 - 41.0 % Final    Monocytes % 10/05/2022 6.1 (H)  2.0 - 6.0 % Final    Eosinophils % 10/05/2022 2.2  1.0 - 4.0 % Final    Basophils % 10/05/2022 0.3  0.0 - 1.0 % Final    Immature Granulocytes % 10/05/2022 0.2  0.0 - 0.4 % Final    nRBC, Auto 10/05/2022 0.0  <=0.0 % Final    Neutrophils, Abs 10/05/2022 3.22  1.80 - 7.70 K/uL Final    Lymphocytes, Absolute 10/05/2022 2.15  1.00 - 4.80 K/uL Final    Monocytes, Absolute 10/05/2022 0.36  0.00 - 0.80 K/uL Final    Eosinophils, Absolute 10/05/2022 0.13  0.00 - 0.50 K/uL Final    Basophils, Absolute 10/05/2022 0.02  0.00 - 0.20 K/uL Final    Immature Granulocytes, Absolute 10/05/2022 0.01  0.00 - 0.04 K/uL Final    nRBC, Absolute 10/05/2022 0.00  <=0.00 x10e3/uL Final    Diff Type 10/05/2022 Auto   Final   Office Visit on 08/03/2022   Component Date Value Ref Range Status    POC Amphetamines 08/03/2022 Negative  Negative, Inconclusive Final    POC Barbiturates 08/03/2022 Negative  Negative, Inconclusive Final    POC Benzodiazepines 08/03/2022 Negative   Negative, Inconclusive Final    POC Cocaine 08/03/2022 Negative  Negative, Inconclusive Final    POC THC 08/03/2022 Negative  Negative, Inconclusive Final    POC Methadone 08/03/2022 Negative  Negative, Inconclusive Final    POC Methamphetamine 08/03/2022 Negative  Negative, Inconclusive Final    POC Opiates 08/03/2022 Negative  Negative, Inconclusive Final    POC Oxycodone 08/03/2022 Presumptive Positive (A)  Negative, Inconclusive Final    POC Phencyclidine 08/03/2022 Negative  Negative, Inconclusive Final    POC Methylenedioxymethamphetamine * 08/03/2022 Negative  Negative, Inconclusive Final    POC Tricyclic Antidepressants 08/03/2022 Negative  Negative, Inconclusive Final    POC Buprenorphine 08/03/2022 Negative   Final     Acceptable 08/03/2022 Yes   Final    POC Temperature (Urine) 08/03/2022 92   Final         Orders Placed This Encounter   Procedures    Quantiferon Gold TB     Standing Status:   Future     Standing Expiration Date:   1/21/2024    CBC Auto Differential     Standing Status:   Future     Standing Expiration Date:   2/22/2023    Comprehensive Metabolic Panel     Standing Status:   Future     Standing Expiration Date:   2/22/2023    Rheumatoid Quantitative     Standing Status:   Future     Standing Expiration Date:   2/22/2023    HLA-B27 antigen     Standing Status:   Future     Standing Expiration Date:   2/22/2023    C-Reactive Protein     Standing Status:   Future     Standing Expiration Date:   2/22/2023    Sedimentation Rate     Standing Status:   Future     Standing Expiration Date:   2/22/2023    Treponema Pallidum (Syphillis) Antibody     Standing Status:   Future     Standing Expiration Date:   2/22/2023    T4, Free     Standing Status:   Future     Standing Expiration Date:   2/22/2023    TSH     Standing Status:   Future     Standing Expiration Date:   2/22/2023    Lupus Anticoagulant Eval w/Reflex     Standing Status:   Future     Standing Expiration Date:   2/22/2023     Vitamin D     Standing Status:   Future     Standing Expiration Date:   2/22/2023    Hepatitis C Antibody     Standing Status:   Future     Standing Expiration Date:   2/22/2023     Order Specific Question:   Release to patient     Answer:   Immediate    Uric Acid     Standing Status:   Future     Standing Expiration Date:   2/22/2023    Antistreptolysin O (ASO)     Standing Status:   Future     Standing Expiration Date:   2/22/2023    Anti-DNA Ab, Double-Stranded     Standing Status:   Future     Standing Expiration Date:   2/22/2023    West Nile Antibodies, IgG and IgM     Standing Status:   Future     Standing Expiration Date:   2/22/2023    C3 Complement     Standing Status:   Future     Standing Expiration Date:   1/22/2023    C4 Complement     Standing Status:   Future     Standing Expiration Date:   1/22/2023    Complement, Total     Standing Status:   Future     Standing Expiration Date:   1/22/2023    Cyclic Citrullinated Peptide Antibody, IgG     Standing Status:   Future     Standing Expiration Date:   1/21/2024    Ehrlichia Antibody Panel     Standing Status:   Future     Standing Expiration Date:   2/22/2023    Spotted Fever Group Antibodies     Standing Status:   Future     Standing Expiration Date:   2/22/2023    Lyme Antibody w/ Immunoblot Reflex     Standing Status:   Future     Standing Expiration Date:   2/22/2023    CK     Standing Status:   Future     Standing Expiration Date:   2/22/2023    Ambulatory referral/consult to Rheumatology     Standing Status:   Future     Standing Expiration Date:   12/22/2023     Referral Priority:   Routine     Referral Type:   Consultation     Referral Reason:   Specialty Services Required     Referred to Provider:   Mary Jordan MD     Requested Specialty:   Rheumatology     Number of Visits Requested:   1    POCT Urine Drug Screen Presump     Interpretive Information:     Negative:  No drug detected at the cut off level.   Positive:  This result  represents presumptive positive for the   tested drug, other substances may yield a positive response other   than the analyte of interest. This result should be utilized for   diagnostic purpose only. Confirmation testing will be performed upon physician request only.            Requested Prescriptions     Signed Prescriptions Disp Refills    gabapentin (NEURONTIN) 400 MG capsule 60 capsule 1     Sig: Take 1 capsule (400 mg total) by mouth every 12 (twelve) hours.    oxyCODONE-acetaminophen (PERCOCET)  mg per tablet 90 tablet 0     Sig: Take 1 tablet by mouth every 8 (eight) hours as needed for Pain.    oxyCODONE-acetaminophen (PERCOCET)  mg per tablet 90 tablet 0     Sig: Take 1 tablet by mouth every 8 (eight) hours as needed for Pain.    naloxone (NARCAN) 4 mg/actuation Spry 1 each 0     Si spray (4 mg total) by Nasal route once. for 1 dose       Assessment:     1. Lumbar radiculopathy, chronic    2. Chronic pain of right knee    3. Osteoarthrosis multiple sites, not specified as generalized    4. Encounter for long-term (current) use of other medications    5. Polyarthralgia    6. Myalgia    7. Vitamin D deficiency    8. Chronic inflammatory arthritis         A's of Opioid Risk Assessment  Activity:Patient can perform ADL.   Analgesia:Patients pain is partially controlled by current medication. Patient has tried OTC medications such as Tylenol and Ibuprofen with out relief.   Adverse Effects: Patient denies constipation or sedation.  Aberrant Behavior:  reviewed with no aberrant drug seeking/taking behavior.  Overdose reversal drug naloxone discussed    Drug screen reviewed      Plan:    2022 ESR elevated pending rheumatology evaluation     Vitamin-D level 23, will start vitamin-D replacement patient notify    Lumbar procedure L5/S1 MARCOS 3.  Was postponed due to inability to afford co-pay     2022 Labs reviewed ESR CRP elevated she is requesting further evaluation      Requesting referral rheumatology     Will draw labs inflammatory arthropathies chronic pain chronic joint pain inflammatory arthropathies tick bite panel    After pain improves will resume twice a day    She had bilateral lumbar L4/5 TFESI # 2 March 22, 2022  She states she had 80% relief after procedure she states that procedure did help increase her level function she has had more than 3 months pain relief following her procedure    Continues complaint back pain buttock and leg pain radicular in nature     Continues complain multiple joint pain itchy dry eyes very red today    Labs inflammatory arthropathies tick bite panel polyarthralgia     Referral rheumatology please evaluate treat     X-ray bilateral hips [facility] degenerative changes no fracture noted    MRI lumbar spine [facility] today's date multiple level degenerative changes bulging disc L5/S1 disc space height loss L5/S1 bulging disc L4/5    Continue current medication    Continue home exercise program as directed    Follow-up 2 months    Dr. Soto, March 2023    Bring original prescription medication bottles/container/box with labels to each visit    Pill count    Physical therapy    Massage therapy declines

## 2022-11-22 ENCOUNTER — OFFICE VISIT (OUTPATIENT)
Dept: PAIN MEDICINE | Facility: CLINIC | Age: 57
End: 2022-11-22
Payer: MEDICARE

## 2022-11-22 VITALS
BODY MASS INDEX: 31.71 KG/M2 | HEIGHT: 67 IN | SYSTOLIC BLOOD PRESSURE: 144 MMHG | RESPIRATION RATE: 16 BRPM | HEART RATE: 75 BPM | WEIGHT: 202 LBS | DIASTOLIC BLOOD PRESSURE: 75 MMHG

## 2022-11-22 DIAGNOSIS — M89.49 OSTEOARTHROSIS MULTIPLE SITES, NOT SPECIFIED AS GENERALIZED: Chronic | ICD-10-CM

## 2022-11-22 DIAGNOSIS — M54.16 LUMBAR RADICULOPATHY, CHRONIC: Primary | Chronic | ICD-10-CM

## 2022-11-22 DIAGNOSIS — M25.561 CHRONIC PAIN OF RIGHT KNEE: Chronic | ICD-10-CM

## 2022-11-22 DIAGNOSIS — M19.90 CHRONIC INFLAMMATORY ARTHRITIS: ICD-10-CM

## 2022-11-22 DIAGNOSIS — G89.29 CHRONIC PAIN OF RIGHT KNEE: Chronic | ICD-10-CM

## 2022-11-22 DIAGNOSIS — E55.9 VITAMIN D DEFICIENCY: Chronic | ICD-10-CM

## 2022-11-22 DIAGNOSIS — M79.10 MYALGIA: Chronic | ICD-10-CM

## 2022-11-22 DIAGNOSIS — M25.50 POLYARTHRALGIA: Chronic | ICD-10-CM

## 2022-11-22 DIAGNOSIS — Z79.899 ENCOUNTER FOR LONG-TERM (CURRENT) USE OF OTHER MEDICATIONS: ICD-10-CM

## 2022-11-22 LAB
CTP QC/QA: YES
POC (AMP) AMPHETAMINE: NEGATIVE
POC (BAR) BARBITURATES: NEGATIVE
POC (BUP) BUPRENORPHINE: NEGATIVE
POC (BZO) BENZODIAZEPINES: NEGATIVE
POC (COC) COCAINE: NEGATIVE
POC (MDMA) METHYLENEDIOXYMETHAMPHETAMINE 3,4: NEGATIVE
POC (MET) METHAMPHETAMINE: NEGATIVE
POC (MOP) OPIATES: NEGATIVE
POC (MTD) METHADONE: NEGATIVE
POC (OXY) OXYCODONE: ABNORMAL
POC (PCP) PHENCYCLIDINE: NEGATIVE
POC (TCA) TRICYCLIC ANTIDEPRESSANTS: NEGATIVE
POC TEMPERATURE (URINE): ABNORMAL
POC THC: NEGATIVE

## 2022-11-22 PROCEDURE — 1159F MED LIST DOCD IN RCRD: CPT | Mod: CPTII,,, | Performed by: PHYSICIAN ASSISTANT

## 2022-11-22 PROCEDURE — 99215 OFFICE O/P EST HI 40 MIN: CPT | Mod: PBBFAC | Performed by: PHYSICIAN ASSISTANT

## 2022-11-22 PROCEDURE — 4010F PR ACE/ARB THEARPY RXD/TAKEN: ICD-10-PCS | Mod: CPTII,,, | Performed by: PHYSICIAN ASSISTANT

## 2022-11-22 PROCEDURE — 3008F PR BODY MASS INDEX (BMI) DOCUMENTED: ICD-10-PCS | Mod: CPTII,,, | Performed by: PHYSICIAN ASSISTANT

## 2022-11-22 PROCEDURE — 3077F PR MOST RECENT SYSTOLIC BLOOD PRESSURE >= 140 MM HG: ICD-10-PCS | Mod: CPTII,,, | Performed by: PHYSICIAN ASSISTANT

## 2022-11-22 PROCEDURE — 99214 PR OFFICE/OUTPT VISIT, EST, LEVL IV, 30-39 MIN: ICD-10-PCS | Mod: S$PBB,,, | Performed by: PHYSICIAN ASSISTANT

## 2022-11-22 PROCEDURE — 1159F PR MEDICATION LIST DOCUMENTED IN MEDICAL RECORD: ICD-10-PCS | Mod: CPTII,,, | Performed by: PHYSICIAN ASSISTANT

## 2022-11-22 PROCEDURE — 3008F BODY MASS INDEX DOCD: CPT | Mod: CPTII,,, | Performed by: PHYSICIAN ASSISTANT

## 2022-11-22 PROCEDURE — 3078F DIAST BP <80 MM HG: CPT | Mod: CPTII,,, | Performed by: PHYSICIAN ASSISTANT

## 2022-11-22 PROCEDURE — 80305 DRUG TEST PRSMV DIR OPT OBS: CPT | Mod: PBBFAC | Performed by: PHYSICIAN ASSISTANT

## 2022-11-22 PROCEDURE — 3077F SYST BP >= 140 MM HG: CPT | Mod: CPTII,,, | Performed by: PHYSICIAN ASSISTANT

## 2022-11-22 PROCEDURE — 4010F ACE/ARB THERAPY RXD/TAKEN: CPT | Mod: CPTII,,, | Performed by: PHYSICIAN ASSISTANT

## 2022-11-22 PROCEDURE — 99214 OFFICE O/P EST MOD 30 MIN: CPT | Mod: S$PBB,,, | Performed by: PHYSICIAN ASSISTANT

## 2022-11-22 PROCEDURE — 3078F PR MOST RECENT DIASTOLIC BLOOD PRESSURE < 80 MM HG: ICD-10-PCS | Mod: CPTII,,, | Performed by: PHYSICIAN ASSISTANT

## 2022-11-22 RX ORDER — OXYCODONE AND ACETAMINOPHEN 10; 325 MG/1; MG/1
1 TABLET ORAL EVERY 8 HOURS PRN
Qty: 90 TABLET | Refills: 0 | Status: SHIPPED | OUTPATIENT
Start: 2022-11-23 | End: 2023-01-18 | Stop reason: SDUPTHER

## 2022-11-22 RX ORDER — OXYCODONE AND ACETAMINOPHEN 10; 325 MG/1; MG/1
1 TABLET ORAL EVERY 8 HOURS PRN
Qty: 90 TABLET | Refills: 0 | Status: SHIPPED | OUTPATIENT
Start: 2022-12-23 | End: 2023-01-18 | Stop reason: SDUPTHER

## 2022-11-22 RX ORDER — NALOXONE HYDROCHLORIDE 4 MG/.1ML
1 SPRAY NASAL ONCE
Qty: 1 EACH | Refills: 0 | Status: SHIPPED | OUTPATIENT
Start: 2022-11-22 | End: 2022-11-22

## 2022-11-22 RX ORDER — GABAPENTIN 400 MG/1
400 CAPSULE ORAL EVERY 12 HOURS
Qty: 60 CAPSULE | Refills: 1 | Status: SHIPPED | OUTPATIENT
Start: 2022-11-22 | End: 2023-01-18 | Stop reason: SDUPTHER

## 2022-11-23 ENCOUNTER — TELEPHONE (OUTPATIENT)
Dept: PAIN MEDICINE | Facility: CLINIC | Age: 57
End: 2022-11-23
Payer: MEDICARE

## 2022-11-23 RX ORDER — ERGOCALCIFEROL 1.25 MG/1
50000 CAPSULE ORAL
Qty: 8 CAPSULE | Refills: 0 | Status: SHIPPED | OUTPATIENT
Start: 2022-11-23 | End: 2023-01-12

## 2022-11-23 NOTE — TELEPHONE ENCOUNTER
ATA BHARDWAJ   11/23/2022   9:02 AM   Vit D low D. Shows PA called in prescription. Attempted to call patient no answer left message to call back.  Spoke with Ms Benitez informed Vit -D low and prescription called in to pharmacy. Take 1 capsule a week for 8 weeks. Voiced understanding

## 2022-12-22 ENCOUNTER — OFFICE VISIT (OUTPATIENT)
Dept: OTOLARYNGOLOGY | Facility: CLINIC | Age: 57
End: 2022-12-22
Payer: MEDICARE

## 2022-12-22 VITALS — WEIGHT: 202 LBS | BODY MASS INDEX: 31.71 KG/M2 | HEIGHT: 67 IN

## 2022-12-22 DIAGNOSIS — H92.03 OTALGIA, BILATERAL: ICD-10-CM

## 2022-12-22 DIAGNOSIS — L91.0 KELOID OF SKIN: Primary | ICD-10-CM

## 2022-12-22 PROCEDURE — 3008F PR BODY MASS INDEX (BMI) DOCUMENTED: ICD-10-PCS | Mod: CPTII,,, | Performed by: OTOLARYNGOLOGY

## 2022-12-22 PROCEDURE — 3008F BODY MASS INDEX DOCD: CPT | Mod: CPTII,,, | Performed by: OTOLARYNGOLOGY

## 2022-12-22 PROCEDURE — 99204 PR OFFICE/OUTPT VISIT, NEW, LEVL IV, 45-59 MIN: ICD-10-PCS | Mod: S$PBB,,, | Performed by: OTOLARYNGOLOGY

## 2022-12-22 PROCEDURE — 99204 OFFICE O/P NEW MOD 45 MIN: CPT | Mod: S$PBB,,, | Performed by: OTOLARYNGOLOGY

## 2022-12-22 PROCEDURE — 4010F ACE/ARB THERAPY RXD/TAKEN: CPT | Mod: CPTII,,, | Performed by: OTOLARYNGOLOGY

## 2022-12-22 PROCEDURE — 1159F MED LIST DOCD IN RCRD: CPT | Mod: CPTII,,, | Performed by: OTOLARYNGOLOGY

## 2022-12-22 PROCEDURE — 99215 OFFICE O/P EST HI 40 MIN: CPT | Mod: PBBFAC | Performed by: OTOLARYNGOLOGY

## 2022-12-22 PROCEDURE — 1160F PR REVIEW ALL MEDS BY PRESCRIBER/CLIN PHARMACIST DOCUMENTED: ICD-10-PCS | Mod: CPTII,,, | Performed by: OTOLARYNGOLOGY

## 2022-12-22 PROCEDURE — 1160F RVW MEDS BY RX/DR IN RCRD: CPT | Mod: CPTII,,, | Performed by: OTOLARYNGOLOGY

## 2022-12-22 PROCEDURE — 1159F PR MEDICATION LIST DOCUMENTED IN MEDICAL RECORD: ICD-10-PCS | Mod: CPTII,,, | Performed by: OTOLARYNGOLOGY

## 2022-12-22 PROCEDURE — 4010F PR ACE/ARB THEARPY RXD/TAKEN: ICD-10-PCS | Mod: CPTII,,, | Performed by: OTOLARYNGOLOGY

## 2022-12-22 NOTE — PROGRESS NOTES
Subjective:       Patient ID: Audrey Causey is a 57 y.o. female.    Chief Complaint: Other (Patient presents for a knot on both ear lobes. )  Been growing for several months  HPI  Review of Systems   HENT:  Positive for ear pain.    All other systems reviewed and are negative.    Objective:      Physical Exam  General: NAD  Head: Normocephalic, atraumatic, no facial asymmetry/normal strength,  Ears: Both auricules large keloids in lobules , w/o deformities tympanic membranes normal external auditory canals normal  Nose: External nose w/o deformities normal turbinates no drainage or inflammation  Oral Cavity: Lips, gums, floor of mouth, tongue hard palate, and buccal mucosa without mass/lesion  Oropharynx: Mucosa pink and moist, soft palate, posterior pharynx and oropharyngeal wall without mass/lesion  Neck: Supple, symmetric, trachea midline, no palpable mass/lesion, no palpable cervical lymphadenopathy  Skin: Warm and dry, no concerning lesions  Respiratory: Respirations even, unlabored   Assessment:       1. Keloid of skin    2. Otalgia, bilateral          Plan:       Will excise in OR

## 2023-01-13 ENCOUNTER — TELEPHONE (OUTPATIENT)
Dept: OTOLARYNGOLOGY | Facility: CLINIC | Age: 58
End: 2023-01-13
Payer: MEDICARE

## 2023-01-17 ENCOUNTER — ANESTHESIA EVENT (OUTPATIENT)
Dept: SURGERY | Facility: HOSPITAL | Age: 58
End: 2023-01-17
Payer: MEDICARE

## 2023-01-17 ENCOUNTER — ANESTHESIA (OUTPATIENT)
Dept: SURGERY | Facility: HOSPITAL | Age: 58
End: 2023-01-17
Payer: MEDICARE

## 2023-01-17 ENCOUNTER — HOSPITAL ENCOUNTER (OUTPATIENT)
Facility: HOSPITAL | Age: 58
Discharge: HOME | End: 2023-01-17
Attending: OTOLARYNGOLOGY | Admitting: OTOLARYNGOLOGY
Payer: MEDICARE

## 2023-01-17 VITALS
TEMPERATURE: 98 F | OXYGEN SATURATION: 95 % | HEART RATE: 62 BPM | SYSTOLIC BLOOD PRESSURE: 140 MMHG | RESPIRATION RATE: 16 BRPM | DIASTOLIC BLOOD PRESSURE: 83 MMHG | BODY MASS INDEX: 31.23 KG/M2 | WEIGHT: 199 LBS | HEIGHT: 67 IN

## 2023-01-17 DIAGNOSIS — L91.0 KELOID OF SKIN: Primary | ICD-10-CM

## 2023-01-17 LAB
GLUCOSE SERPL-MCNC: 146 MG/DL (ref 70–105)
GLUCOSE SERPL-MCNC: 159 MG/DL (ref 70–105)

## 2023-01-17 PROCEDURE — 11443 PR EXC SKIN BENIG 2.1-3 CM FACE,FACIAL: ICD-10-PCS | Mod: 51,,, | Performed by: OTOLARYNGOLOGY

## 2023-01-17 PROCEDURE — 11443 EXC FACE-MM B9+MARG 2.1-3 CM: CPT | Mod: 51,,, | Performed by: OTOLARYNGOLOGY

## 2023-01-17 PROCEDURE — 71000015 HC POSTOP RECOV 1ST HR: Performed by: OTOLARYNGOLOGY

## 2023-01-17 PROCEDURE — 88305 TISSUE EXAM BY PATHOLOGIST: CPT | Mod: 26,,, | Performed by: PATHOLOGY

## 2023-01-17 PROCEDURE — 11446 PR EXC SKIN BENIG >4 CM FACE,FACIAL: ICD-10-PCS | Mod: ,,, | Performed by: OTOLARYNGOLOGY

## 2023-01-17 PROCEDURE — 88305 TISSUE EXAM BY PATHOLOGIST: CPT | Mod: TC,59,SUR | Performed by: OTOLARYNGOLOGY

## 2023-01-17 PROCEDURE — 88305 SURGICAL PATHOLOGY: ICD-10-PCS | Mod: 26,,, | Performed by: PATHOLOGY

## 2023-01-17 PROCEDURE — 71000033 HC RECOVERY, INTIAL HOUR: Performed by: OTOLARYNGOLOGY

## 2023-01-17 PROCEDURE — 37000009 HC ANESTHESIA EA ADD 15 MINS: Performed by: OTOLARYNGOLOGY

## 2023-01-17 PROCEDURE — 63600175 PHARM REV CODE 636 W HCPCS: Performed by: NURSE ANESTHETIST, CERTIFIED REGISTERED

## 2023-01-17 PROCEDURE — 36000707: Performed by: OTOLARYNGOLOGY

## 2023-01-17 PROCEDURE — 25000003 PHARM REV CODE 250: Performed by: NURSE ANESTHETIST, CERTIFIED REGISTERED

## 2023-01-17 PROCEDURE — D9220A PRA ANESTHESIA: ICD-10-PCS | Mod: ANES,,, | Performed by: ANESTHESIOLOGY

## 2023-01-17 PROCEDURE — 36000706: Performed by: OTOLARYNGOLOGY

## 2023-01-17 PROCEDURE — D9220A PRA ANESTHESIA: ICD-10-PCS | Mod: CRNA,,, | Performed by: NURSE ANESTHETIST, CERTIFIED REGISTERED

## 2023-01-17 PROCEDURE — 27000510 HC BLANKET BAIR HUGGER ANY SIZE: Performed by: ANESTHESIOLOGY

## 2023-01-17 PROCEDURE — 27000177 HC AIRWAY, LARYNGEAL MASK: Performed by: ANESTHESIOLOGY

## 2023-01-17 PROCEDURE — 37000008 HC ANESTHESIA 1ST 15 MINUTES: Performed by: OTOLARYNGOLOGY

## 2023-01-17 PROCEDURE — 27000716 HC OXISENSOR PROBE, ANY SIZE: Performed by: ANESTHESIOLOGY

## 2023-01-17 PROCEDURE — D9220A PRA ANESTHESIA: Mod: CRNA,,, | Performed by: NURSE ANESTHETIST, CERTIFIED REGISTERED

## 2023-01-17 PROCEDURE — 11446 EXC FACE-MM B9+MARG >4 CM: CPT | Mod: ,,, | Performed by: OTOLARYNGOLOGY

## 2023-01-17 PROCEDURE — 25000003 PHARM REV CODE 250

## 2023-01-17 PROCEDURE — D9220A PRA ANESTHESIA: Mod: ANES,,, | Performed by: ANESTHESIOLOGY

## 2023-01-17 PROCEDURE — 82962 GLUCOSE BLOOD TEST: CPT

## 2023-01-17 RX ORDER — LIDOCAINE HYDROCHLORIDE 20 MG/ML
INJECTION, SOLUTION EPIDURAL; INFILTRATION; INTRACAUDAL; PERINEURAL
Status: DISCONTINUED | OUTPATIENT
Start: 2023-01-17 | End: 2023-01-17

## 2023-01-17 RX ORDER — HYDROMORPHONE HYDROCHLORIDE 2 MG/ML
0.5 INJECTION, SOLUTION INTRAMUSCULAR; INTRAVENOUS; SUBCUTANEOUS EVERY 5 MIN PRN
Status: DISCONTINUED | OUTPATIENT
Start: 2023-01-17 | End: 2023-01-17 | Stop reason: HOSPADM

## 2023-01-17 RX ORDER — OXYCODONE HYDROCHLORIDE 5 MG/1
5 TABLET ORAL
Status: DISCONTINUED | OUTPATIENT
Start: 2023-01-17 | End: 2023-01-17 | Stop reason: HOSPADM

## 2023-01-17 RX ORDER — MEPERIDINE HYDROCHLORIDE 25 MG/ML
25 INJECTION INTRAMUSCULAR; INTRAVENOUS; SUBCUTANEOUS EVERY 10 MIN PRN
Status: DISCONTINUED | OUTPATIENT
Start: 2023-01-17 | End: 2023-01-17 | Stop reason: HOSPADM

## 2023-01-17 RX ORDER — PROPOFOL 10 MG/ML
VIAL (ML) INTRAVENOUS
Status: DISCONTINUED | OUTPATIENT
Start: 2023-01-17 | End: 2023-01-17

## 2023-01-17 RX ORDER — MORPHINE SULFATE 10 MG/ML
4 INJECTION INTRAMUSCULAR; INTRAVENOUS; SUBCUTANEOUS EVERY 5 MIN PRN
Status: DISCONTINUED | OUTPATIENT
Start: 2023-01-17 | End: 2023-01-17 | Stop reason: HOSPADM

## 2023-01-17 RX ORDER — DIPHENHYDRAMINE HYDROCHLORIDE 50 MG/ML
25 INJECTION INTRAMUSCULAR; INTRAVENOUS EVERY 6 HOURS PRN
Status: DISCONTINUED | OUTPATIENT
Start: 2023-01-17 | End: 2023-01-17 | Stop reason: HOSPADM

## 2023-01-17 RX ORDER — FENTANYL CITRATE 50 UG/ML
INJECTION, SOLUTION INTRAMUSCULAR; INTRAVENOUS
Status: DISCONTINUED | OUTPATIENT
Start: 2023-01-17 | End: 2023-01-17

## 2023-01-17 RX ORDER — SODIUM CHLORIDE, SODIUM LACTATE, POTASSIUM CHLORIDE, CALCIUM CHLORIDE 600; 310; 30; 20 MG/100ML; MG/100ML; MG/100ML; MG/100ML
125 INJECTION, SOLUTION INTRAVENOUS CONTINUOUS
Status: DISCONTINUED | OUTPATIENT
Start: 2023-01-17 | End: 2023-01-17 | Stop reason: HOSPADM

## 2023-01-17 RX ORDER — LIDOCAINE HYDROCHLORIDE AND EPINEPHRINE 5; 5 MG/ML; UG/ML
INJECTION, SOLUTION INFILTRATION; PERINEURAL
Status: DISCONTINUED | OUTPATIENT
Start: 2023-01-17 | End: 2023-01-17 | Stop reason: HOSPADM

## 2023-01-17 RX ORDER — ONDANSETRON 2 MG/ML
4 INJECTION INTRAMUSCULAR; INTRAVENOUS DAILY PRN
Status: DISCONTINUED | OUTPATIENT
Start: 2023-01-17 | End: 2023-01-17 | Stop reason: HOSPADM

## 2023-01-17 RX ADMIN — LIDOCAINE HYDROCHLORIDE 100 MG: 20 INJECTION, SOLUTION EPIDURAL; INFILTRATION; INTRACAUDAL; PERINEURAL at 08:01

## 2023-01-17 RX ADMIN — SODIUM CHLORIDE: 9 INJECTION, SOLUTION INTRAVENOUS at 08:01

## 2023-01-17 RX ADMIN — FENTANYL CITRATE 100 MCG: 50 INJECTION INTRAMUSCULAR; INTRAVENOUS at 08:01

## 2023-01-17 RX ADMIN — PROPOFOL 150 MG: 10 INJECTION, EMULSION INTRAVENOUS at 08:01

## 2023-01-17 NOTE — DISCHARGE INSTRUCTIONS
Keep earlobes clean and dry.  Call Doctor if any signs and symptoms infection fever greater than 100.4, redness swelling, or drainage.   May shower tomorrow.

## 2023-01-17 NOTE — BRIEF OP NOTE
[facility] - Orthopedic Periop Services  Brief Operative Note    Surgery Date: 1/17/2023     Surgeon(s) and Role:     * Moreno Xiong MD - Primary    Assisting Surgeon: None    Pre-op Diagnosis:  Keloid of skin [L91.0]    Post-op Diagnosis:  Post-Op Diagnosis Codes:     * Keloid of skin [L91.0]    Procedure(s) (LRB):  EXCISION, KELOID ON BILATERAL EARLOBES (Bilateral)    Anesthesia: General    Operative Findings: Keloids    Estimated Blood Loss: 5 mL         Specimens:   Specimen (24h ago, onward)       Start     Ordered    01/17/23 0853  Surgical Pathology  RELEASE UPON ORDERING         01/17/23 0853                      Discharge Note    OUTCOME: Patient tolerated treatment/procedure well without complication and is now ready for discharge.    DISPOSITION: Home or Self Care    FINAL DIAGNOSIS: keloids  FOLLOWUP: In clinic    DISCHARGE INSTRUCTIONS:  No discharge procedures on file.

## 2023-01-17 NOTE — OP NOTE
Surgeon(s) and Role:     * Moreno Xiong MD - Primary    Assisting Surgeon: None    Pre-op Diagnosis:  Keloid of skin [L91.0]    Post-op Diagnosis:  Post-Op Diagnosis Codes:     * Keloid of skin [L91.0]    Procedure(s) (LRB):  EXCISION, KELOID ON BILATERAL EARLOBES (Bilateral)  After LMA anesthesia the lesion was prepped and draped in a sterile fashion. It was infiltrated with local 2% xylocaine with epi 1:100,000 around 2 cc's  The elongated 2.5 cm keloid  was excised Hemostasis was meticulously obtained it was closed  with 4 O nylon  the patient was flipped and similar procedure was carried out on the opposite side In total @ 5 cm benign keloid tissue was excised The patient  tolerated procedure well was taken to RR in stable condition   Anesthesia: General    Operative Findings: Keloids    Estimated Blood Loss: 5 mL

## 2023-01-17 NOTE — OR NURSING
0905 PT TO PACU ASLEEP, AROUSABLE TO VOICE. RESP EVEN AND UNLABORED. IV INFUSING TO L HAND. INCISION TO BILAT EARS C/D/I. DERMABOND IN PLACE WITH NO BLEEDING NOTED. VSS. SAFETY MEASURES IN PLACE.    0920 PT RESTING QUIETLY. NO DISTRESS NOTED. VSS. PT DENIES PAIN. INCISION TO BILAT EARS C/D/I. NO BLEEDING NOTED.     0935 OUT OF PACU    0938 PT TRANSFERRED TO ASC 24. RELEASED TO FELICE MCBRIDE RN. PT DROWSY, AROUSABLE TO VOICE. RESP EVEN AND UNLABORED. IV INFUSING TO L HAND. INCISION TO BILAT EARS C/D/I. DERMABOND IN PLACE WITH NO BLEEDING NOTED. VS: BP 27536, HR 67, RR 14, SPO2 95% ON RA. SAFETY MEASURES IN PLACE. FAMILY AT BEDSIDE.

## 2023-01-17 NOTE — TRANSFER OF CARE
"Anesthesia Transfer of Care Note    Patient: Audrey Causey    Procedure(s) Performed: Procedure(s) (LRB):  EXCISION, KELOID ON BILATERAL EARLOBES (Bilateral)    Patient location: PACU    Anesthesia Type: general    Transport from OR: Transported from OR on 100% O2 by closed face mask with adequate spontaneous ventilation    Post pain: adequate analgesia    Post assessment: no apparent anesthetic complications    Post vital signs: stable    Level of consciousness: responds to stimulation    Nausea/Vomiting: no nausea/vomiting    Complications: none    Transfer of care protocol was followed      Last vitals:   Visit Vitals  /83 (BP Location: Right arm, Patient Position: Lying)   Pulse 91   Temp 37 °C (98.6 °F)   Resp 18   Ht 5' 7" (1.702 m)   Wt 90.3 kg (199 lb)   SpO2 (!) 94%   Breastfeeding No   BMI 31.17 kg/m²     "

## 2023-01-17 NOTE — ANESTHESIA PREPROCEDURE EVALUATION
01/17/2023  Audrey Causey is a 57 y.o., female.      Pre-op Assessment    I have reviewed the Patient Summary Reports.     I have reviewed the Nursing Notes. I have reviewed the NPO Status.   I have reviewed the Medications.     Review of Systems  Anesthesia Hx:  No problems with previous Anesthesia    Social:  Non-Smoker, No Alcohol Use    Hematology/Oncology:  Hematology Normal   Oncology Normal     EENT/Dental:  EENT/Dental Normal glaucoma   Cardiovascular:   Hypertension    Pulmonary:   Sleep Apnea    Renal/:  Renal/ Normal     Hepatic/GI:   GERD    Musculoskeletal:   Arthritis     Neurological:   Seizures    Endocrine:   Diabetes, poorly controlled, type 2  Obesity / BMI > 30  Dermatological:  Skin Normal    Psych:  Psychiatric Normal           Physical Exam  General: Well nourished    Airway:  Mallampati: III / III  Mouth Opening: Normal  TM Distance: > 6 cm  Tongue: Normal  Neck ROM: Normal ROM    Chest/Lungs:  Clear to auscultation, Normal Respiratory Rate    Heart:  Rate: Normal  Rhythm: Regular Rhythm        Anesthesia Plan  Type of Anesthesia, risks & benefits discussed:    Anesthesia Type: Gen Supraglottic Airway  Intra-op Monitoring Plan: Standard ASA Monitors  Post Op Pain Control Plan: multimodal analgesia  Induction:  Inhalation  Informed Consent: Informed consent signed with the Patient and all parties understand the risks and agree with anesthesia plan.  All questions answered.   ASA Score: 3  Day of Surgery Review of History & Physical: H&P Update referred to the surgeon/provider.I have interviewed and examined the patient. I have reviewed the patient's H&P dated: There are no significant changes. H&P completed by Anesthesiologist.    Ready For Surgery From Anesthesia Perspective.     .

## 2023-01-17 NOTE — TRANSFER OF CARE
"Anesthesia Transfer of Care Note    Patient: Audrey Causey    Procedure(s) Performed: Procedure(s) (LRB):  EXCISION, KELOID ON BILATERAL EARLOBES (Bilateral)    Patient location: PACU    Anesthesia Type: general    Transport from OR: Transported from OR on 100% O2 by closed face mask with adequate spontaneous ventilation    Post pain: adequate analgesia    Post assessment: no apparent anesthetic complications    Post vital signs: stable    Level of consciousness: responds to stimulation    Nausea/Vomiting: no nausea/vomiting    Complications: none    Transfer of care protocol was followed      Last vitals:   Visit Vitals  /83   Pulse 75   Temp 37 °C (98.6 °F)   Resp (!) 9   Ht 5' 7" (1.702 m)   Wt 90.3 kg (199 lb)   SpO2 (!) 94%   Breastfeeding No   BMI 31.17 kg/m²     "

## 2023-01-17 NOTE — ANESTHESIA POSTPROCEDURE EVALUATION
Anesthesia Post Evaluation    Patient: Audrey Causey    Procedure(s) Performed: Procedure(s) (LRB):  EXCISION, KELOID ON BILATERAL EARLOBES (Bilateral)    Final Anesthesia Type: general      Patient location during evaluation: PACU  Patient participation: Yes- Able to Participate  Level of consciousness: awake and sedated  Post-procedure vital signs: reviewed and stable  Pain management: adequate  Airway patency: patent    PONV status at discharge: No PONV  Anesthetic complications: no      Cardiovascular status: blood pressure returned to baseline  Respiratory status: unassisted  Hydration status: euvolemic  Follow-up not needed.          Vitals Value Taken Time   /83 01/17/23 1016   Temp 36.6 °C (97.9 °F) 01/17/23 1015   Pulse 69 01/17/23 1019   Resp 16 01/17/23 1015   SpO2 95 % 01/17/23 1019   Vitals shown include unvalidated device data.      Event Time   Out of Recovery 09:35:00         Pain/Mónica Score: Mónica Score: 10 (1/17/2023 10:15 AM)

## 2023-01-18 LAB
ESTROGEN SERPL-MCNC: NORMAL PG/ML
INSULIN SERPL-ACNC: NORMAL U[IU]/ML
LAB AP GROSS DESCRIPTION: NORMAL
LAB AP LABORATORY NOTES: NORMAL
T3RU NFR SERPL: NORMAL %

## 2023-01-18 NOTE — PROGRESS NOTES
Subjective:         Patient ID: Audrey Causey is a 57 y.o. female.    Chief Complaint: Low-back Pain        Pain  This is a chronic problem. The current episode started more than 1 year ago. The problem occurs daily. The problem has been unchanged. Associated symptoms include arthralgias and neck pain. Pertinent negatives include no anorexia, change in bowel habit, chest pain, chills, coughing, diaphoresis, fever, sore throat, vertigo or vomiting.   Review of Systems   Constitutional:  Negative for activity change, appetite change, chills, diaphoresis, fever and unexpected weight change.   HENT:  Negative for drooling, ear discharge, ear pain, facial swelling, nosebleeds, sore throat, trouble swallowing, voice change and goiter.    Eyes:  Negative for photophobia, pain, discharge, redness and visual disturbance.   Respiratory:  Negative for apnea, cough, choking, chest tightness, shortness of breath, wheezing and stridor.    Cardiovascular:  Negative for chest pain, palpitations and leg swelling.   Gastrointestinal:  Negative for abdominal distention, anorexia, change in bowel habit, diarrhea, rectal pain, vomiting, fecal incontinence and change in bowel habit.   Endocrine: Negative for cold intolerance, heat intolerance, polydipsia, polyphagia and polyuria.   Genitourinary:  Negative for bladder incontinence, dysuria, flank pain, frequency and hot flashes.   Musculoskeletal:  Positive for arthralgias, back pain, leg pain, neck pain and neck stiffness.   Integumentary:  Negative for color change and pallor.   Allergic/Immunologic: Negative for immunocompromised state.   Neurological:  Negative for dizziness, vertigo, seizures, syncope, facial asymmetry, speech difficulty, light-headedness, coordination difficulties, memory loss and coordination difficulties.   Hematological:  Negative for adenopathy. Does not bruise/bleed easily.   Psychiatric/Behavioral:  Negative for agitation, behavioral problems, confusion,  decreased concentration, dysphoric mood, hallucinations, self-injury and suicidal ideas. The patient is not nervous/anxious and is not hyperactive.          Past Medical History:   Diagnosis Date    Acute superficial gastritis without hemorrhage 2021    Arthritis     Diabetes mellitus     Diverticula, colon 2021    Esophageal dysphagia 2021    GERD (gastroesophageal reflux disease)     Hypertension     Low back pain     Lumbar radiculopathy     Seizures     Sleep apnea     Unspecified glaucoma      Past Surgical History:   Procedure Laterality Date     SECTION      COLONOSCOPY  2018    Dr. Naranjo    EPIDURAL STEROID INJECTION  2018    L4-5 MARCOS X 5 - Cook    EPIDURAL STEROID INJECTION  2017    L5-S1 MARCOS - Cook    EPIDURAL STEROID INJECTION N/A 2022    Procedure: Injection, Steroid, Epidural, L4/5;  Surgeon: Yari Soto MD;  [facility];  Service: Pain Management;  Laterality: N/A;  MESSAGE LEFT ON VM TO BE TESTED     ESOPHAGOGASTRODUODENOSCOPY  2018    EYE SURGERY      GALLBLADDER SURGERY      HYSTERECTOMY      INJECTION OF FACET JOINT Bilateral 2017    Bilateral L3-S1 Facet Injection - Cook    KELOID EXCISION Bilateral 2023    Procedure: EXCISION, KELOID ON BILATERAL EARLOBES;  Surgeon: Moreno Xiong MD;  Location: [facility] OR;  Service: ENT;  Laterality: Bilateral;    LAPAROSCOPIC CHOLECYSTECTOMY N/A 2021    Procedure: CHOLECYSTECTOMY, LAPAROSCOPIC;  Surgeon: Amy Wagner MD;  Location: [facility] OR;  Service: General;  Laterality: N/A;  fully vaccinated    OOPHORECTOMY      TRANSFORAMINAL EPIDURAL INJECTION OF STEROID Bilateral 2022    Procedure: Injection,steroid,epidural,transforaminal approach, L4/5;  Surgeon: Yari Soto MD;  [facility];  Service: Pain Management;  Laterality: Bilateral;  pt aware on ov to be tested     Social History     Socioeconomic History     "Marital status:    Tobacco Use    Smoking status: Never    Smokeless tobacco: Never   Substance and Sexual Activity    Alcohol use: Never    Drug use: Yes     Types: Oxycodone     Family History   Problem Relation Age of Onset    Hypertension Mother     Breast cancer Mother     Thyroid cancer Mother     Diabetes type II Mother     Stroke Mother     Heart attack Mother      Review of patient's allergies indicates:  No Known Allergies     Objective:  Vitals:    01/19/23 0819   BP: (!) 175/79   Pulse: 80   Resp: 18   Weight: 93 kg (205 lb)   Height: 5' 7" (1.702 m)   PainSc:   8         Physical Exam  Vitals and nursing note reviewed. Exam conducted with a chaperone present.   Constitutional:       General: She is awake. She is not in acute distress.     Appearance: Normal appearance. She is not ill-appearing, toxic-appearing or diaphoretic.   HENT:      Head: Normocephalic and atraumatic.      Nose: Nose normal.      Mouth/Throat:      Mouth: Mucous membranes are moist.      Pharynx: Oropharynx is clear.   Eyes:      Conjunctiva/sclera: Conjunctivae normal.      Pupils: Pupils are equal, round, and reactive to light.   Cardiovascular:      Rate and Rhythm: Normal rate.   Pulmonary:      Effort: Pulmonary effort is normal. No respiratory distress.   Abdominal:      Palpations: Abdomen is soft.      Tenderness: There is no guarding.   Musculoskeletal:         General: Normal range of motion.      Cervical back: Normal range of motion and neck supple. Tenderness present. No rigidity.      Thoracic back: Tenderness present.      Lumbar back: Tenderness present.      Right knee: Tenderness present.      Left knee: Tenderness present.   Skin:     General: Skin is warm and dry.      Coloration: Skin is not jaundiced or pale.   Neurological:      General: No focal deficit present.      Mental Status: She is alert and oriented to person, place, and time. Mental status is at baseline.      Cranial Nerves: No cranial " nerve deficit (II-XII).   Psychiatric:         Mood and Affect: Mood normal.         Behavior: Behavior normal. Behavior is cooperative.         Thought Content: Thought content normal.         X-Ray Hips Bilateral 2 View Inc AP Pelvis  Narrative: EXAMINATION:  XR HIPS BILATERAL 2 VIEW INCL AP PELVIS    CLINICAL HISTORY:  Radiculopathy, lumbar region    COMPARISON:  None available    FINDINGS:  No evidence of fracture seen.  The alignment of the joints appears normal.  Mild bilateral hip degenerative change is present.  No soft tissue abnormality is seen.  Impression: Mild hip osteoarthrosis.    Electronically signed by: Joe Munoz  Date:    10/05/2022  Time:    14:48  MRI Lumbar Spine Without Contrast  Narrative: EXAMINATION:  MRI LUMBAR SPINE WITHOUT CONTRAST    CLINICAL HISTORY:  Radiculopathy, lumbar regionLumbar radiculopathy, symptoms persist with conservative treatment;    COMPARISON:  None    TECHNIQUE:  Multiplanar MRI imaging of the lumbar spine was performed without the use of intravenous contrast.    FINDINGS:  There is straightening of normal lumbar lordosis which may be positional or secondary to muscle spasm. There is no significant anterolisthesis or retrolisthesis.  Multilevel mild marginal vertebral body osteophyte formation.  Multilevel disc desiccation.  Lumbar vertebral body heights appear maintained.    Intervertebral disc space levels:    L1-L2: No significant disc bulge, neuroforaminal narrowing or spinal canal stenosis.    L2-L3: No significant disc bulge, neuroforaminal narrowing or spinal canal stenosis.    L3-L4: Minimal diffuse disc bulging and posterior facet/ligamentum flavum hypertrophy.  Minimal spinal canal and bilateral neural foraminal narrowing.    L4-L5: Minimal diffuse disc bulging as well as posterior facet/ligamentum flavum hypertrophy.  Mild spinal canal and bilateral neural foraminal narrowing.    L5-S1: Mild-to-moderate loss of disc space height.  Minimal diffuse disc  bulging.  Mild posterior facet arthropathy.  Minimal spinal canal narrowing.  Mild bilateral neural foraminal narrowing.  Impression: Mild degenerative change of the lumbar spine with mild spinal canal and neuroforaminal narrowing as detailed above.    Point of Service: [facility]    Electronically signed by: Sonny Clancy  Date:    10/05/2022  Time:    14:03         Admission on 01/17/2023, Discharged on 01/17/2023   Component Date Value Ref Range Status    POC Glucose 01/17/2023 159 (H)  70 - 105 mg/dL Final    Case Report 01/17/2023    Final                    Value:Surgical Pathology                                Case: [id number]                                   Authorizing Provider:  Moreno Xiong MD        Collected:           01/17/2023 08:52 AM          Ordering Location:     [facility] - Orthopedic      Received:            01/17/2023 10:22 AM                                 Periop Services                                                              Pathologist:           Steven Donovan MD                                                       Specimens:   A) - Ear, Right, 1 keloid from right ear lobe                                                       B) - Ear, Left, 2 keloid from left ear lobe                                                Final Diagnosis 01/17/2023    Final                    Value:This result contains rich text formatting which cannot be displayed here.    Gross Description 01/17/2023    Final                    Value:This result contains rich text formatting which cannot be displayed here.    Microscopic Description 01/17/2023    Final                    Value:This result contains rich text formatting which cannot be displayed here.    Laboratory Notes 01/17/2023    Final                    Value:This result contains rich text formatting which cannot be displayed here.    POC Glucose 01/17/2023 146 (H)  70 - 105 mg/dL Final   Lab Visit on 11/22/2022   Component  Date Value Ref Range Status    Nil Result, TB 11/22/2022 0.06   Final    TB1 Ag minus Nil Result 11/22/2022 0.04   Final    TB2 Ag minus Nil Result 11/22/2022 0.00   Final    Mitogen minus Nil Result, TB 11/22/2022 >10.00   Final    QuantiFERON-Tb Gold Plus 11/22/2022 Negative  Negative Final    Sodium 11/22/2022 143  136 - 145 mmol/L Final    Potassium 11/22/2022 3.5  3.5 - 5.1 mmol/L Final    Chloride 11/22/2022 105  98 - 107 mmol/L Final    CO2 11/22/2022 32  21 - 32 mmol/L Final    Anion Gap 11/22/2022 10  7 - 16 mmol/L Final    Glucose 11/22/2022 172 (H)  74 - 106 mg/dL Final    BUN 11/22/2022 9  7 - 18 mg/dL Final    Creatinine 11/22/2022 0.74  0.55 - 1.02 mg/dL Final    BUN/Creatinine Ratio 11/22/2022 12  6 - 20 Final    Calcium 11/22/2022 9.7  8.5 - 10.1 mg/dL Final    Total Protein 11/22/2022 7.6  6.4 - 8.2 g/dL Final    Albumin 11/22/2022 3.7  3.5 - 5.0 g/dL Final    Globulin 11/22/2022 3.9  2.0 - 4.0 g/dL Final    A/G Ratio 11/22/2022 0.9   Final    Bilirubin, Total 11/22/2022 0.2  >0.0 - 1.2 mg/dL Final    Alk Phos 11/22/2022 112  46 - 118 U/L Final    ALT 11/22/2022 65 (H)  13 - 56 U/L Final    AST 11/22/2022 20  15 - 37 U/L Final    eGFR 11/22/2022 95  >=60 mL/min/1.73m² Final    RA Titer 11/22/2022 <10  0 - 15 IU/mL Final    CRP 11/22/2022 1.97 (H)  0.00 - 0.80 mg/dL Final    ESR Westergren 11/22/2022 25  0 - 30 mm/Hr Final    Syphilis Ab Interpretation 11/22/2022 Non-Reactive  Non-Reactive Final    Free T4 11/22/2022 0.97  0.76 - 1.46 ng/dL Final    TSH 11/22/2022 3.300  0.358 - 3.740 uIU/mL Final    Lupus Anticoagulant Tech Interp 11/22/2022 SEE COMMENTS   Final    Prothrombin Time (PT), P 11/22/2022 11.5  9.4 - 12.5 sec Final    INR 11/22/2022 1.0  0.9 - 1.1 Final    Activated Partial Thrombopl Time, P 11/22/2022 32  25 - 37 sec Final    DRVVT Screen Ratio 11/22/2022 0.93  <1.20 ratio Final    Vitamin D 25-Hydroxy, Blood 11/22/2022 23.9  ng/mL Final    Hepatitis C Ab 11/22/2022 Non-Reactive   Non-Reactive Final    Uric Acid 11/22/2022 4.9  2.6 - 6.0 mg/dL Final    Antistreptolysin O (ASO) 11/22/2022 16.0  0.0 - 408.0 IU/mL Final    Anti-dsDNA 11/22/2022 Negative  Negative Final    Anti-DSDNA (IU) 11/22/2022 5  0 - 24 IU Final    West Nile Virus Ab, IgG 11/22/2022 Negative  Negative Final    West Nile Virus Ab, IgM 11/22/2022 Negative  Negative Final    West Nile Serum Interpretation 11/22/2022 SEE COMMENTS   Final    C3 11/22/2022 199 (H)  90 - 180 mg/dL Final    C4 11/22/2022 35  10 - 40 mg/dL Final    Complement, Total 11/22/2022 68  30 - 75 U/mL Final    CCP 11/22/2022 <16.0  <=19.9 units Final    Anaplasma phagocytophilum Ab, IgG,S 11/22/2022 <1:64  <1:64 titer Final    Ehrlichia Chaffeensis (HME) Ab, IgG 11/22/2022 <1:64  <1:64 titer Final    Spotted Fever Group Ab, IgG, S 11/22/2022 <1:64  <1:64 Final    Spotted Fever Group Ab, IgM, S 11/22/2022 <1:64  <1:64 Final    Lyme IgG/IgM 11/22/2022 Non-Reactive  Non-Reactive Final    CK 11/22/2022 61  26 - 192 U/L Final    WBC 11/22/2022 5.91  4.50 - 11.00 K/uL Final    RBC 11/22/2022 4.22  4.20 - 5.40 M/uL Final    Hemoglobin 11/22/2022 12.0  12.0 - 16.0 g/dL Final    Hematocrit 11/22/2022 38.0  38.0 - 47.0 % Final    MCV 11/22/2022 90.0  80.0 - 96.0 fL Final    MCH 11/22/2022 28.4  27.0 - 31.0 pg Final    MCHC 11/22/2022 31.6 (L)  32.0 - 36.0 g/dL Final    RDW 11/22/2022 13.9  11.5 - 14.5 % Final    Platelet Count 11/22/2022 388  150 - 400 K/uL Final    MPV 11/22/2022 10.0  9.4 - 12.4 fL Final    Neutrophils % 11/22/2022 56.5  53.0 - 65.0 % Final    Lymphocytes % 11/22/2022 35.2  27.0 - 41.0 % Final    Monocytes % 11/22/2022 6.1 (H)  2.0 - 6.0 % Final    Eosinophils % 11/22/2022 1.5  1.0 - 4.0 % Final    Basophils % 11/22/2022 0.5  0.0 - 1.0 % Final    Immature Granulocytes % 11/22/2022 0.2  0.0 - 0.4 % Final    nRBC, Auto 11/22/2022 0.0  <=0.0 % Final    Neutrophils, Abs 11/22/2022 3.34  1.80 - 7.70 K/uL Final    Lymphocytes, Absolute 11/22/2022 2.08   1.00 - 4.80 K/uL Final    Monocytes, Absolute 11/22/2022 0.36  0.00 - 0.80 K/uL Final    Eosinophils, Absolute 11/22/2022 0.09  0.00 - 0.50 K/uL Final    Basophils, Absolute 11/22/2022 0.03  0.00 - 0.20 K/uL Final    Immature Granulocytes, Absolute 11/22/2022 0.01  0.00 - 0.04 K/uL Final    nRBC, Absolute 11/22/2022 0.00  <=0.00 x10e3/uL Final    Diff Type 11/22/2022 Auto   Final    HLA-B27 Result 11/22/2022 Negative  Not Applicable Final    Interpretation 11/22/2022 SEE COMMENTS   Final   Office Visit on 11/22/2022   Component Date Value Ref Range Status    POC Amphetamines 11/22/2022 Negative  Negative, Inconclusive Final-Edited    POC Barbiturates 11/22/2022 Negative  Negative, Inconclusive Final-Edited    POC Benzodiazepines 11/22/2022 Negative  Negative, Inconclusive Final-Edited    POC Cocaine 11/22/2022 Negative  Negative, Inconclusive Final-Edited    POC THC 11/22/2022 Negative  Negative, Inconclusive Final-Edited    POC Methadone 11/22/2022 Negative  Negative, Inconclusive Final-Edited    POC Methamphetamine 11/22/2022 Negative  Negative, Inconclusive Final-Edited    POC Opiates 11/22/2022 Negative  Negative, Inconclusive Corrected    POC Oxycodone 11/22/2022 Presumptive Positive (A)  Negative, Inconclusive Corrected    POC Phencyclidine 11/22/2022 Negative  Negative, Inconclusive Final-Edited    POC Methylenedioxymethamphetamine * 11/22/2022 Negative  Negative, Inconclusive Final-Edited    POC Tricyclic Antidepressants 11/22/2022 Negative  Negative, Inconclusive Final-Edited    POC Buprenorphine 11/22/2022 Negative   Final-Edited     Acceptable 11/22/2022 Yes   Final-Edited   Lab Visit on 10/05/2022   Component Date Value Ref Range Status    CRP 10/05/2022 4.48 (H)  0.00 - 0.80 mg/dL Final    ESR Westergren 10/05/2022 37 (H)  0 - 30 mm/Hr Final    ISACC Screen 10/05/2022 Negative  Negative Final    WBC 10/05/2022 5.89  4.50 - 11.00 K/uL Final    RBC 10/05/2022 4.29  4.20 - 5.40 M/uL Final     Hemoglobin 10/05/2022 12.3  12.0 - 16.0 g/dL Final    Hematocrit 10/05/2022 37.4 (L)  38.0 - 47.0 % Final    MCV 10/05/2022 87.2  80.0 - 96.0 fL Final    MCH 10/05/2022 28.7  27.0 - 31.0 pg Final    MCHC 10/05/2022 32.9  32.0 - 36.0 g/dL Final    RDW 10/05/2022 13.9  11.5 - 14.5 % Final    Platelet Count 10/05/2022 357  150 - 400 K/uL Final    MPV 10/05/2022 9.6  9.4 - 12.4 fL Final    Neutrophils % 10/05/2022 54.7  53.0 - 65.0 % Final    Lymphocytes % 10/05/2022 36.5  27.0 - 41.0 % Final    Monocytes % 10/05/2022 6.1 (H)  2.0 - 6.0 % Final    Eosinophils % 10/05/2022 2.2  1.0 - 4.0 % Final    Basophils % 10/05/2022 0.3  0.0 - 1.0 % Final    Immature Granulocytes % 10/05/2022 0.2  0.0 - 0.4 % Final    nRBC, Auto 10/05/2022 0.0  <=0.0 % Final    Neutrophils, Abs 10/05/2022 3.22  1.80 - 7.70 K/uL Final    Lymphocytes, Absolute 10/05/2022 2.15  1.00 - 4.80 K/uL Final    Monocytes, Absolute 10/05/2022 0.36  0.00 - 0.80 K/uL Final    Eosinophils, Absolute 10/05/2022 0.13  0.00 - 0.50 K/uL Final    Basophils, Absolute 10/05/2022 0.02  0.00 - 0.20 K/uL Final    Immature Granulocytes, Absolute 10/05/2022 0.01  0.00 - 0.04 K/uL Final    nRBC, Absolute 10/05/2022 0.00  <=0.00 x10e3/uL Final    Diff Type 10/05/2022 Auto   Final   Office Visit on 08/03/2022   Component Date Value Ref Range Status    POC Amphetamines 08/03/2022 Negative  Negative, Inconclusive Final    POC Barbiturates 08/03/2022 Negative  Negative, Inconclusive Final    POC Benzodiazepines 08/03/2022 Negative  Negative, Inconclusive Final    POC Cocaine 08/03/2022 Negative  Negative, Inconclusive Final    POC THC 08/03/2022 Negative  Negative, Inconclusive Final    POC Methadone 08/03/2022 Negative  Negative, Inconclusive Final    POC Methamphetamine 08/03/2022 Negative  Negative, Inconclusive Final    POC Opiates 08/03/2022 Negative  Negative, Inconclusive Final    POC Oxycodone 08/03/2022 Presumptive Positive (A)  Negative, Inconclusive Final    POC  Phencyclidine 2022 Negative  Negative, Inconclusive Final    POC Methylenedioxymethamphetamine * 2022 Negative  Negative, Inconclusive Final    POC Tricyclic Antidepressants 2022 Negative  Negative, Inconclusive Final    POC Buprenorphine 2022 Negative   Final     Acceptable 2022 Yes   Final    POC Temperature (Urine) 2022 92   Final         No orders of the defined types were placed in this encounter.        Requested Prescriptions     Signed Prescriptions Disp Refills    gabapentin (NEURONTIN) 400 MG capsule 60 capsule 1     Sig: Take 1 capsule (400 mg total) by mouth every 12 (twelve) hours.    naloxone (NARCAN) 4 mg/actuation Spry 2 each 0     Si sprays (8 mg total) by Nasal route once. Call 911, Two sprays alternate nostril, may repeat 2-3 minutes as needed for 1 dose    oxyCODONE-acetaminophen (PERCOCET)  mg per tablet 90 tablet 0     Sig: Take 1 tablet by mouth every 8 (eight) hours as needed for Pain.    oxyCODONE-acetaminophen (PERCOCET)  mg per tablet 90 tablet 0     Sig: Take 1 tablet by mouth every 8 (eight) hours as needed for Pain.       Assessment:     1. Chronic inflammatory arthritis    2. Chronic pain of right knee    3. Osteoarthrosis multiple sites, not specified as generalized    4. Lumbar radiculopathy, chronic         A's of Opioid Risk Assessment  Activity:Patient can perform ADL.   Analgesia:Patients pain is partially controlled by current medication. Patient has tried OTC medications such as Tylenol and Ibuprofen with out relief.   Adverse Effects: Patient denies constipation or sedation.  Aberrant Behavior:  reviewed with no aberrant drug seeking/taking behavior.  Overdose reversal drug naloxone discussed    Drug screen reviewed      Plan:    Narcan  ESR elevated pending rheumatology evaluation     Vitamin-D level , will start vitamin-D replacement 2022    She had bilateral  lumbar L4/5 TFESI # 2 March 22, 2022  She states she had 80% relief after procedure she states that procedure did help increase her level function she has had more than 3 months pain relief following her procedure    Could not afford co-pay for any further procedures    Continues complain multiple areas joint pain back pain leg pain radicular in nature     She will discuss rheumatology referral with her primary care provider    Continue current medication    Continue home exercise program as directed    Follow-up 2 months    Dr. Soto, March 2023    Bring original prescription medication bottles/container/box with labels to each visit    Pill count    Physical therapy    Massage therapy declines

## 2023-01-19 ENCOUNTER — OFFICE VISIT (OUTPATIENT)
Dept: PAIN MEDICINE | Facility: CLINIC | Age: 58
End: 2023-01-19
Payer: MEDICARE

## 2023-01-19 VITALS
RESPIRATION RATE: 18 BRPM | HEART RATE: 80 BPM | HEIGHT: 67 IN | WEIGHT: 205 LBS | DIASTOLIC BLOOD PRESSURE: 79 MMHG | BODY MASS INDEX: 32.18 KG/M2 | SYSTOLIC BLOOD PRESSURE: 175 MMHG

## 2023-01-19 DIAGNOSIS — M89.49 OSTEOARTHROSIS MULTIPLE SITES, NOT SPECIFIED AS GENERALIZED: Chronic | ICD-10-CM

## 2023-01-19 DIAGNOSIS — G89.29 CHRONIC PAIN OF RIGHT KNEE: Chronic | ICD-10-CM

## 2023-01-19 DIAGNOSIS — M54.16 LUMBAR RADICULOPATHY, CHRONIC: Chronic | ICD-10-CM

## 2023-01-19 DIAGNOSIS — M19.90 CHRONIC INFLAMMATORY ARTHRITIS: Primary | ICD-10-CM

## 2023-01-19 DIAGNOSIS — M25.561 CHRONIC PAIN OF RIGHT KNEE: Chronic | ICD-10-CM

## 2023-01-19 PROCEDURE — 1159F MED LIST DOCD IN RCRD: CPT | Mod: CPTII,,, | Performed by: PHYSICIAN ASSISTANT

## 2023-01-19 PROCEDURE — 4010F PR ACE/ARB THEARPY RXD/TAKEN: ICD-10-PCS | Mod: CPTII,,, | Performed by: PHYSICIAN ASSISTANT

## 2023-01-19 PROCEDURE — 3008F BODY MASS INDEX DOCD: CPT | Mod: CPTII,,, | Performed by: PHYSICIAN ASSISTANT

## 2023-01-19 PROCEDURE — 3008F PR BODY MASS INDEX (BMI) DOCUMENTED: ICD-10-PCS | Mod: CPTII,,, | Performed by: PHYSICIAN ASSISTANT

## 2023-01-19 PROCEDURE — 99214 OFFICE O/P EST MOD 30 MIN: CPT | Mod: PBBFAC | Performed by: PHYSICIAN ASSISTANT

## 2023-01-19 PROCEDURE — 3077F PR MOST RECENT SYSTOLIC BLOOD PRESSURE >= 140 MM HG: ICD-10-PCS | Mod: CPTII,,, | Performed by: PHYSICIAN ASSISTANT

## 2023-01-19 PROCEDURE — 3078F PR MOST RECENT DIASTOLIC BLOOD PRESSURE < 80 MM HG: ICD-10-PCS | Mod: CPTII,,, | Performed by: PHYSICIAN ASSISTANT

## 2023-01-19 PROCEDURE — 1159F PR MEDICATION LIST DOCUMENTED IN MEDICAL RECORD: ICD-10-PCS | Mod: CPTII,,, | Performed by: PHYSICIAN ASSISTANT

## 2023-01-19 PROCEDURE — 4010F ACE/ARB THERAPY RXD/TAKEN: CPT | Mod: CPTII,,, | Performed by: PHYSICIAN ASSISTANT

## 2023-01-19 PROCEDURE — 3078F DIAST BP <80 MM HG: CPT | Mod: CPTII,,, | Performed by: PHYSICIAN ASSISTANT

## 2023-01-19 PROCEDURE — 99214 PR OFFICE/OUTPT VISIT, EST, LEVL IV, 30-39 MIN: ICD-10-PCS | Mod: S$PBB,,, | Performed by: PHYSICIAN ASSISTANT

## 2023-01-19 PROCEDURE — 99214 OFFICE O/P EST MOD 30 MIN: CPT | Mod: S$PBB,,, | Performed by: PHYSICIAN ASSISTANT

## 2023-01-19 PROCEDURE — 3077F SYST BP >= 140 MM HG: CPT | Mod: CPTII,,, | Performed by: PHYSICIAN ASSISTANT

## 2023-01-19 RX ORDER — OXYCODONE AND ACETAMINOPHEN 10; 325 MG/1; MG/1
1 TABLET ORAL EVERY 8 HOURS PRN
Qty: 90 TABLET | Refills: 0 | Status: SHIPPED | OUTPATIENT
Start: 2023-02-21 | End: 2023-03-21 | Stop reason: SDUPTHER

## 2023-01-19 RX ORDER — NALOXONE HYDROCHLORIDE 4 MG/.1ML
2 SPRAY NASAL ONCE
Qty: 2 EACH | Refills: 0 | Status: SHIPPED | OUTPATIENT
Start: 2023-01-19 | End: 2023-01-19

## 2023-01-19 RX ORDER — GABAPENTIN 400 MG/1
400 CAPSULE ORAL EVERY 12 HOURS
Qty: 60 CAPSULE | Refills: 1 | Status: SHIPPED | OUTPATIENT
Start: 2023-01-19 | End: 2023-03-21 | Stop reason: SDUPTHER

## 2023-01-19 RX ORDER — OXYCODONE AND ACETAMINOPHEN 10; 325 MG/1; MG/1
1 TABLET ORAL EVERY 8 HOURS PRN
Qty: 90 TABLET | Refills: 0 | Status: SHIPPED | OUTPATIENT
Start: 2023-01-20 | End: 2023-02-19

## 2023-01-27 ENCOUNTER — OFFICE VISIT (OUTPATIENT)
Dept: OTOLARYNGOLOGY | Facility: CLINIC | Age: 58
End: 2023-01-27
Payer: MEDICARE

## 2023-01-27 VITALS — BODY MASS INDEX: 32.18 KG/M2 | WEIGHT: 205 LBS | HEIGHT: 67 IN

## 2023-01-27 DIAGNOSIS — L91.0 KELOID OF SKIN: Primary | ICD-10-CM

## 2023-01-27 PROCEDURE — 4010F ACE/ARB THERAPY RXD/TAKEN: CPT | Mod: CPTII,,, | Performed by: OTOLARYNGOLOGY

## 2023-01-27 PROCEDURE — 1160F PR REVIEW ALL MEDS BY PRESCRIBER/CLIN PHARMACIST DOCUMENTED: ICD-10-PCS | Mod: CPTII,,, | Performed by: OTOLARYNGOLOGY

## 2023-01-27 PROCEDURE — 1160F RVW MEDS BY RX/DR IN RCRD: CPT | Mod: CPTII,,, | Performed by: OTOLARYNGOLOGY

## 2023-01-27 PROCEDURE — 3008F PR BODY MASS INDEX (BMI) DOCUMENTED: ICD-10-PCS | Mod: CPTII,,, | Performed by: OTOLARYNGOLOGY

## 2023-01-27 PROCEDURE — 99214 OFFICE O/P EST MOD 30 MIN: CPT | Mod: PBBFAC | Performed by: OTOLARYNGOLOGY

## 2023-01-27 PROCEDURE — 1159F PR MEDICATION LIST DOCUMENTED IN MEDICAL RECORD: ICD-10-PCS | Mod: CPTII,,, | Performed by: OTOLARYNGOLOGY

## 2023-01-27 PROCEDURE — 1159F MED LIST DOCD IN RCRD: CPT | Mod: CPTII,,, | Performed by: OTOLARYNGOLOGY

## 2023-01-27 PROCEDURE — 4010F PR ACE/ARB THEARPY RXD/TAKEN: ICD-10-PCS | Mod: CPTII,,, | Performed by: OTOLARYNGOLOGY

## 2023-01-27 PROCEDURE — 99024 POSTOP FOLLOW-UP VISIT: CPT | Mod: ,,, | Performed by: OTOLARYNGOLOGY

## 2023-01-27 PROCEDURE — 99024 PR POST-OP FOLLOW-UP VISIT: ICD-10-PCS | Mod: ,,, | Performed by: OTOLARYNGOLOGY

## 2023-01-27 PROCEDURE — 3008F BODY MASS INDEX DOCD: CPT | Mod: CPTII,,, | Performed by: OTOLARYNGOLOGY

## 2023-03-21 NOTE — PROGRESS NOTES
Subjective:         Patient ID: Audrey Causey is a 57 y.o. female.    Chief Complaint: Low-back Pain, Leg Pain, and Hip Pain        Pain  This is a chronic problem. The current episode started more than 1 year ago. The problem occurs daily. The problem has been waxing and waning. Associated symptoms include arthralgias and neck pain. Pertinent negatives include no anorexia, chest pain, chills, coughing, diaphoresis, fatigue, fever, sore throat, vertigo or vomiting.   Review of Systems   Constitutional:  Negative for activity change, appetite change, chills, diaphoresis, fatigue, fever and unexpected weight change.   HENT:  Negative for drooling, ear discharge, ear pain, facial swelling, nosebleeds, sore throat, trouble swallowing, voice change and goiter.    Eyes:  Negative for photophobia, pain, discharge, redness and visual disturbance.   Respiratory:  Negative for apnea, cough, choking, chest tightness, shortness of breath, wheezing and stridor.    Cardiovascular:  Negative for chest pain, palpitations and leg swelling.   Gastrointestinal:  Negative for abdominal distention, anorexia, diarrhea, rectal pain, vomiting and fecal incontinence.   Endocrine: Negative for cold intolerance, heat intolerance, polydipsia, polyphagia and polyuria.   Genitourinary:  Negative for bladder incontinence, dysuria, flank pain, frequency and hot flashes.   Musculoskeletal:  Positive for arthralgias, back pain, leg pain, neck pain and neck stiffness.   Integumentary:  Negative for color change and pallor.   Allergic/Immunologic: Negative for immunocompromised state.   Neurological:  Negative for dizziness, vertigo, seizures, syncope, facial asymmetry, speech difficulty, light-headedness, coordination difficulties, memory loss and coordination difficulties.   Hematological:  Negative for adenopathy. Does not bruise/bleed easily.   Psychiatric/Behavioral:  Negative for agitation, behavioral problems, confusion, decreased  concentration, dysphoric mood, hallucinations, self-injury and suicidal ideas. The patient is not nervous/anxious and is not hyperactive.          Past Medical History:   Diagnosis Date    Acute superficial gastritis without hemorrhage 2021    Arthritis     Diabetes mellitus     Diverticula, colon 2021    Esophageal dysphagia 2021    GERD (gastroesophageal reflux disease)     Hypertension     Low back pain     Lumbar radiculopathy     Seizures     Sleep apnea     Unspecified glaucoma      Past Surgical History:   Procedure Laterality Date     SECTION      COLONOSCOPY  2018    Dr. Naranjo    EPIDURAL STEROID INJECTION  2018    L4-5 MARCOS X 5 - Cook    EPIDURAL STEROID INJECTION  2017    L5-S1 MARCOS - Cook    EPIDURAL STEROID INJECTION N/A 2022    Procedure: Injection, Steroid, Epidural, L4/5;  Surgeon: Yari Soto MD;  Location: [facility];  Service: Pain Management;  Laterality: N/A;  MESSAGE LEFT ON VM TO BE TESTED ON 1-14    ESOPHAGOGASTRODUODENOSCOPY  2018    EYE SURGERY      GALLBLADDER SURGERY      HYSTERECTOMY      INJECTION OF FACET JOINT Bilateral 2017    Bilateral L3-S1 Facet Injection - Cook    KELOID EXCISION Bilateral 2023    Procedure: EXCISION, KELOID ON BILATERAL EARLOBES;  Surgeon: Moreno Xiong MD;  Location: [facility] OR;  Service: ENT;  Laterality: Bilateral;    LAPAROSCOPIC CHOLECYSTECTOMY N/A 2021    Procedure: CHOLECYSTECTOMY, LAPAROSCOPIC;  Surgeon: Amy Wagner MD;  Location: [facility] OR;  Service: General;  Laterality: N/A;  fully vaccinated    OOPHORECTOMY      TRANSFORAMINAL EPIDURAL INJECTION OF STEROID Bilateral 2022    Procedure: Injection,steroid,epidural,transforaminal approach, L4/5;  Surgeon: Yari Soto MD;  Location: [facility];  Service: Pain Management;  Laterality: Bilateral;  pt aware on ov to be tested     Social History     Socioeconomic History    Marital status:  "   Tobacco Use    Smoking status: Never    Smokeless tobacco: Never   Substance and Sexual Activity    Alcohol use: Never    Drug use: Yes     Types: Oxycodone     Family History   Problem Relation Age of Onset    Hypertension Mother     Breast cancer Mother     Thyroid cancer Mother     Diabetes type II Mother     Stroke Mother     Heart attack Mother      Review of patient's allergies indicates:  No Known Allergies     Objective:  Vitals:    03/22/23 0810   BP: (!) 152/75   Pulse: 92   Resp: 18   Weight: 92.1 kg (203 lb)   Height: 5' 7" (1.702 m)   PainSc:   8           Physical Exam  Vitals and nursing note reviewed. Exam conducted with a chaperone present.   Constitutional:       General: She is awake.      Appearance: Normal appearance. She is not ill-appearing, toxic-appearing or diaphoretic.   HENT:      Head: Normocephalic and atraumatic.      Nose: Nose normal.      Mouth/Throat:      Mouth: Mucous membranes are moist.      Pharynx: Oropharynx is clear.   Eyes:      Conjunctiva/sclera: Conjunctivae normal.      Pupils: Pupils are equal, round, and reactive to light.   Cardiovascular:      Rate and Rhythm: Normal rate.   Pulmonary:      Effort: Pulmonary effort is normal. No respiratory distress.   Abdominal:      Palpations: Abdomen is soft.      Tenderness: There is no guarding.   Musculoskeletal:         General: Normal range of motion.      Cervical back: Normal range of motion and neck supple. Tenderness present. No rigidity.      Thoracic back: Tenderness present.      Lumbar back: Tenderness present.      Right knee: Tenderness present.      Left knee: Tenderness present.   Skin:     General: Skin is warm and dry.      Coloration: Skin is not jaundiced or pale.   Neurological:      General: No focal deficit present.      Mental Status: She is alert and oriented to person, place, and time. Mental status is at baseline.      Cranial Nerves: No cranial nerve deficit (II-XII).   Psychiatric:        "  Mood and Affect: Mood normal.         Behavior: Behavior normal. Behavior is cooperative.         Thought Content: Thought content normal.         X-Ray Hips Bilateral 2 View Inc AP Pelvis  Narrative: EXAMINATION:  XR HIPS BILATERAL 2 VIEW INCL AP PELVIS    CLINICAL HISTORY:  Radiculopathy, lumbar region    COMPARISON:  None available    FINDINGS:  No evidence of fracture seen.  The alignment of the joints appears normal.  Mild bilateral hip degenerative change is present.  No soft tissue abnormality is seen.  Impression: Mild hip osteoarthrosis.    Electronically signed by: Joe Munoz  Date:    10/05/2022  Time:    14:48  MRI Lumbar Spine Without Contrast  Narrative: EXAMINATION:  MRI LUMBAR SPINE WITHOUT CONTRAST    CLINICAL HISTORY:  Radiculopathy, lumbar regionLumbar radiculopathy, symptoms persist with conservative treatment;    COMPARISON:  None    TECHNIQUE:  Multiplanar MRI imaging of the lumbar spine was performed without the use of intravenous contrast.    FINDINGS:  There is straightening of normal lumbar lordosis which may be positional or secondary to muscle spasm. There is no significant anterolisthesis or retrolisthesis.  Multilevel mild marginal vertebral body osteophyte formation.  Multilevel disc desiccation.  Lumbar vertebral body heights appear maintained.    Intervertebral disc space levels:    L1-L2: No significant disc bulge, neuroforaminal narrowing or spinal canal stenosis.    L2-L3: No significant disc bulge, neuroforaminal narrowing or spinal canal stenosis.    L3-L4: Minimal diffuse disc bulging and posterior facet/ligamentum flavum hypertrophy.  Minimal spinal canal and bilateral neural foraminal narrowing.    L4-L5: Minimal diffuse disc bulging as well as posterior facet/ligamentum flavum hypertrophy.  Mild spinal canal and bilateral neural foraminal narrowing.    L5-S1: Mild-to-moderate loss of disc space height.  Minimal diffuse disc bulging.  Mild posterior facet arthropathy.   Minimal spinal canal narrowing.  Mild bilateral neural foraminal narrowing.  Impression: Mild degenerative change of the lumbar spine with mild spinal canal and neuroforaminal narrowing as detailed above.    Point of Service: [facility]    Electronically signed by: Sonny Clancy  Date:    10/05/2022  Time:    14:03         Admission on 01/17/2023, Discharged on 01/17/2023   Component Date Value Ref Range Status    POC Glucose 01/17/2023 159 (H)  70 - 105 mg/dL Final    Case Report 01/17/2023    Final                    Value:Surgical Pathology                                Case: [id number]                                   Authorizing Provider:  Moreno Xiong MD        Collected:           01/17/2023 08:52 AM          Ordering Location:     [facility] - Orthopedic      Received:            01/17/2023 10:22 AM                                 Periop Services                                                              Pathologist:           Steven Donovan MD                                                       Specimens:   A) - Ear, Right, 1 keloid from right ear lobe                                                       B) - Ear, Left, 2 keloid from left ear lobe                                                Final Diagnosis 01/17/2023    Final                    Value:This result contains rich text formatting which cannot be displayed here.    Gross Description 01/17/2023    Final                    Value:This result contains rich text formatting which cannot be displayed here.    Microscopic Description 01/17/2023    Final                    Value:This result contains rich text formatting which cannot be displayed here.    Laboratory Notes 01/17/2023    Final                    Value:This result contains rich text formatting which cannot be displayed here.    POC Glucose 01/17/2023 146 (H)  70 - 105 mg/dL Final   Lab Visit on 11/22/2022   Component Date Value Ref Range Status    Nil Result, TB  11/22/2022 0.06   Final    TB1 Ag minus Nil Result 11/22/2022 0.04   Final    TB2 Ag minus Nil Result 11/22/2022 0.00   Final    Mitogen minus Nil Result, TB 11/22/2022 >10.00   Final    QuantiFERON-Tb Gold Plus 11/22/2022 Negative  Negative Final    Sodium 11/22/2022 143  136 - 145 mmol/L Final    Potassium 11/22/2022 3.5  3.5 - 5.1 mmol/L Final    Chloride 11/22/2022 105  98 - 107 mmol/L Final    CO2 11/22/2022 32  21 - 32 mmol/L Final    Anion Gap 11/22/2022 10  7 - 16 mmol/L Final    Glucose 11/22/2022 172 (H)  74 - 106 mg/dL Final    BUN 11/22/2022 9  7 - 18 mg/dL Final    Creatinine 11/22/2022 0.74  0.55 - 1.02 mg/dL Final    BUN/Creatinine Ratio 11/22/2022 12  6 - 20 Final    Calcium 11/22/2022 9.7  8.5 - 10.1 mg/dL Final    Total Protein 11/22/2022 7.6  6.4 - 8.2 g/dL Final    Albumin 11/22/2022 3.7  3.5 - 5.0 g/dL Final    Globulin 11/22/2022 3.9  2.0 - 4.0 g/dL Final    A/G Ratio 11/22/2022 0.9   Final    Bilirubin, Total 11/22/2022 0.2  >0.0 - 1.2 mg/dL Final    Alk Phos 11/22/2022 112  46 - 118 U/L Final    ALT 11/22/2022 65 (H)  13 - 56 U/L Final    AST 11/22/2022 20  15 - 37 U/L Final    eGFR 11/22/2022 95  >=60 mL/min/1.73m² Final    RA Titer 11/22/2022 <10  0 - 15 IU/mL Final    CRP 11/22/2022 1.97 (H)  0.00 - 0.80 mg/dL Final    ESR Westergren 11/22/2022 25  0 - 30 mm/Hr Final    Syphilis Ab Interpretation 11/22/2022 Non-Reactive  Non-Reactive Final    Free T4 11/22/2022 0.97  0.76 - 1.46 ng/dL Final    TSH 11/22/2022 3.300  0.358 - 3.740 uIU/mL Final    Lupus Anticoagulant Tech Interp 11/22/2022 SEE COMMENTS   Final    Prothrombin Time (PT), P 11/22/2022 11.5  9.4 - 12.5 sec Final    INR 11/22/2022 1.0  0.9 - 1.1 Final    Activated Partial Thrombopl Time, P 11/22/2022 32  25 - 37 sec Final    DRVVT Screen Ratio 11/22/2022 0.93  <1.20 ratio Final    Vitamin D 25-Hydroxy, Blood 11/22/2022 23.9  ng/mL Final    Hepatitis C Ab 11/22/2022 Non-Reactive  Non-Reactive Final    Uric Acid 11/22/2022 4.9  2.6  - 6.0 mg/dL Final    Antistreptolysin O (ASO) 11/22/2022 16.0  0.0 - 408.0 IU/mL Final    Anti-dsDNA 11/22/2022 Negative  Negative Final    Anti-DSDNA (IU) 11/22/2022 5  0 - 24 IU Final    West Nile Virus Ab, IgG 11/22/2022 Negative  Negative Final    West Nile Virus Ab, IgM 11/22/2022 Negative  Negative Final    West Nile Serum Interpretation 11/22/2022 SEE COMMENTS   Final    C3 11/22/2022 199 (H)  90 - 180 mg/dL Final    C4 11/22/2022 35  10 - 40 mg/dL Final    Complement, Total 11/22/2022 68  30 - 75 U/mL Final    CCP 11/22/2022 <16.0  <=19.9 units Final    Anaplasma phagocytophilum Ab, IgG,S 11/22/2022 <1:64  <1:64 titer Final    Ehrlichia Chaffeensis (HME) Ab, IgG 11/22/2022 <1:64  <1:64 titer Final    Spotted Fever Group Ab, IgG, S 11/22/2022 <1:64  <1:64 Final    Spotted Fever Group Ab, IgM, S 11/22/2022 <1:64  <1:64 Final    Lyme IgG/IgM 11/22/2022 Non-Reactive  Non-Reactive Final    CK 11/22/2022 61  26 - 192 U/L Final    WBC 11/22/2022 5.91  4.50 - 11.00 K/uL Final    RBC 11/22/2022 4.22  4.20 - 5.40 M/uL Final    Hemoglobin 11/22/2022 12.0  12.0 - 16.0 g/dL Final    Hematocrit 11/22/2022 38.0  38.0 - 47.0 % Final    MCV 11/22/2022 90.0  80.0 - 96.0 fL Final    MCH 11/22/2022 28.4  27.0 - 31.0 pg Final    MCHC 11/22/2022 31.6 (L)  32.0 - 36.0 g/dL Final    RDW 11/22/2022 13.9  11.5 - 14.5 % Final    Platelet Count 11/22/2022 388  150 - 400 K/uL Final    MPV 11/22/2022 10.0  9.4 - 12.4 fL Final    Neutrophils % 11/22/2022 56.5  53.0 - 65.0 % Final    Lymphocytes % 11/22/2022 35.2  27.0 - 41.0 % Final    Monocytes % 11/22/2022 6.1 (H)  2.0 - 6.0 % Final    Eosinophils % 11/22/2022 1.5  1.0 - 4.0 % Final    Basophils % 11/22/2022 0.5  0.0 - 1.0 % Final    Immature Granulocytes % 11/22/2022 0.2  0.0 - 0.4 % Final    nRBC, Auto 11/22/2022 0.0  <=0.0 % Final    Neutrophils, Abs 11/22/2022 3.34  1.80 - 7.70 K/uL Final    Lymphocytes, Absolute 11/22/2022 2.08  1.00 - 4.80 K/uL Final    Monocytes, Absolute  11/22/2022 0.36  0.00 - 0.80 K/uL Final    Eosinophils, Absolute 11/22/2022 0.09  0.00 - 0.50 K/uL Final    Basophils, Absolute 11/22/2022 0.03  0.00 - 0.20 K/uL Final    Immature Granulocytes, Absolute 11/22/2022 0.01  0.00 - 0.04 K/uL Final    nRBC, Absolute 11/22/2022 0.00  <=0.00 x10e3/uL Final    Diff Type 11/22/2022 Auto   Final    HLA-B27 Result 11/22/2022 Negative  Not Applicable Final    Interpretation 11/22/2022 SEE COMMENTS   Final   Office Visit on 11/22/2022   Component Date Value Ref Range Status    POC Amphetamines 11/22/2022 Negative  Negative, Inconclusive Final-Edited    POC Barbiturates 11/22/2022 Negative  Negative, Inconclusive Final-Edited    POC Benzodiazepines 11/22/2022 Negative  Negative, Inconclusive Final-Edited    POC Cocaine 11/22/2022 Negative  Negative, Inconclusive Final-Edited    POC THC 11/22/2022 Negative  Negative, Inconclusive Final-Edited    POC Methadone 11/22/2022 Negative  Negative, Inconclusive Final-Edited    POC Methamphetamine 11/22/2022 Negative  Negative, Inconclusive Final-Edited    POC Opiates 11/22/2022 Negative  Negative, Inconclusive Corrected    POC Oxycodone 11/22/2022 Presumptive Positive (A)  Negative, Inconclusive Corrected    POC Phencyclidine 11/22/2022 Negative  Negative, Inconclusive Final-Edited    POC Methylenedioxymethamphetamine * 11/22/2022 Negative  Negative, Inconclusive Final-Edited    POC Tricyclic Antidepressants 11/22/2022 Negative  Negative, Inconclusive Final-Edited    POC Buprenorphine 11/22/2022 Negative   Final-Edited     Acceptable 11/22/2022 Yes   Final-Edited   Lab Visit on 10/05/2022   Component Date Value Ref Range Status    CRP 10/05/2022 4.48 (H)  0.00 - 0.80 mg/dL Final    ESR Westergren 10/05/2022 37 (H)  0 - 30 mm/Hr Final    ISACC Screen 10/05/2022 Negative  Negative Final    WBC 10/05/2022 5.89  4.50 - 11.00 K/uL Final    RBC 10/05/2022 4.29  4.20 - 5.40 M/uL Final    Hemoglobin 10/05/2022 12.3  12.0 - 16.0 g/dL  Final    Hematocrit 10/05/2022 37.4 (L)  38.0 - 47.0 % Final    MCV 10/05/2022 87.2  80.0 - 96.0 fL Final    MCH 10/05/2022 28.7  27.0 - 31.0 pg Final    MCHC 10/05/2022 32.9  32.0 - 36.0 g/dL Final    RDW 10/05/2022 13.9  11.5 - 14.5 % Final    Platelet Count 10/05/2022 357  150 - 400 K/uL Final    MPV 10/05/2022 9.6  9.4 - 12.4 fL Final    Neutrophils % 10/05/2022 54.7  53.0 - 65.0 % Final    Lymphocytes % 10/05/2022 36.5  27.0 - 41.0 % Final    Monocytes % 10/05/2022 6.1 (H)  2.0 - 6.0 % Final    Eosinophils % 10/05/2022 2.2  1.0 - 4.0 % Final    Basophils % 10/05/2022 0.3  0.0 - 1.0 % Final    Immature Granulocytes % 10/05/2022 0.2  0.0 - 0.4 % Final    nRBC, Auto 10/05/2022 0.0  <=0.0 % Final    Neutrophils, Abs 10/05/2022 3.22  1.80 - 7.70 K/uL Final    Lymphocytes, Absolute 10/05/2022 2.15  1.00 - 4.80 K/uL Final    Monocytes, Absolute 10/05/2022 0.36  0.00 - 0.80 K/uL Final    Eosinophils, Absolute 10/05/2022 0.13  0.00 - 0.50 K/uL Final    Basophils, Absolute 10/05/2022 0.02  0.00 - 0.20 K/uL Final    Immature Granulocytes, Absolute 10/05/2022 0.01  0.00 - 0.04 K/uL Final    nRBC, Absolute 10/05/2022 0.00  <=0.00 x10e3/uL Final    Diff Type 10/05/2022 Auto   Final         Orders Placed This Encounter   Procedures    POCT Urine Drug Screen Presump     Interpretive Information:     Negative:  No drug detected at the cut off level.   Positive:  This result represents presumptive positive for the   tested drug, other substances may yield a positive response other   than the analyte of interest. This result should be utilized for   diagnostic purpose only. Confirmation testing will be performed upon physician request only.            Requested Prescriptions     Signed Prescriptions Disp Refills    gabapentin (NEURONTIN) 400 MG capsule 60 capsule 1     Sig: Take 1 capsule (400 mg total) by mouth every 12 (twelve) hours.    oxyCODONE-acetaminophen (PERCOCET)  mg per tablet 90 tablet 0     Sig: Take 1 tablet  by mouth every 8 (eight) hours as needed for Pain.    oxyCODONE-acetaminophen (PERCOCET)  mg per tablet 90 tablet 0     Sig: Take 1 tablet by mouth every 8 (eight) hours as needed for Pain.       Assessment:     1. Lumbar radiculopathy, chronic    2. Chronic pain of right knee    3. Osteoarthrosis multiple sites, not specified as generalized    4. Chronic inflammatory arthritis    5. Encounter for long-term (current) use of other medications         A's of Opioid Risk Assessment  Activity:Patient can perform ADL.   Analgesia:Patients pain is partially controlled by current medication. Patient has tried OTC medications such as Tylenol and Ibuprofen with out relief.   Adverse Effects: Patient denies constipation or sedation.  Aberrant Behavior:  reviewed with no aberrant drug seeking/taking behavior.  Overdose reversal drug naloxone discussed    Drug screen reviewed      Plan:    Narcan January 2023 November 23, 2022 ESR elevated pending rheumatology evaluation today [facility]    Vitamin-D level 23, will start vitamin-D replacement November 2022    She had bilateral lumbar L4/5 TFESI # 2 March 22, 2022  She states she had 80% relief after procedure she states that procedure did help increase her level function she has had more than 3 months pain relief following her procedure    Could not afford co-pay for any further procedures    Complain multiple areas joint pain back pain leg pain radicular in nature     She will see rheumatology today discuss options    Continue current medication    Continue home exercise program as directed    Follow-up 2 months    Dr. Soto, March 2023    Bring original prescription medication bottles/container/box with labels to each visit    Pill count    Physical therapy    Massage therapy declines

## 2023-03-22 ENCOUNTER — OFFICE VISIT (OUTPATIENT)
Dept: RHEUMATOLOGY | Facility: CLINIC | Age: 58
End: 2023-03-22
Payer: MEDICARE

## 2023-03-22 ENCOUNTER — HOSPITAL ENCOUNTER (OUTPATIENT)
Dept: RADIOLOGY | Facility: HOSPITAL | Age: 58
Discharge: HOME | End: 2023-03-22
Attending: INTERNAL MEDICINE
Payer: MEDICARE

## 2023-03-22 ENCOUNTER — OFFICE VISIT (OUTPATIENT)
Dept: PAIN MEDICINE | Facility: CLINIC | Age: 58
End: 2023-03-22
Payer: MEDICARE

## 2023-03-22 VITALS
WEIGHT: 203 LBS | HEART RATE: 85 BPM | RESPIRATION RATE: 18 BRPM | HEIGHT: 67 IN | DIASTOLIC BLOOD PRESSURE: 78 MMHG | BODY MASS INDEX: 31.86 KG/M2 | SYSTOLIC BLOOD PRESSURE: 138 MMHG | OXYGEN SATURATION: 95 %

## 2023-03-22 VITALS
BODY MASS INDEX: 31.86 KG/M2 | SYSTOLIC BLOOD PRESSURE: 152 MMHG | DIASTOLIC BLOOD PRESSURE: 75 MMHG | HEART RATE: 92 BPM | RESPIRATION RATE: 18 BRPM | WEIGHT: 203 LBS | HEIGHT: 67 IN

## 2023-03-22 DIAGNOSIS — M19.072 ARTHROSIS OF LEFT MIDFOOT: ICD-10-CM

## 2023-03-22 DIAGNOSIS — M19.072 ARTHROSIS OF LEFT MIDFOOT: Primary | ICD-10-CM

## 2023-03-22 DIAGNOSIS — M19.90 CHRONIC INFLAMMATORY ARTHRITIS: ICD-10-CM

## 2023-03-22 DIAGNOSIS — Z79.899 ENCOUNTER FOR LONG-TERM (CURRENT) USE OF OTHER MEDICATIONS: ICD-10-CM

## 2023-03-22 DIAGNOSIS — M25.50 POLYARTHRALGIA: Chronic | ICD-10-CM

## 2023-03-22 DIAGNOSIS — M54.16 LUMBAR RADICULOPATHY, CHRONIC: Primary | Chronic | ICD-10-CM

## 2023-03-22 DIAGNOSIS — M89.49 OSTEOARTHROSIS MULTIPLE SITES, NOT SPECIFIED AS GENERALIZED: Chronic | ICD-10-CM

## 2023-03-22 DIAGNOSIS — G89.29 CHRONIC PAIN OF RIGHT KNEE: Chronic | ICD-10-CM

## 2023-03-22 DIAGNOSIS — M25.561 CHRONIC PAIN OF RIGHT KNEE: Chronic | ICD-10-CM

## 2023-03-22 PROCEDURE — 99215 OFFICE O/P EST HI 40 MIN: CPT | Mod: PBBFAC | Performed by: PHYSICIAN ASSISTANT

## 2023-03-22 PROCEDURE — 73620 X-RAY EXAM OF FOOT: CPT | Mod: TC,LT

## 2023-03-22 PROCEDURE — 3075F SYST BP GE 130 - 139MM HG: CPT | Mod: CPTII,,, | Performed by: INTERNAL MEDICINE

## 2023-03-22 PROCEDURE — 3078F DIAST BP <80 MM HG: CPT | Mod: CPTII,,, | Performed by: INTERNAL MEDICINE

## 2023-03-22 PROCEDURE — 3078F DIAST BP <80 MM HG: CPT | Mod: CPTII,,, | Performed by: PHYSICIAN ASSISTANT

## 2023-03-22 PROCEDURE — 73600 XR ANKLE 2 VIEW LEFT: ICD-10-PCS | Mod: 26,LT,, | Performed by: RADIOLOGY

## 2023-03-22 PROCEDURE — 99214 PR OFFICE/OUTPT VISIT, EST, LEVL IV, 30-39 MIN: ICD-10-PCS | Mod: S$PBB,,, | Performed by: PHYSICIAN ASSISTANT

## 2023-03-22 PROCEDURE — 99214 OFFICE O/P EST MOD 30 MIN: CPT | Mod: S$PBB,,, | Performed by: PHYSICIAN ASSISTANT

## 2023-03-22 PROCEDURE — 3008F BODY MASS INDEX DOCD: CPT | Mod: CPTII,,, | Performed by: INTERNAL MEDICINE

## 2023-03-22 PROCEDURE — 1159F PR MEDICATION LIST DOCUMENTED IN MEDICAL RECORD: ICD-10-PCS | Mod: CPTII,,, | Performed by: PHYSICIAN ASSISTANT

## 2023-03-22 PROCEDURE — 3078F PR MOST RECENT DIASTOLIC BLOOD PRESSURE < 80 MM HG: ICD-10-PCS | Mod: CPTII,,, | Performed by: INTERNAL MEDICINE

## 2023-03-22 PROCEDURE — 3078F PR MOST RECENT DIASTOLIC BLOOD PRESSURE < 80 MM HG: ICD-10-PCS | Mod: CPTII,,, | Performed by: PHYSICIAN ASSISTANT

## 2023-03-22 PROCEDURE — 99204 OFFICE O/P NEW MOD 45 MIN: CPT | Mod: S$PBB,,, | Performed by: INTERNAL MEDICINE

## 2023-03-22 PROCEDURE — 73600 X-RAY EXAM OF ANKLE: CPT | Mod: TC,LT

## 2023-03-22 PROCEDURE — 99214 OFFICE O/P EST MOD 30 MIN: CPT | Mod: PBBFAC | Performed by: INTERNAL MEDICINE

## 2023-03-22 PROCEDURE — 3077F PR MOST RECENT SYSTOLIC BLOOD PRESSURE >= 140 MM HG: ICD-10-PCS | Mod: CPTII,,, | Performed by: PHYSICIAN ASSISTANT

## 2023-03-22 PROCEDURE — 80305 DRUG TEST PRSMV DIR OPT OBS: CPT | Mod: PBBFAC | Performed by: PHYSICIAN ASSISTANT

## 2023-03-22 PROCEDURE — 73620 XR FOOT 2 VIEW LEFT: ICD-10-PCS | Mod: 26,LT,, | Performed by: RADIOLOGY

## 2023-03-22 PROCEDURE — 3008F PR BODY MASS INDEX (BMI) DOCUMENTED: ICD-10-PCS | Mod: CPTII,,, | Performed by: INTERNAL MEDICINE

## 2023-03-22 PROCEDURE — 4010F PR ACE/ARB THEARPY RXD/TAKEN: ICD-10-PCS | Mod: CPTII,,, | Performed by: INTERNAL MEDICINE

## 2023-03-22 PROCEDURE — 73600 X-RAY EXAM OF ANKLE: CPT | Mod: 26,LT,, | Performed by: RADIOLOGY

## 2023-03-22 PROCEDURE — 4010F PR ACE/ARB THEARPY RXD/TAKEN: ICD-10-PCS | Mod: CPTII,,, | Performed by: PHYSICIAN ASSISTANT

## 2023-03-22 PROCEDURE — 3077F SYST BP >= 140 MM HG: CPT | Mod: CPTII,,, | Performed by: PHYSICIAN ASSISTANT

## 2023-03-22 PROCEDURE — 3075F PR MOST RECENT SYSTOLIC BLOOD PRESS GE 130-139MM HG: ICD-10-PCS | Mod: CPTII,,, | Performed by: INTERNAL MEDICINE

## 2023-03-22 PROCEDURE — 4010F ACE/ARB THERAPY RXD/TAKEN: CPT | Mod: CPTII,,, | Performed by: PHYSICIAN ASSISTANT

## 2023-03-22 PROCEDURE — 99204 PR OFFICE/OUTPT VISIT, NEW, LEVL IV, 45-59 MIN: ICD-10-PCS | Mod: S$PBB,,, | Performed by: INTERNAL MEDICINE

## 2023-03-22 PROCEDURE — 3008F PR BODY MASS INDEX (BMI) DOCUMENTED: ICD-10-PCS | Mod: CPTII,,, | Performed by: PHYSICIAN ASSISTANT

## 2023-03-22 PROCEDURE — 3008F BODY MASS INDEX DOCD: CPT | Mod: CPTII,,, | Performed by: PHYSICIAN ASSISTANT

## 2023-03-22 PROCEDURE — 1159F MED LIST DOCD IN RCRD: CPT | Mod: CPTII,,, | Performed by: PHYSICIAN ASSISTANT

## 2023-03-22 PROCEDURE — 73620 X-RAY EXAM OF FOOT: CPT | Mod: 26,LT,, | Performed by: RADIOLOGY

## 2023-03-22 PROCEDURE — 4010F ACE/ARB THERAPY RXD/TAKEN: CPT | Mod: CPTII,,, | Performed by: INTERNAL MEDICINE

## 2023-03-22 RX ORDER — HYDRALAZINE HYDROCHLORIDE 100 MG/1
100 TABLET, FILM COATED ORAL
COMMUNITY
Start: 2022-04-07 | End: 2023-03-22 | Stop reason: SDUPTHER

## 2023-03-22 RX ORDER — AMLODIPINE BESYLATE 10 MG/1
1 TABLET ORAL DAILY
COMMUNITY
Start: 2022-04-07

## 2023-03-22 RX ORDER — ACETAMINOPHEN 500 MG
TABLET ORAL
COMMUNITY
Start: 2022-09-20

## 2023-03-22 RX ORDER — GABAPENTIN 400 MG/1
400 CAPSULE ORAL EVERY 12 HOURS
Qty: 60 CAPSULE | Refills: 1 | Status: SHIPPED | OUTPATIENT
Start: 2023-03-22 | End: 2023-05-17 | Stop reason: SDUPTHER

## 2023-03-22 RX ORDER — DORZOLAMIDE HCL 20 MG/ML
SOLUTION/ DROPS OPHTHALMIC
COMMUNITY
End: 2023-03-22 | Stop reason: SDUPTHER

## 2023-03-22 RX ORDER — POTASSIUM CHLORIDE 1500 MG/1
20 TABLET, EXTENDED RELEASE ORAL
COMMUNITY
Start: 2022-12-12 | End: 2023-03-22 | Stop reason: SDUPTHER

## 2023-03-22 RX ORDER — NETARSUDIL 0.2 MG/ML
SOLUTION/ DROPS OPHTHALMIC; TOPICAL
COMMUNITY

## 2023-03-22 RX ORDER — METFORMIN HYDROCHLORIDE 1000 MG/1
1000 TABLET ORAL
COMMUNITY
Start: 2022-12-12 | End: 2023-03-22 | Stop reason: SDUPTHER

## 2023-03-22 RX ORDER — AMLODIPINE BESYLATE 10 MG/1
TABLET ORAL
COMMUNITY
Start: 2023-01-11 | End: 2023-03-22 | Stop reason: SDUPTHER

## 2023-03-22 RX ORDER — LANOLIN ALCOHOL/MO/W.PET/CERES
CREAM (GRAM) TOPICAL
COMMUNITY

## 2023-03-22 RX ORDER — OXYCODONE AND ACETAMINOPHEN 10; 325 MG/1; MG/1
1 TABLET ORAL EVERY 8 HOURS PRN
Qty: 90 TABLET | Refills: 0 | Status: SHIPPED | OUTPATIENT
Start: 2023-03-23 | End: 2023-04-22

## 2023-03-22 RX ORDER — TAFLUPROST OPTHALMIC 0 MG/.3ML
SOLUTION/ DROPS OPHTHALMIC
COMMUNITY
Start: 2023-03-20 | End: 2023-03-22 | Stop reason: SDUPTHER

## 2023-03-22 RX ORDER — MELOXICAM 15 MG/1
15 TABLET ORAL DAILY
Qty: 45 TABLET | Refills: 1 | Status: SHIPPED | OUTPATIENT
Start: 2023-03-22

## 2023-03-22 RX ORDER — OMEPRAZOLE 40 MG/1
40 CAPSULE, DELAYED RELEASE ORAL
COMMUNITY
Start: 2022-12-12 | End: 2023-03-22 | Stop reason: SDUPTHER

## 2023-03-22 RX ORDER — TRAVOPROST OPHTHALMIC SOLUTION 0.04 MG/ML
SOLUTION OPHTHALMIC
COMMUNITY
End: 2023-03-22 | Stop reason: SDUPTHER

## 2023-03-22 RX ORDER — OXYCODONE AND ACETAMINOPHEN 10; 325 MG/1; MG/1
1 TABLET ORAL EVERY 8 HOURS PRN
Qty: 90 TABLET | Refills: 0 | Status: SHIPPED | OUTPATIENT
Start: 2023-04-22 | End: 2023-05-17 | Stop reason: SDUPTHER

## 2023-03-22 RX ORDER — ASPIRIN 81 MG/1
1 TABLET ORAL
COMMUNITY
End: 2023-03-22 | Stop reason: SDUPTHER

## 2023-03-22 RX ORDER — ACETAMINOPHEN 500 MG
TABLET ORAL
COMMUNITY

## 2023-03-22 RX ORDER — TAFLUPROST OPTHALMIC 0 MG/.3ML
SOLUTION/ DROPS OPHTHALMIC
COMMUNITY
Start: 2022-12-07

## 2023-03-22 NOTE — PROGRESS NOTES
Mary Jordan MD   [facility]  [address] [place] [address]  [phone]      PATIENT NAME: Audrey Cauesy  : 1965  DATE: 3/22/23  MRN: [id number]      Billing Provider: Mary Jordan MD  Level of Service:   Patient PCP Information       Provider PCP Type    Carissa Barker MD General            Reason for Visit / Chief Complaint: No chief complaint on file.           Assessment and Plan (including Health Maintenance)      Problem List  Smart Sets  Document Outside HM   :    Plan:     Primarily working up for left mid-foot localized swelling which may reflect local arthrosis vs CPPD/gout.   Back pain management per Pain clinic.   Will start on mobic 15 for 30 days.   X-ray of left foot and ankle.       ICD-10-CM ICD-9-CM    1. Arthrosis of left midfoot  M19.072 715.97 meloxicam (MOBIC) 15 MG tablet      X-Ray Foot 2 View Left      X-Ray Ankle 2 View Left      Uric Acid      2. Polyarthralgia  M25.50 719.49 Ambulatory referral/consult to Rheumatology      Hepatitis C Antibody      Hepatitis B Surface Antigen      Hepatitis B Surface Ab, Qualitative      Quantiferon Gold TB      Cyclic Citrullinated Peptide Antibody, IgG      Rheumatoid Quantitative      C-Reactive Protein      Sedimentation Rate      3. Chronic inflammatory arthritis  M19.90 714.9 Ambulatory referral/consult to Rheumatology           Polyarthralgia  -     Ambulatory referral/consult to Rheumatology    Chronic inflammatory arthritis  -     Ambulatory referral/consult to Rheumatology             Total time spent in Assessment, Co-ordination of Care , Review of Care Plan , Goal Setting and Counseling on this initial visit is ~ 60 minutes     There is currently no information documented on the homunculus. Go to the Rheumatology activity and complete the homunculus joint exam.               History of Present Illness / Problem Focused Workflow     Audrey Causey presents to the  clinic with No chief complaint on file.     Patient states that she has 'pain all over' including knees and ankles. Has h/o carpal tunnel syndrome b/l   No morning stiffness in hands.   Primary concern today is with left mid-foot region. She has swelling along the dorsum of the left foot.     Reviewed MRI of lumbar spine;  L5-S1: Mild-to-moderate loss of disc space height.  Minimal diffuse disc bulging.  Mild posterior facet arthropathy.  Minimal spinal canal narrowing.  Mild bilateral neural foraminal narrowing.  Impression: Mild degenerative change of the lumbar spine with mild spinal canal and neuroforaminal narrowing as detailed above.        Review of Systems     Review of Systems    Medical / Social / Family History     Past Medical History:   Diagnosis Date    Acute superficial gastritis without hemorrhage 2021    Arthritis     Diabetes mellitus     Diverticula, colon 2021    Esophageal dysphagia 2021    GERD (gastroesophageal reflux disease)     Hypertension     Low back pain     Lumbar radiculopathy     Seizures     Sleep apnea     Unspecified glaucoma        Past Surgical History:   Procedure Laterality Date     SECTION      COLONOSCOPY  2018    Dr. Naranjo    EPIDURAL STEROID INJECTION  2018    L4-5 MARCOS X 5 - Cook    EPIDURAL STEROID INJECTION  2017    L5-S1 MARCOS - Cook    EPIDURAL STEROID INJECTION N/A 2022    Procedure: Injection, Steroid, Epidural, L4/5;  Surgeon: Yari Soto MD;  Location: [facility];  Service: Pain Management;  Laterality: N/A;  MESSAGE LEFT ON VM TO BE TESTED ON 1-14    ESOPHAGOGASTRODUODENOSCOPY  2018    EYE SURGERY      GALLBLADDER SURGERY      HYSTERECTOMY      INJECTION OF FACET JOINT Bilateral 2017    Bilateral L3-S1 Facet Injection - Cook    KELOID EXCISION Bilateral 2023    Procedure: EXCISION, KELOID ON BILATERAL EARLOBES;  Surgeon: Moreno Xiong MD;  Location: [facility]THO OR;  Service: ENT;   Laterality: Bilateral;    LAPAROSCOPIC CHOLECYSTECTOMY N/A 07/22/2021    Procedure: CHOLECYSTECTOMY, LAPAROSCOPIC;  Surgeon: Amy Wagner MD;  Location: [facility];  Service: General;  Laterality: N/A;  fully vaccinated    OOPHORECTOMY      TRANSFORAMINAL EPIDURAL INJECTION OF STEROID Bilateral 03/22/2022    Procedure: Injection,steroid,epidural,transforaminal approach, L4/5;  Surgeon: Yari Soto MD;  Location: [facility];  Service: Pain Management;  Laterality: Bilateral;  pt aware on ov to be tested       Social History  Ms. Audrey Causey  reports that she has never smoked. She has never used smokeless tobacco. She reports current drug use. Drug: Oxycodone. She reports that she does not drink alcohol.    Family History  Ms.'s Audrey Causey family history includes Breast cancer in her mother; Diabetes type II in her mother; Heart attack in her mother; Hypertension in her mother; Stroke in her mother; Thyroid cancer in her mother.    Medications and Allergies     Medications  Outpatient Medications Marked as Taking for the 3/22/23 encounter (Office Visit) with Mary Jordan MD   Medication Sig Dispense Refill    amLODIPine (NORVASC) 10 MG tablet Take 1 tablet by mouth once daily.      blood pressure monitor Kit Use to monitor blood pressure as directed      tafluprost, PF, (ZIOPTAN, PF,) 0.0015 % Dpet AS DIRECTED      [DISCONTINUED] hydrALAZINE (APRESOLINE) 100 MG tablet Take 100 mg by mouth.      [DISCONTINUED] metFORMIN (GLUCOPHAGE) 1000 MG tablet Take 1,000 mg by mouth.      [DISCONTINUED] omeprazole (PRILOSEC) 40 MG capsule Take 40 mg by mouth.      [DISCONTINUED] potassium chloride (K-TAB) 20 mEq Take 20 mg by mouth.         Allergies  Review of patient's allergies indicates:  No Known Allergies    Physical Examination   There were no vitals filed for this visit.  Physical Exam  Musculoskeletal:         General: Swelling and tenderness present.      Comments: Left midfoot pain               Signature:  Mary Jordan MD  [facility]  [address] [facility] [address]  [phone]    Date of encounter: 3/22/23

## 2023-05-17 NOTE — PROGRESS NOTES
Subjective:         Patient ID: Audrey Causey is a 57 y.o. female.    Chief Complaint: Low-back Pain        Pain  This is a chronic problem. The current episode started more than 1 year ago. The problem occurs daily. The problem has been unchanged. Associated symptoms include arthralgias and neck pain. Pertinent negatives include no anorexia, chest pain, chills, coughing, diaphoresis, fever, sore throat, vertigo or vomiting.   Review of Systems   Constitutional:  Negative for activity change, appetite change, chills, diaphoresis, fever and unexpected weight change.   HENT:  Negative for drooling, ear discharge, ear pain, facial swelling, nosebleeds, sore throat, trouble swallowing, voice change and goiter.    Eyes:  Negative for photophobia, pain, discharge, redness and visual disturbance.   Respiratory:  Negative for apnea, cough, choking, chest tightness, shortness of breath, wheezing and stridor.    Cardiovascular:  Negative for chest pain, palpitations and leg swelling.   Gastrointestinal:  Negative for abdominal distention, anorexia, diarrhea, rectal pain, vomiting and fecal incontinence.   Endocrine: Negative for cold intolerance, heat intolerance, polydipsia, polyphagia and polyuria.   Genitourinary:  Negative for bladder incontinence, dysuria, flank pain, frequency and hot flashes.   Musculoskeletal:  Positive for arthralgias, back pain, leg pain, neck pain and neck stiffness.   Integumentary:  Negative for color change and pallor.   Allergic/Immunologic: Negative for immunocompromised state.   Neurological:  Negative for dizziness, vertigo, seizures, syncope, facial asymmetry, speech difficulty, light-headedness, coordination difficulties, memory loss and coordination difficulties.   Hematological:  Negative for adenopathy. Does not bruise/bleed easily.   Psychiatric/Behavioral:  Negative for agitation, behavioral problems, confusion, decreased concentration, dysphoric mood, hallucinations, self-injury and  suicidal ideas. The patient is not nervous/anxious and is not hyperactive.          Past Medical History:   Diagnosis Date    Acute superficial gastritis without hemorrhage 2021    Arthritis     Diabetes mellitus     Diverticula, colon 2021    Esophageal dysphagia 2021    GERD (gastroesophageal reflux disease)     Hypertension     Low back pain     Lumbar radiculopathy     Seizures     Sleep apnea     Unspecified glaucoma      Past Surgical History:   Procedure Laterality Date     SECTION      COLONOSCOPY  2018    Dr. Naranjo    EPIDURAL STEROID INJECTION  2018    L4-5 MARCOS X 5 - Cook    EPIDURAL STEROID INJECTION  2017    L5-S1 MARCOS - Cook    EPIDURAL STEROID INJECTION N/A 2022    Procedure: Injection, Steroid, Epidural, L4/5;  Surgeon: Yari Soto MD;  Location: [facility];  Service: Pain Management;  Laterality: N/A;  MESSAGE LEFT ON VM TO BE TESTED ON 1-14    ESOPHAGOGASTRODUODENOSCOPY  2018    EYE SURGERY      GALLBLADDER SURGERY      HYSTERECTOMY      INJECTION OF FACET JOINT Bilateral 2017    Bilateral L3-S1 Facet Injection - Cook    KELOID EXCISION Bilateral 2023    Procedure: EXCISION, KELOID ON BILATERAL EARLOBES;  Surgeon: Moreno Xiong MD;  Location: [facility] OR;  Service: ENT;  Laterality: Bilateral;    LAPAROSCOPIC CHOLECYSTECTOMY N/A 2021    Procedure: CHOLECYSTECTOMY, LAPAROSCOPIC;  Surgeon: Amy Wagner MD;  Location: [facility];  Service: General;  Laterality: N/A;  fully vaccinated    OOPHORECTOMY      TRANSFORAMINAL EPIDURAL INJECTION OF STEROID Bilateral 2022    Procedure: Injection,steroid,epidural,transforaminal approach, L4/5;  Surgeon: Yari Soto MD;  Location: [facility];  Service: Pain Management;  Laterality: Bilateral;  pt aware on ov to be tested     Social History     Socioeconomic History    Marital status:    Tobacco Use    Smoking status: Never    Smokeless  "tobacco: Never   Substance and Sexual Activity    Alcohol use: Never    Drug use: Yes     Types: Oxycodone     Family History   Problem Relation Age of Onset    Hypertension Mother     Breast cancer Mother     Thyroid cancer Mother     Diabetes type II Mother     Stroke Mother     Heart attack Mother      Review of patient's allergies indicates:  No Known Allergies     Objective:  Vitals:    05/22/23 0756   BP: 136/80   Pulse: 76   Resp: 18   Weight: 92.5 kg (204 lb)   Height: 5' 7" (1.702 m)   PainSc:   8           Physical Exam  Vitals and nursing note reviewed. Exam conducted with a chaperone present.   Constitutional:       General: She is awake.      Appearance: Normal appearance. She is not ill-appearing, toxic-appearing or diaphoretic.   HENT:      Head: Normocephalic and atraumatic.      Nose: Nose normal.      Mouth/Throat:      Mouth: Mucous membranes are moist.      Pharynx: Oropharynx is clear.   Eyes:      Conjunctiva/sclera: Conjunctivae normal.      Pupils: Pupils are equal, round, and reactive to light.   Cardiovascular:      Rate and Rhythm: Normal rate.   Pulmonary:      Effort: Pulmonary effort is normal. No respiratory distress.   Abdominal:      Palpations: Abdomen is soft.      Tenderness: There is no guarding.   Musculoskeletal:         General: Normal range of motion.      Cervical back: Normal range of motion and neck supple. Tenderness present. No rigidity.      Thoracic back: Tenderness present.      Lumbar back: Tenderness present.      Right knee: Tenderness present.      Left knee: Tenderness present.   Skin:     General: Skin is warm and dry.      Coloration: Skin is not jaundiced or pale.   Neurological:      General: No focal deficit present.      Mental Status: She is alert and oriented to person, place, and time. Mental status is at baseline.      Cranial Nerves: No cranial nerve deficit (II-XII).   Psychiatric:         Mood and Affect: Mood normal.         Behavior: Behavior " normal. Behavior is cooperative.         Thought Content: Thought content normal.         X-Ray Foot 2 View Left  Narrative: EXAMINATION:  XR FOOT 2 VIEW LEFT    CLINICAL HISTORY:  Primary osteoarthritis, left ankle and foot    TECHNIQUE:  AP and lateral views of the left foot    COMPARISON:  None    FINDINGS:  There is no fracture or dislocation.  Mild degenerative changes are seen throughout the foot.  Impression: Mild degenerative changes throughout the foot.    Electronically signed by: Chino Mckeon  Date:    03/22/2023  Time:    12:28  X-Ray Ankle 2 View Left  Narrative: EXAMINATION:  XR ANKLE 2 VIEW LEFT    CLINICAL HISTORY:  Primary osteoarthritis, left ankle and foot    COMPARISON:  None    TECHNIQUE:  Frontal and lateral views of the left ankle.    FINDINGS:  Mild plantar calcaneal spurring.  No convincing acute fracture or dislocation demonstrated. No concerning radiopaque foreign body visualized.  Mild soft tissue swelling about the medial ankle.  Impression: As above.    Point of Service: [facility]    Electronically signed by: Sonny Clancy  Date:    03/22/2023  Time:    11:52         Lab Visit on 03/22/2023   Component Date Value Ref Range Status    Hepatitis C Ab 03/22/2023 Non-Reactive  Non-Reactive Final    Hepatitis B Surface Ag 03/22/2023 Non-Reactive  Non-Reactive Final    Hepatitis B Surface Ab 03/22/2023 Non-Reactive  Non-Reactive Final    Nil Result, TB 03/22/2023 0.02   Final    TB1 Ag minus Nil Result 03/22/2023 0.00   Final    TB2 Ag minus Nil Result 03/22/2023 0.00   Final    Mitogen minus Nil Result, TB 03/22/2023 9.30   Final    QuantiFERON-Tb Gold Plus 03/22/2023 Negative  Negative Final    CCP 03/22/2023 <16.0  <=19.9 units Final    RA Titer 03/22/2023 <10  0 - 15 IU/mL Final    CRP 03/22/2023 2.80 (H)  0.00 - 0.80 mg/dL Final    ESR Westergren 03/22/2023 29  0 - 30 mm/Hr Final    Uric Acid 03/22/2023 4.7  2.6 - 6.0 mg/dL Final   Office Visit on 03/22/2023   Component Date  Value Ref Range Status    POC Amphetamines 03/22/2023 Negative  Negative, Inconclusive Final-Edited    POC Barbiturates 03/22/2023 Negative  Negative, Inconclusive Final-Edited    POC Benzodiazepines 03/22/2023 Negative  Negative, Inconclusive Final-Edited    POC Cocaine 03/22/2023 Negative  Negative, Inconclusive Final-Edited    POC THC 03/22/2023 Negative  Negative, Inconclusive Final-Edited    POC Methadone 03/22/2023 Negative  Negative, Inconclusive Final-Edited    POC Methamphetamine 03/22/2023 Negative  Negative, Inconclusive Final-Edited    POC Opiates 03/22/2023 Negative  Negative, Inconclusive Final-Edited    POC Oxycodone 03/22/2023 Presumptive Positive (A)  Negative, Inconclusive Final-Edited    POC Phencyclidine 03/22/2023 Negative  Negative, Inconclusive Final-Edited    POC Methylenedioxymethamphetamine * 03/22/2023 Negative  Negative, Inconclusive Final-Edited    POC Tricyclic Antidepressants 03/22/2023 Negative  Negative, Inconclusive Final-Edited    POC Buprenorphine 03/22/2023 Negative   Final-Edited     Acceptable 03/22/2023 Yes   Final-Edited    POC Temperature (Urine) 03/22/2023 92   Final-Edited   Admission on 01/17/2023, Discharged on 01/17/2023   Component Date Value Ref Range Status    POC Glucose 01/17/2023 159 (H)  70 - 105 mg/dL Final    Case Report 01/17/2023    Final                    Value:Surgical Pathology                                Case: [id number]                                   Authorizing Provider:  Moreno Xiong MD        Collected:           01/17/2023 08:52 AM          Ordering Location:     [facility] - Orthopedic      Received:            01/17/2023 10:22 AM                                 Periop Services                                                              Pathologist:           Steven Donovan MD                                                       Specimens:   A) - Ear, Right, 1 keloid from right ear lobe                                                        B) - Ear, Left, 2 keloid from left ear lobe                                                Final Diagnosis 01/17/2023    Final                    Value:This result contains rich text formatting which cannot be displayed here.    Gross Description 01/17/2023    Final                    Value:This result contains rich text formatting which cannot be displayed here.    Microscopic Description 01/17/2023    Final                    Value:This result contains rich text formatting which cannot be displayed here.    Laboratory Notes 01/17/2023    Final                    Value:This result contains rich text formatting which cannot be displayed here.    POC Glucose 01/17/2023 146 (H)  70 - 105 mg/dL Final         No orders of the defined types were placed in this encounter.        Requested Prescriptions     Signed Prescriptions Disp Refills    gabapentin (NEURONTIN) 400 MG capsule 60 capsule 1     Sig: Take 1 capsule (400 mg total) by mouth every 12 (twelve) hours.    oxyCODONE-acetaminophen (PERCOCET)  mg per tablet 90 tablet 0     Sig: Take 1 tablet by mouth every 8 (eight) hours as needed for Pain.    oxyCODONE-acetaminophen (PERCOCET)  mg per tablet 90 tablet 0     Sig: Take 1 tablet by mouth every 8 (eight) hours as needed for Pain.       Assessment:     1. Lumbar radiculopathy, chronic    2. Chronic pain of right knee    3. Osteoarthrosis multiple sites, not specified as generalized    4. Chronic inflammatory arthritis         A's of Opioid Risk Assessment  Activity:Patient can perform ADL.   Analgesia:Patients pain is partially controlled by current medication. Patient has tried OTC medications such as Tylenol and Ibuprofen with out relief.   Adverse Effects: Patient denies constipation or sedation.  Aberrant Behavior:  reviewed with no aberrant drug seeking/taking behavior.  Overdose reversal drug naloxone discussed    Drug screen reviewed      Plan:    Narcan January  2023    Follows rheumatology [facility] inflammatory arthritis    November 23, 2022 ESR elevated pending rheumatology evaluation today [facility]    Vitamin-D level 23, will start vitamin-D replacement November 2022    She had bilateral lumbar L4/5 TFESI # 2 March 22, 2022  She states she had 80% relief after procedure she states that procedure did help increase her level function she has had more than 3 months pain relief following her procedure    Could not afford co-pay for any further procedures    She states current medication does help with her chronic discomfort    Continue current medication    Continue home exercise program as directed    Follow-up 2 months    Dr. Soto, March 2023    Bring original prescription medication bottles/container/box with labels to each visit

## 2023-05-22 ENCOUNTER — OFFICE VISIT (OUTPATIENT)
Dept: PAIN MEDICINE | Facility: CLINIC | Age: 58
End: 2023-05-22
Payer: MEDICARE

## 2023-05-22 VITALS
HEIGHT: 67 IN | WEIGHT: 204 LBS | DIASTOLIC BLOOD PRESSURE: 80 MMHG | BODY MASS INDEX: 32.02 KG/M2 | RESPIRATION RATE: 18 BRPM | HEART RATE: 76 BPM | SYSTOLIC BLOOD PRESSURE: 136 MMHG

## 2023-05-22 DIAGNOSIS — M19.90 CHRONIC INFLAMMATORY ARTHRITIS: ICD-10-CM

## 2023-05-22 DIAGNOSIS — M54.16 LUMBAR RADICULOPATHY, CHRONIC: Primary | Chronic | ICD-10-CM

## 2023-05-22 DIAGNOSIS — G89.29 CHRONIC PAIN OF RIGHT KNEE: Chronic | ICD-10-CM

## 2023-05-22 DIAGNOSIS — M89.49 OSTEOARTHROSIS MULTIPLE SITES, NOT SPECIFIED AS GENERALIZED: Chronic | ICD-10-CM

## 2023-05-22 DIAGNOSIS — M25.561 CHRONIC PAIN OF RIGHT KNEE: Chronic | ICD-10-CM

## 2023-05-22 PROCEDURE — 3075F SYST BP GE 130 - 139MM HG: CPT | Mod: CPTII,,, | Performed by: PHYSICIAN ASSISTANT

## 2023-05-22 PROCEDURE — 3008F PR BODY MASS INDEX (BMI) DOCUMENTED: ICD-10-PCS | Mod: CPTII,,, | Performed by: PHYSICIAN ASSISTANT

## 2023-05-22 PROCEDURE — 99214 PR OFFICE/OUTPT VISIT, EST, LEVL IV, 30-39 MIN: ICD-10-PCS | Mod: S$PBB,,, | Performed by: PHYSICIAN ASSISTANT

## 2023-05-22 PROCEDURE — 4010F ACE/ARB THERAPY RXD/TAKEN: CPT | Mod: CPTII,,, | Performed by: PHYSICIAN ASSISTANT

## 2023-05-22 PROCEDURE — 4010F PR ACE/ARB THEARPY RXD/TAKEN: ICD-10-PCS | Mod: CPTII,,, | Performed by: PHYSICIAN ASSISTANT

## 2023-05-22 PROCEDURE — 99214 OFFICE O/P EST MOD 30 MIN: CPT | Mod: S$PBB,,, | Performed by: PHYSICIAN ASSISTANT

## 2023-05-22 PROCEDURE — 1159F MED LIST DOCD IN RCRD: CPT | Mod: CPTII,,, | Performed by: PHYSICIAN ASSISTANT

## 2023-05-22 PROCEDURE — 3008F BODY MASS INDEX DOCD: CPT | Mod: CPTII,,, | Performed by: PHYSICIAN ASSISTANT

## 2023-05-22 PROCEDURE — 1159F PR MEDICATION LIST DOCUMENTED IN MEDICAL RECORD: ICD-10-PCS | Mod: CPTII,,, | Performed by: PHYSICIAN ASSISTANT

## 2023-05-22 PROCEDURE — 3079F DIAST BP 80-89 MM HG: CPT | Mod: CPTII,,, | Performed by: PHYSICIAN ASSISTANT

## 2023-05-22 PROCEDURE — 99215 OFFICE O/P EST HI 40 MIN: CPT | Mod: PBBFAC | Performed by: PHYSICIAN ASSISTANT

## 2023-05-22 PROCEDURE — 3075F PR MOST RECENT SYSTOLIC BLOOD PRESS GE 130-139MM HG: ICD-10-PCS | Mod: CPTII,,, | Performed by: PHYSICIAN ASSISTANT

## 2023-05-22 PROCEDURE — 3079F PR MOST RECENT DIASTOLIC BLOOD PRESSURE 80-89 MM HG: ICD-10-PCS | Mod: CPTII,,, | Performed by: PHYSICIAN ASSISTANT

## 2023-05-22 RX ORDER — OXYCODONE AND ACETAMINOPHEN 10; 325 MG/1; MG/1
1 TABLET ORAL EVERY 8 HOURS PRN
Qty: 90 TABLET | Refills: 0 | Status: SHIPPED | OUTPATIENT
Start: 2023-06-21 | End: 2023-07-21

## 2023-05-22 RX ORDER — OXYCODONE AND ACETAMINOPHEN 10; 325 MG/1; MG/1
1 TABLET ORAL EVERY 8 HOURS PRN
Qty: 90 TABLET | Refills: 0 | Status: SHIPPED | OUTPATIENT
Start: 2023-05-22 | End: 2023-06-21

## 2023-05-22 RX ORDER — GABAPENTIN 400 MG/1
400 CAPSULE ORAL EVERY 12 HOURS
Qty: 60 CAPSULE | Refills: 1 | Status: SHIPPED | OUTPATIENT
Start: 2023-05-22 | End: 2023-09-14 | Stop reason: SDUPTHER

## 2023-07-12 ENCOUNTER — HOSPITAL ENCOUNTER (OUTPATIENT)
Dept: RADIOLOGY | Facility: HOSPITAL | Age: 58
Discharge: HOME | End: 2023-07-12
Payer: MEDICARE

## 2023-07-12 DIAGNOSIS — Z12.31 VISIT FOR SCREENING MAMMOGRAM: ICD-10-CM

## 2023-07-12 PROCEDURE — 77067 MAMMO DIGITAL SCREENING BILAT: ICD-10-PCS | Mod: 26,,, | Performed by: RADIOLOGY

## 2023-07-12 PROCEDURE — 77067 SCR MAMMO BI INCL CAD: CPT | Mod: 26,,, | Performed by: RADIOLOGY

## 2023-07-12 PROCEDURE — 77067 SCR MAMMO BI INCL CAD: CPT | Mod: TC

## 2023-07-19 ENCOUNTER — OFFICE VISIT (OUTPATIENT)
Dept: PAIN MEDICINE | Facility: CLINIC | Age: 58
End: 2023-07-19
Payer: MEDICARE

## 2023-07-19 VITALS
BODY MASS INDEX: 32.49 KG/M2 | SYSTOLIC BLOOD PRESSURE: 158 MMHG | DIASTOLIC BLOOD PRESSURE: 87 MMHG | HEART RATE: 92 BPM | WEIGHT: 207 LBS | HEIGHT: 67 IN

## 2023-07-19 DIAGNOSIS — M54.17 LUMBOSACRAL RADICULOPATHY: Chronic | ICD-10-CM

## 2023-07-19 DIAGNOSIS — G89.29 CHRONIC PAIN OF RIGHT KNEE: Chronic | ICD-10-CM

## 2023-07-19 DIAGNOSIS — M25.561 CHRONIC PAIN OF RIGHT KNEE: Chronic | ICD-10-CM

## 2023-07-19 DIAGNOSIS — M89.49 OSTEOARTHROSIS MULTIPLE SITES, NOT SPECIFIED AS GENERALIZED: Primary | Chronic | ICD-10-CM

## 2023-07-19 DIAGNOSIS — M79.10 MYALGIA: Chronic | ICD-10-CM

## 2023-07-19 PROCEDURE — 4010F ACE/ARB THERAPY RXD/TAKEN: CPT | Mod: CPTII,,, | Performed by: PAIN MEDICINE

## 2023-07-19 PROCEDURE — 3008F BODY MASS INDEX DOCD: CPT | Mod: CPTII,,, | Performed by: PAIN MEDICINE

## 2023-07-19 PROCEDURE — 3079F PR MOST RECENT DIASTOLIC BLOOD PRESSURE 80-89 MM HG: ICD-10-PCS | Mod: CPTII,,, | Performed by: PAIN MEDICINE

## 2023-07-19 PROCEDURE — 3077F SYST BP >= 140 MM HG: CPT | Mod: CPTII,,, | Performed by: PAIN MEDICINE

## 2023-07-19 PROCEDURE — 3077F PR MOST RECENT SYSTOLIC BLOOD PRESSURE >= 140 MM HG: ICD-10-PCS | Mod: CPTII,,, | Performed by: PAIN MEDICINE

## 2023-07-19 PROCEDURE — 99215 OFFICE O/P EST HI 40 MIN: CPT | Mod: PBBFAC | Performed by: PAIN MEDICINE

## 2023-07-19 PROCEDURE — 1159F PR MEDICATION LIST DOCUMENTED IN MEDICAL RECORD: ICD-10-PCS | Mod: CPTII,,, | Performed by: PAIN MEDICINE

## 2023-07-19 PROCEDURE — 3079F DIAST BP 80-89 MM HG: CPT | Mod: CPTII,,, | Performed by: PAIN MEDICINE

## 2023-07-19 PROCEDURE — 4010F PR ACE/ARB THEARPY RXD/TAKEN: ICD-10-PCS | Mod: CPTII,,, | Performed by: PAIN MEDICINE

## 2023-07-19 PROCEDURE — 3008F PR BODY MASS INDEX (BMI) DOCUMENTED: ICD-10-PCS | Mod: CPTII,,, | Performed by: PAIN MEDICINE

## 2023-07-19 PROCEDURE — 99214 OFFICE O/P EST MOD 30 MIN: CPT | Mod: S$PBB,,, | Performed by: PAIN MEDICINE

## 2023-07-19 PROCEDURE — 1159F MED LIST DOCD IN RCRD: CPT | Mod: CPTII,,, | Performed by: PAIN MEDICINE

## 2023-07-19 PROCEDURE — 99214 PR OFFICE/OUTPT VISIT, EST, LEVL IV, 30-39 MIN: ICD-10-PCS | Mod: S$PBB,,, | Performed by: PAIN MEDICINE

## 2023-07-19 RX ORDER — OXYCODONE AND ACETAMINOPHEN 10; 325 MG/1; MG/1
1 TABLET ORAL EVERY 8 HOURS PRN
Qty: 90 TABLET | Refills: 0 | Status: SHIPPED | OUTPATIENT
Start: 2023-07-21 | End: 2023-08-20

## 2023-07-19 RX ORDER — OXYCODONE AND ACETAMINOPHEN 10; 325 MG/1; MG/1
1 TABLET ORAL EVERY 8 HOURS PRN
Qty: 90 TABLET | Refills: 0 | Status: SHIPPED | OUTPATIENT
Start: 2023-08-19 | End: 2023-09-14 | Stop reason: SDUPTHER

## 2023-07-19 RX ORDER — GABAPENTIN 400 MG/1
400 CAPSULE ORAL 3 TIMES DAILY
Qty: 90 CAPSULE | Refills: 1 | Status: SHIPPED | OUTPATIENT
Start: 2023-07-19 | End: 2023-09-17

## 2023-07-19 NOTE — PROGRESS NOTES
She Disclaimer: This note has been generated using voice-recognition software. There may be typographical errors that have been missed during proof-reading        Patient ID: Audrey Causey is a 58 y.o. female.      Chief Complaint: Back Pain      58-year-old female returns for re-evaluation of chronic lower back pain.  She was evaluated by Dr. Jordan and prescribed Mobic.  She received a transforaminal epidural steroid injection March 2022 and experienced greater than 80% pain relief but unfortunately was unable to afford the co-pay for repeat procedures.  She has not received a surgical evaluation and physical therapy failed to provide relief.  She continues to experience paresthesia and pain radiating from the lower back to the bilateral lower extremities and feet.  Neurontin and Percocet are providing some relief and  she returns today for medication refill.  She defers a surgical consultation          Pain Assessment  Pain Score:   8      A's of Opioid Risk Assessment  Activity:Patient can  perform ADL.   Analgesia:Patients pain is  controlled by current medication.   Adverse Effects: Patient denies constipation or sedation.  Aberrant Behavior:  reviewed with no aberrant drug seeking/taking behavior.      Patient denies any suicidal or homicidal ideations    Physical Therapy/Home Exercise: yes      Mammo Digital Screening Bilat  Narrative: Result:  Mammo Digital Screening Bilat    History:  Patient is 57 y.o. and is seen for a screening mammogram.  Positive family history of breast cancer - mother , diagnosed in her 70's     Films Compared: 05/20/2016 through 05/24/2022     Findings:  The breasts are heterogeneously dense, which may obscure small masses.   There is asymmetry with prominent fibrous tissues present in the 12   o'clock position of the left breast. No suspicious masses or   microcalcifications are seen. No interval changes have occurred.     A breast examination was performed, and no  clinically suspicious palpable   masses were present.   Impression:    No radiographic evidence of malignancy. Routine screening mammograms are   recommended.    BI-RADS Category 1: Negative    Recommendation:  Routine screening mammogram in 1 year is recommended.    Your estimated lifetime risk of breast cancer (to age 85) based on   Tyrer-Cuzick risk assessment model is 12.02 %.  According to the American   Cancer Society, patients with a lifetime breast cancer risk of 20% or   higher might benefit from supplemental screening tests. ??       Review of Systems   Constitutional: Negative.    HENT: Negative.     Eyes: Negative.    Respiratory: Negative.     Cardiovascular: Negative.    Gastrointestinal: Negative.    Endocrine: Negative.    Genitourinary: Negative.    Musculoskeletal:  Positive for arthralgias and back pain.   Integumentary:  Negative.   Neurological:  Positive for numbness (Bilateral lower extremities).   Hematological: Negative.    Psychiatric/Behavioral: Negative.             Past Medical History:   Diagnosis Date    Acute superficial gastritis without hemorrhage 2021    Arthritis     Diabetes mellitus     Diverticula, colon 2021    Esophageal dysphagia 2021    GERD (gastroesophageal reflux disease)     Hypertension     Low back pain     Lumbar radiculopathy     Seizures     Sleep apnea     Unspecified glaucoma      Past Surgical History:   Procedure Laterality Date     SECTION      COLONOSCOPY  2018    Dr. Naranjo    EPIDURAL STEROID INJECTION  2018    L4-5 MARCOS X 5 - Cook    EPIDURAL STEROID INJECTION  2017    L5-S1 MARCOS - Cook    EPIDURAL STEROID INJECTION N/A 2022    Procedure: Injection, Steroid, Epidural, L4/5;  Surgeon: Yari Soto MD;  Location: [facility];  Service: Pain Management;  Laterality: N/A;  MESSAGE LEFT ON VM TO BE TESTED ON -14    ESOPHAGOGASTRODUODENOSCOPY  2018    EYE SURGERY      GALLBLADDER SURGERY       HYSTERECTOMY      INJECTION OF FACET JOINT Bilateral 01/16/2017    Bilateral L3-S1 Facet Injection - Cook    KELOID EXCISION Bilateral 1/17/2023    Procedure: EXCISION, KELOID ON BILATERAL EARLOBES;  Surgeon: Moreno Xiong MD;  Location: [facility] OR;  Service: ENT;  Laterality: Bilateral;    LAPAROSCOPIC CHOLECYSTECTOMY N/A 07/22/2021    Procedure: CHOLECYSTECTOMY, LAPAROSCOPIC;  Surgeon: Amy Wagner MD;  Location: [facility] OR;  Service: General;  Laterality: N/A;  fully vaccinated    OOPHORECTOMY      TRANSFORAMINAL EPIDURAL INJECTION OF STEROID Bilateral 03/22/2022    Procedure: Injection,steroid,epidural,transforaminal approach, L4/5;  Surgeon: Yari Soto MD;  Location: [facility];  Service: Pain Management;  Laterality: Bilateral;  pt aware on ov to be tested     Social History     Socioeconomic History    Marital status:    Tobacco Use    Smoking status: Never    Smokeless tobacco: Never   Substance and Sexual Activity    Alcohol use: Never    Drug use: Yes     Types: Oxycodone     Family History   Problem Relation Age of Onset    Hypertension Mother     Breast cancer Mother     Thyroid cancer Mother     Diabetes type II Mother     Stroke Mother     Heart attack Mother      Review of patient's allergies indicates:  No Known Allergies  has a current medication list which includes the following prescription(s): amlodipine, blood pressure monitor, carvedilol, cholecalciferol (vitamin d3), cyanocobalamin, dorzolamide-timolol 2-0.5%, gabapentin, hydralazine, lisinopril-hydrochlorothiazide, meloxicam, metformin, rhopressa, omeprazole, oxycodone-acetaminophen, potassium chloride sa, rhopressa, tafluprost (pf), travoprost, aspirin, buspirone, citalopram, estradiol, gabapentin, metoclopramide hcl, metronidazole, [START ON 7/21/2023] oxycodone-acetaminophen, [START ON 8/19/2023] oxycodone-acetaminophen, and quetiapine.      Objective:  Vitals:    07/19/23 0814   BP: (!) 158/87   Pulse:  92        Physical Exam  Vitals and nursing note (Accompanied by her ) reviewed.   Constitutional:       General: She is not in acute distress.     Appearance: Normal appearance. She is not ill-appearing, toxic-appearing or diaphoretic.   HENT:      Head: Normocephalic and atraumatic.      Nose: Nose normal.      Mouth/Throat:      Mouth: Mucous membranes are moist.   Eyes:      Extraocular Movements: Extraocular movements intact.      Pupils: Pupils are equal, round, and reactive to light.   Cardiovascular:      Rate and Rhythm: Normal rate and regular rhythm.      Heart sounds: Normal heart sounds.   Pulmonary:      Effort: Pulmonary effort is normal. No respiratory distress.      Breath sounds: Normal breath sounds. No stridor. No wheezing or rhonchi.   Abdominal:      General: Bowel sounds are normal.      Palpations: Abdomen is soft.   Musculoskeletal:         General: No swelling or deformity.      Cervical back: Normal and normal range of motion. No spasms or tenderness. No pain with movement. Normal range of motion.      Thoracic back: Normal.      Lumbar back: Tenderness present. No spasms or bony tenderness. Decreased range of motion. Negative right straight leg raise test and negative left straight leg raise test. No scoliosis.      Right lower leg: No edema.      Left lower leg: No edema.   Skin:     General: Skin is warm.   Neurological:      General: No focal deficit present.      Mental Status: She is alert and oriented to person, place, and time. Mental status is at baseline.      Cranial Nerves: No cranial nerve deficit.      Sensory: Sensation is intact. No sensory deficit.      Motor: No weakness.      Coordination: Coordination normal.      Gait: Gait normal.      Deep Tendon Reflexes: Reflexes are normal and symmetric.   Psychiatric:         Mood and Affect: Mood normal.         Behavior: Behavior normal.         Assessment:      1. Osteoarthrosis multiple sites, not specified as  generalized    2. Myalgia    3. Chronic pain of right knee    4. Lumbosacral radiculopathy          Plan:  1. reviewed  2.Addiction, Dependency, Tolerance, Opioid abuse-misuse, Death, Diversion Discussed. Overdose reversal drug Naloxone discussed.  3.Refill/Continue medications for pain control and function       Requested Prescriptions     Signed Prescriptions Disp Refills    oxyCODONE-acetaminophen (PERCOCET)  mg per tablet 90 tablet 0     Sig: Take 1 tablet by mouth every 8 (eight) hours as needed for Pain.    oxyCODONE-acetaminophen (PERCOCET)  mg per tablet 90 tablet 0     Sig: Take 1 tablet by mouth every 8 (eight) hours as needed for Pain.    gabapentin (NEURONTIN) 400 MG capsule 90 capsule 1     Sig: Take 1 capsule (400 mg total) by mouth 3 (three) times daily.     4.Patient defers nerve block injections, physical therapy or surgical consultation     5.Follow with CORINA Rodriguez in 2 months for re-evaluation and medication refill       report:  Reviewed and consistent with medication use as prescribed.      The total time spent for evaluation and management on 07/19/2023 including reviewing separately obtained history, performing a medically appropriate exam and evaluation, documenting clinical information in the health record, independently interpreting results and communicating them to the patient/family/caregiver, and ordering medications/tests/procedures was between 15-29 minutes.    The above plan and management options were discussed at length with patient. Patient is in agreement with the above and verbalized understanding. It will be communicated with the referring physician via electronic record, fax, or mail.

## 2023-08-07 ENCOUNTER — OFFICE VISIT (OUTPATIENT)
Dept: RHEUMATOLOGY | Facility: CLINIC | Age: 58
End: 2023-08-07
Payer: MEDICARE

## 2023-08-07 VITALS — DIASTOLIC BLOOD PRESSURE: 80 MMHG | SYSTOLIC BLOOD PRESSURE: 140 MMHG

## 2023-08-07 DIAGNOSIS — M19.072 ARTHROSIS OF LEFT MIDFOOT: Primary | ICD-10-CM

## 2023-08-07 PROCEDURE — 99215 PR OFFICE/OUTPT VISIT, EST, LEVL V, 40-54 MIN: ICD-10-PCS | Mod: S$PBB,,, | Performed by: INTERNAL MEDICINE

## 2023-08-07 PROCEDURE — 3077F PR MOST RECENT SYSTOLIC BLOOD PRESSURE >= 140 MM HG: ICD-10-PCS | Mod: CPTII,,, | Performed by: INTERNAL MEDICINE

## 2023-08-07 PROCEDURE — 1159F MED LIST DOCD IN RCRD: CPT | Mod: CPTII,,, | Performed by: INTERNAL MEDICINE

## 2023-08-07 PROCEDURE — 1159F PR MEDICATION LIST DOCUMENTED IN MEDICAL RECORD: ICD-10-PCS | Mod: CPTII,,, | Performed by: INTERNAL MEDICINE

## 2023-08-07 PROCEDURE — 99215 OFFICE O/P EST HI 40 MIN: CPT | Mod: S$PBB,,, | Performed by: INTERNAL MEDICINE

## 2023-08-07 PROCEDURE — 99215 OFFICE O/P EST HI 40 MIN: CPT | Mod: PBBFAC | Performed by: INTERNAL MEDICINE

## 2023-08-07 PROCEDURE — 4010F ACE/ARB THERAPY RXD/TAKEN: CPT | Mod: CPTII,,, | Performed by: INTERNAL MEDICINE

## 2023-08-07 PROCEDURE — 3077F SYST BP >= 140 MM HG: CPT | Mod: CPTII,,, | Performed by: INTERNAL MEDICINE

## 2023-08-07 PROCEDURE — 4010F PR ACE/ARB THEARPY RXD/TAKEN: ICD-10-PCS | Mod: CPTII,,, | Performed by: INTERNAL MEDICINE

## 2023-08-07 PROCEDURE — 3079F PR MOST RECENT DIASTOLIC BLOOD PRESSURE 80-89 MM HG: ICD-10-PCS | Mod: CPTII,,, | Performed by: INTERNAL MEDICINE

## 2023-08-07 PROCEDURE — 3079F DIAST BP 80-89 MM HG: CPT | Mod: CPTII,,, | Performed by: INTERNAL MEDICINE

## 2023-08-07 NOTE — PROGRESS NOTES
Mary Jordan MD   [facility]  [address] [facility] [place] [address]  [phone]      PATIENT NAME: Audrey Causey  : 1965  DATE: 23  MRN: [id number]      Billing Provider: Mary Jordan MD  Level of Service:   Patient PCP Information       Provider PCP Type    Carissa Barker MD General            Reason for Visit / Chief Complaint: Follow-up (Follow up foot, leg, and wrist pain/Pt states the mobic is not helping)           Assessment and Plan (including Health Maintenance)      Problem List  Smart Sets  Document Outside HM   :    Plan:     Primarily working up for left mid-foot localized swelling which may reflect local arthrosis vs CPPD/gout.   Back pain management per Pain clinic.   Will start on mobic 15 for 30 days.   X-ray of left foot and ankle.      2024:  X-rays are normal except for left soft tissue swelling around medial malleolus.   No response to mobic whatsoever.   Patient may d/c mobic now.   Referral for foot and ankle PT placed.       ICD-10-CM ICD-9-CM    1. Arthrosis of left midfoot  M19.072 715.97 Ambulatory referral/consult to Physical/Occupational Therapy           Total time spent in Assessment, Co-ordination of Care , Review of Care Plan , Goal Setting and Counseling on this initial visit is ~ 45 minutes     There is currently no information documented on the homunculus. Go to the Rheumatology activity and complete the homunculus joint exam.               History of Present Illness / Problem Focused Workflow     Audrey Causey presents to the clinic with Follow-up (Follow up foot, leg, and wrist pain/Pt states the mobic is not helping)     Patient states that she has 'pain all over' including knees and ankles. Has h/o carpal tunnel syndrome b/l   No morning stiffness in hands.   Primary concern today is with left mid-foot region. She has swelling along the dorsum of the left foot.     Reviewed MRI of lumbar  spine;  L5-S1: Mild-to-moderate loss of disc space height.  Minimal diffuse disc bulging.  Mild posterior facet arthropathy.  Minimal spinal canal narrowing.  Mild bilateral neural foraminal narrowing.  Impression: Mild degenerative change of the lumbar spine with mild spinal canal and neuroforaminal narrowing as detailed above.        Review of Systems     Review of Systems    Medical / Social / Family History     Past Medical History:   Diagnosis Date    Acute superficial gastritis without hemorrhage 2021    Arthritis     Diabetes mellitus     Diverticula, colon 2021    Esophageal dysphagia 2021    GERD (gastroesophageal reflux disease)     Hypertension     Low back pain     Lumbar radiculopathy     Seizures     Sleep apnea     Unspecified glaucoma        Past Surgical History:   Procedure Laterality Date     SECTION      COLONOSCOPY  2018    Dr. Naranjo    EPIDURAL STEROID INJECTION  2018    L4-5 MARCOS X 5 - Cook    EPIDURAL STEROID INJECTION  2017    L5-S1 MARCOS - Cook    EPIDURAL STEROID INJECTION N/A 2022    Procedure: Injection, Steroid, Epidural, L4/5;  Surgeon: Yari Soto MD;  Location: [facility];  Service: Pain Management;  Laterality: N/A;  MESSAGE LEFT ON VM TO BE TESTED ON 1-14    ESOPHAGOGASTRODUODENOSCOPY  2018    EYE SURGERY      GALLBLADDER SURGERY      HYSTERECTOMY      INJECTION OF FACET JOINT Bilateral 2017    Bilateral L3-S1 Facet Injection - Cook    KELOID EXCISION Bilateral 2023    Procedure: EXCISION, KELOID ON BILATERAL EARLOBES;  Surgeon: Moreno Xiong MD;  Location: [facility] OR;  Service: ENT;  Laterality: Bilateral;    LAPAROSCOPIC CHOLECYSTECTOMY N/A 2021    Procedure: CHOLECYSTECTOMY, LAPAROSCOPIC;  Surgeon: Amy Wagner MD;  Location: [facility] OR;  Service: General;  Laterality: N/A;  fully vaccinated    OOPHORECTOMY      TRANSFORAMINAL EPIDURAL INJECTION OF STEROID Bilateral 2022     Procedure: Injection,steroid,epidural,transforaminal approach, L4/5;  Surgeon: Yari Soto MD;  Location: [facility];  Service: Pain Management;  Laterality: Bilateral;  pt aware on ov to be tested       Social History  Ms. Audrey Causey  reports that she has never smoked. She has never been exposed to tobacco smoke. She has never used smokeless tobacco. She reports current drug use. Drug: Oxycodone. She reports that she does not drink alcohol.    Family History  Ms.'benjy Causey family history includes Breast cancer in her mother; Diabetes type II in her mother; Heart attack in her mother; Hypertension in her mother; Stroke in her mother; Thyroid cancer in her mother.    Medications and Allergies     Medications  Outpatient Medications Marked as Taking for the 8/7/23 encounter (Office Visit) with Mary Jordan MD   Medication Sig Dispense Refill    amLODIPine (NORVASC) 10 MG tablet Take 1 tablet by mouth once daily.      aspirin (ECOTRIN) 81 MG EC tablet Take 81 mg by mouth once daily.      blood pressure monitor Kit Use to monitor blood pressure as directed      carvediloL (COREG) 12.5 MG tablet Take 1 tablet by mouth once daily.      cholecalciferol, vitamin D3, 125 mcg (5,000 unit) Tab       dorzolamide-timolol 2-0.5% (COSOPT) 22.3-6.8 mg/mL ophthalmic solution Place 1 drop into both eyes 2 (two) times a day.      gabapentin (NEURONTIN) 400 MG capsule Take 1 capsule (400 mg total) by mouth every 12 (twelve) hours. 60 capsule 1    hydrALAZINE (APRESOLINE) 100 MG tablet Take 1 tablet by mouth 3 (three) times daily.      lisinopriL-hydrochlorothiazide (PRINZIDE,ZESTORETIC) 20-25 mg Tab Take 1 tablet by mouth once daily.      meloxicam (MOBIC) 15 MG tablet Take 1 tablet (15 mg total) by mouth once daily. 45 tablet 1    metFORMIN (GLUCOPHAGE) 1000 MG tablet Take 1 tablet by mouth 2 (two) times a day.      netarsudiL (RHOPRESSA) 0.02 % ophthalmic solution Apply to eye.      oxyCODONE-acetaminophen  (PERCOCET)  mg per tablet Take 1 tablet by mouth every 8 (eight) hours as needed for Pain. 90 tablet 0    [START ON 8/19/2023] oxyCODONE-acetaminophen (PERCOCET)  mg per tablet Take 1 tablet by mouth every 8 (eight) hours as needed for Pain. 90 tablet 0    potassium chloride SA (K-DUR,KLOR-CON) 20 MEQ tablet Take 1 tablet by mouth 2 (two) times a day.      RHOPRESSA 0.02 % ophthalmic solution Place 1 drop into both eyes once daily.      tafluprost, PF, (ZIOPTAN, PF,) 0.0015 % Dpet AS DIRECTED      travoprost (TRAVATAN Z) 0.004 % ophthalmic solution Place 1 drop into both eyes once daily.         Allergies  Review of patient's allergies indicates:  No Known Allergies    Physical Examination     Vitals:    08/07/23 1607   BP: (!) 140/80     Physical Exam  Musculoskeletal:         General: Swelling and tenderness present.      Comments: Left midfoot pain              Signature:  Mary Jordan MD  [facility]  [address] [facility] [address]  [phone]    Date of encounter: 8/7/23

## 2023-09-14 NOTE — PROGRESS NOTES
Subjective:         Patient ID: Audrey Causey is a 58 y.o. female.    Chief Complaint: Low-back Pain        Pain  This is a chronic problem. The current episode started more than 1 year ago. The problem occurs daily. The problem has been waxing and waning. Associated symptoms include arthralgias and neck pain. Pertinent negatives include no anorexia, chest pain, chills, coughing, diaphoresis, fever, sore throat, vertigo or vomiting.     Review of Systems   Constitutional:  Negative for activity change, appetite change, chills, diaphoresis, fever and unexpected weight change.   HENT:  Negative for drooling, ear discharge, ear pain, facial swelling, nosebleeds, sore throat, trouble swallowing, voice change and goiter.    Eyes:  Negative for photophobia, pain, discharge, redness and visual disturbance.   Respiratory:  Negative for apnea, cough, choking, chest tightness, shortness of breath, wheezing and stridor.    Cardiovascular:  Negative for chest pain, palpitations and leg swelling.   Gastrointestinal:  Negative for abdominal distention, anorexia, diarrhea, rectal pain, vomiting and fecal incontinence.   Endocrine: Negative for cold intolerance, heat intolerance, polydipsia, polyphagia and polyuria.   Genitourinary:  Negative for bladder incontinence, dysuria, flank pain, frequency and hot flashes.   Musculoskeletal:  Positive for arthralgias, back pain, leg pain, neck pain and neck stiffness.   Integumentary:  Negative for color change and pallor.   Allergic/Immunologic: Negative for immunocompromised state.   Neurological:  Negative for dizziness, vertigo, seizures, syncope, facial asymmetry, speech difficulty, light-headedness, memory loss and coordination difficulties.   Hematological:  Negative for adenopathy. Does not bruise/bleed easily.   Psychiatric/Behavioral:  Negative for agitation, behavioral problems, confusion, decreased concentration, dysphoric mood, hallucinations, self-injury and suicidal ideas.  The patient is not nervous/anxious and is not hyperactive.            Past Medical History:   Diagnosis Date    Acute superficial gastritis without hemorrhage 2021    Arthritis     Diabetes mellitus     Diverticula, colon 2021    Esophageal dysphagia 2021    GERD (gastroesophageal reflux disease)     Hypertension     Low back pain     Lumbar radiculopathy     Seizures     Sleep apnea     Unspecified glaucoma      Past Surgical History:   Procedure Laterality Date     SECTION      COLONOSCOPY  2018    Dr. Naranjo    EPIDURAL STEROID INJECTION  2018    L4-5 MARCOS X 5 - Cook    EPIDURAL STEROID INJECTION  2017    L5-S1 MARCOS - Cook    EPIDURAL STEROID INJECTION N/A 2022    Procedure: Injection, Steroid, Epidural, L4/5;  Surgeon: Yari Soto MD;  Location: [facility];  Service: Pain Management;  Laterality: N/A;  MESSAGE LEFT ON VM TO BE TESTED ON 1-14    ESOPHAGOGASTRODUODENOSCOPY  2018    EYE SURGERY      EYE SURGERY Left     shank    GALLBLADDER SURGERY      HYSTERECTOMY      INJECTION OF FACET JOINT Bilateral 2017    Bilateral L3-S1 Facet Injection - Cook    KELOID EXCISION Bilateral 2023    Procedure: EXCISION, KELOID ON BILATERAL EARLOBES;  Surgeon: Moreno Xiong MD;  Location: [facility];  Service: ENT;  Laterality: Bilateral;    LAPAROSCOPIC CHOLECYSTECTOMY N/A 2021    Procedure: CHOLECYSTECTOMY, LAPAROSCOPIC;  Surgeon: Amy Wagner MD;  Location: [facility] OR;  Service: General;  Laterality: N/A;  fully vaccinated    OOPHORECTOMY      TRANSFORAMINAL EPIDURAL INJECTION OF STEROID Bilateral 2022    Procedure: Injection,steroid,epidural,transforaminal approach, L4/5;  Surgeon: Yari Soto MD;  Location: [facility];  Service: Pain Management;  Laterality: Bilateral;  pt aware on ov to be tested     Social History     Socioeconomic History    Marital status:    Tobacco  "Use    Smoking status: Never     Passive exposure: Never    Smokeless tobacco: Never   Substance and Sexual Activity    Alcohol use: Never    Drug use: Yes     Types: Oxycodone     Family History   Problem Relation Age of Onset    Hypertension Mother     Breast cancer Mother     Thyroid cancer Mother     Diabetes type II Mother     Stroke Mother     Heart attack Mother      Review of patient's allergies indicates:  No Known Allergies     Objective:  Vitals:    09/19/23 0856   BP: (!) 149/75   Pulse: 77   Resp: 18   Weight: 91.6 kg (202 lb)   Height: 5' 7" (1.702 m)   PainSc:   8           Physical Exam  Vitals and nursing note reviewed. Exam conducted with a chaperone present.   Constitutional:       General: She is awake.      Appearance: Normal appearance. She is not ill-appearing, toxic-appearing or diaphoretic.   HENT:      Head: Normocephalic and atraumatic.      Nose: Nose normal.      Mouth/Throat:      Mouth: Mucous membranes are moist.      Pharynx: Oropharynx is clear.   Eyes:      Conjunctiva/sclera: Conjunctivae normal.      Pupils: Pupils are equal, round, and reactive to light.   Cardiovascular:      Rate and Rhythm: Normal rate.   Pulmonary:      Effort: Pulmonary effort is normal. No respiratory distress.   Abdominal:      Palpations: Abdomen is soft.      Tenderness: There is no guarding.   Musculoskeletal:         General: Normal range of motion.      Cervical back: Normal range of motion and neck supple. Tenderness present. No rigidity.      Thoracic back: Tenderness present.      Lumbar back: Tenderness present.      Right knee: Tenderness present.      Left knee: Tenderness present.   Skin:     General: Skin is warm and dry.      Coloration: Skin is not jaundiced or pale.   Neurological:      General: No focal deficit present.      Mental Status: She is alert and oriented to person, place, and time. Mental status is at baseline.      Cranial Nerves: No cranial nerve deficit (II-XII). "   Psychiatric:         Mood and Affect: Mood normal.         Behavior: Behavior normal. Behavior is cooperative.         Thought Content: Thought content normal.         Mammo Digital Screening Bilat  Narrative: Result:  Mammo Digital Screening Bilat    History:  Patient is 57 y.o. and is seen for a screening mammogram.  Positive family history of breast cancer - mother , diagnosed in her 70's     Films Compared: 05/20/2016 through 05/24/2022     Findings:  The breasts are heterogeneously dense, which may obscure small masses.   There is asymmetry with prominent fibrous tissues present in the 12   o'clock position of the left breast. No suspicious masses or   microcalcifications are seen. No interval changes have occurred.     A breast examination was performed, and no clinically suspicious palpable   masses were present.   Impression:    No radiographic evidence of malignancy. Routine screening mammograms are   recommended.    BI-RADS Category 1: Negative    Recommendation:  Routine screening mammogram in 1 year is recommended.    Your estimated lifetime risk of breast cancer (to age 85) based on   Tyrer-Cuzick risk assessment model is 12.02 %.  According to the American   Cancer Society, patients with a lifetime breast cancer risk of 20% or   higher might benefit from supplemental screening tests. ??          Lab Visit on 03/22/2023   Component Date Value Ref Range Status    Hepatitis C Ab 03/22/2023 Non-Reactive  Non-Reactive Final    Hepatitis B Surface Ag 03/22/2023 Non-Reactive  Non-Reactive Final    Hepatitis B Surface Ab 03/22/2023 Non-Reactive  Non-Reactive Final    Nil Result, TB 03/22/2023 0.02   Final    TB1 Ag minus Nil Result 03/22/2023 0.00   Final    TB2 Ag minus Nil Result 03/22/2023 0.00   Final    Mitogen minus Nil Result, TB 03/22/2023 9.30   Final    QuantiFERON-Tb Gold Plus 03/22/2023 Negative  Negative Final    CCP 03/22/2023 <16.0  <=19.9 units Final    RA Titer 03/22/2023 <10  0 -  15 IU/mL Final    CRP 03/22/2023 2.80 (H)  0.00 - 0.80 mg/dL Final    ESR Westergren 03/22/2023 29  0 - 30 mm/Hr Final    Uric Acid 03/22/2023 4.7  2.6 - 6.0 mg/dL Final   Office Visit on 03/22/2023   Component Date Value Ref Range Status    POC Amphetamines 03/22/2023 Negative  Negative, Inconclusive Final-Edited    POC Barbiturates 03/22/2023 Negative  Negative, Inconclusive Final-Edited    POC Benzodiazepines 03/22/2023 Negative  Negative, Inconclusive Final-Edited    POC Cocaine 03/22/2023 Negative  Negative, Inconclusive Final-Edited    POC THC 03/22/2023 Negative  Negative, Inconclusive Final-Edited    POC Methadone 03/22/2023 Negative  Negative, Inconclusive Final-Edited    POC Methamphetamine 03/22/2023 Negative  Negative, Inconclusive Final-Edited    POC Opiates 03/22/2023 Negative  Negative, Inconclusive Final-Edited    POC Oxycodone 03/22/2023 Presumptive Positive (A)  Negative, Inconclusive Final-Edited    POC Phencyclidine 03/22/2023 Negative  Negative, Inconclusive Final-Edited    POC Methylenedioxymethamphetamine * 03/22/2023 Negative  Negative, Inconclusive Final-Edited    POC Tricyclic Antidepressants 03/22/2023 Negative  Negative, Inconclusive Final-Edited    POC Buprenorphine 03/22/2023 Negative   Final-Edited     Acceptable 03/22/2023 Yes   Final-Edited    POC Temperature (Urine) 03/22/2023 92   Final-Edited         No orders of the defined types were placed in this encounter.        Requested Prescriptions     Pending Prescriptions Disp Refills    oxyCODONE-acetaminophen (PERCOCET)  mg per tablet 90 tablet 0     Sig: Take 1 tablet by mouth every 8 (eight) hours as needed for Pain.    gabapentin (NEURONTIN) 400 MG capsule 60 capsule 1     Sig: Take 1 capsule (400 mg total) by mouth every 12 (twelve) hours.       Assessment:     1. Lumbar radiculopathy, chronic    2. Chronic pain of right knee    3. Osteoarthrosis multiple sites, not specified as  generalized    4. Chronic inflammatory arthritis         A's of Opioid Risk Assessment  Activity:Patient can perform ADL.   Analgesia:Patients pain is partially controlled by current medication. Patient has tried OTC medications such as Tylenol and Ibuprofen with out relief.   Adverse Effects: Patient denies constipation or sedation.  Aberrant Behavior:  reviewed with no aberrant drug seeking/taking behavior.  Overdose reversal drug naloxone discussed    Drug screen reviewed      Plan:    Narcan January 2023    Follows rheumatology [facility] inflammatory arthritis    Complaint back pain buttock and leg pain numbness and tingling radicular in nature left greater than right     Reviewed MRI lumbar spine [facility] 2022 multiple level degenerative changes neuroforaminal narrowing L4/5 L5/S1 left greater than right     She had bilateral lumbar L4/5 TFESI # 2 March 22, 2022  She states she had 80% relief after procedure she states that procedure did help increase her level function she has had more than 3 months pain relief following her procedure    Could not afford co-pay for any further procedures    She would like to continue with current medication is helping with her discomfort    Continue current medication    Continue home exercise program as directed    Follow-up 2 months    Dr. Soto, July 2024    Bring original prescription medication bottles/container/box with labels to each visit

## 2023-09-19 ENCOUNTER — OFFICE VISIT (OUTPATIENT)
Dept: PAIN MEDICINE | Facility: CLINIC | Age: 58
End: 2023-09-19
Payer: MEDICARE

## 2023-09-19 VITALS
BODY MASS INDEX: 31.71 KG/M2 | WEIGHT: 202 LBS | HEART RATE: 77 BPM | RESPIRATION RATE: 18 BRPM | HEIGHT: 67 IN | DIASTOLIC BLOOD PRESSURE: 75 MMHG | SYSTOLIC BLOOD PRESSURE: 149 MMHG

## 2023-09-19 DIAGNOSIS — M54.16 LUMBAR RADICULOPATHY, CHRONIC: Primary | Chronic | ICD-10-CM

## 2023-09-19 DIAGNOSIS — M19.90 CHRONIC INFLAMMATORY ARTHRITIS: ICD-10-CM

## 2023-09-19 DIAGNOSIS — M25.561 CHRONIC PAIN OF RIGHT KNEE: Chronic | ICD-10-CM

## 2023-09-19 DIAGNOSIS — M89.49 OSTEOARTHROSIS MULTIPLE SITES, NOT SPECIFIED AS GENERALIZED: Chronic | ICD-10-CM

## 2023-09-19 DIAGNOSIS — Z79.899 ENCOUNTER FOR LONG-TERM (CURRENT) USE OF OTHER MEDICATIONS: ICD-10-CM

## 2023-09-19 DIAGNOSIS — G89.29 CHRONIC PAIN OF RIGHT KNEE: Chronic | ICD-10-CM

## 2023-09-19 PROCEDURE — 99214 PR OFFICE/OUTPT VISIT, EST, LEVL IV, 30-39 MIN: ICD-10-PCS | Mod: S$PBB,,, | Performed by: PHYSICIAN ASSISTANT

## 2023-09-19 PROCEDURE — 3008F BODY MASS INDEX DOCD: CPT | Mod: CPTII,,, | Performed by: PHYSICIAN ASSISTANT

## 2023-09-19 PROCEDURE — 99999PBSHW POCT URINE DRUG SCREEN PRESUMP: ICD-10-PCS | Mod: PBBFAC,,,

## 2023-09-19 PROCEDURE — 3077F SYST BP >= 140 MM HG: CPT | Mod: CPTII,,, | Performed by: PHYSICIAN ASSISTANT

## 2023-09-19 PROCEDURE — 3078F PR MOST RECENT DIASTOLIC BLOOD PRESSURE < 80 MM HG: ICD-10-PCS | Mod: CPTII,,, | Performed by: PHYSICIAN ASSISTANT

## 2023-09-19 PROCEDURE — 80305 DRUG TEST PRSMV DIR OPT OBS: CPT | Mod: PBBFAC | Performed by: PHYSICIAN ASSISTANT

## 2023-09-19 PROCEDURE — 99999PBSHW POCT URINE DRUG SCREEN PRESUMP: Mod: PBBFAC,,,

## 2023-09-19 PROCEDURE — 3008F PR BODY MASS INDEX (BMI) DOCUMENTED: ICD-10-PCS | Mod: CPTII,,, | Performed by: PHYSICIAN ASSISTANT

## 2023-09-19 PROCEDURE — 99214 OFFICE O/P EST MOD 30 MIN: CPT | Mod: S$PBB,,, | Performed by: PHYSICIAN ASSISTANT

## 2023-09-19 PROCEDURE — 4010F PR ACE/ARB THEARPY RXD/TAKEN: ICD-10-PCS | Mod: CPTII,,, | Performed by: PHYSICIAN ASSISTANT

## 2023-09-19 PROCEDURE — 3077F PR MOST RECENT SYSTOLIC BLOOD PRESSURE >= 140 MM HG: ICD-10-PCS | Mod: CPTII,,, | Performed by: PHYSICIAN ASSISTANT

## 2023-09-19 PROCEDURE — 3078F DIAST BP <80 MM HG: CPT | Mod: CPTII,,, | Performed by: PHYSICIAN ASSISTANT

## 2023-09-19 PROCEDURE — 4010F ACE/ARB THERAPY RXD/TAKEN: CPT | Mod: CPTII,,, | Performed by: PHYSICIAN ASSISTANT

## 2023-09-19 PROCEDURE — 99215 OFFICE O/P EST HI 40 MIN: CPT | Mod: PBBFAC | Performed by: PHYSICIAN ASSISTANT

## 2023-09-19 PROCEDURE — 1159F PR MEDICATION LIST DOCUMENTED IN MEDICAL RECORD: ICD-10-PCS | Mod: CPTII,,, | Performed by: PHYSICIAN ASSISTANT

## 2023-09-19 PROCEDURE — 1159F MED LIST DOCD IN RCRD: CPT | Mod: CPTII,,, | Performed by: PHYSICIAN ASSISTANT

## 2023-09-19 RX ORDER — OXYCODONE AND ACETAMINOPHEN 10; 325 MG/1; MG/1
1 TABLET ORAL EVERY 8 HOURS PRN
Qty: 90 TABLET | Refills: 0 | Status: SHIPPED | OUTPATIENT
Start: 2023-10-19 | End: 2023-11-13 | Stop reason: SDUPTHER

## 2023-09-19 RX ORDER — OXYCODONE AND ACETAMINOPHEN 10; 325 MG/1; MG/1
1 TABLET ORAL EVERY 8 HOURS PRN
Qty: 90 TABLET | Refills: 0 | Status: SHIPPED | OUTPATIENT
Start: 2023-09-19 | End: 2023-11-13 | Stop reason: SDUPTHER

## 2023-09-19 RX ORDER — GABAPENTIN 400 MG/1
400 CAPSULE ORAL EVERY 12 HOURS
Qty: 60 CAPSULE | Refills: 1 | Status: SHIPPED | OUTPATIENT
Start: 2023-09-19 | End: 2024-01-16 | Stop reason: SDUPTHER

## 2023-11-13 NOTE — PROGRESS NOTES
Subjective:         Patient ID: Audrey Causey is a 58 y.o. female.    Chief Complaint: Low-back Pain, Leg Pain, and Foot Pain        Pain  This is a chronic problem. The current episode started more than 1 year ago. The problem occurs daily. The problem has been unchanged. Associated symptoms include arthralgias and neck pain. Pertinent negatives include no anorexia, chest pain, chills, coughing, diaphoresis, fever, sore throat, vertigo or vomiting.     Review of Systems   Constitutional:  Negative for activity change, appetite change, chills, diaphoresis, fever and unexpected weight change.   HENT:  Negative for drooling, ear discharge, ear pain, facial swelling, nosebleeds, sore throat, trouble swallowing, voice change and goiter.    Eyes:  Negative for photophobia, pain, discharge, redness and visual disturbance.   Respiratory:  Negative for apnea, cough, choking, chest tightness, shortness of breath, wheezing and stridor.    Cardiovascular:  Negative for chest pain, palpitations and leg swelling.   Gastrointestinal:  Negative for abdominal distention, anorexia, diarrhea, rectal pain, vomiting and fecal incontinence.   Endocrine: Negative for cold intolerance, heat intolerance, polydipsia, polyphagia and polyuria.   Genitourinary:  Negative for bladder incontinence, dysuria, flank pain, frequency and hot flashes.   Musculoskeletal:  Positive for arthralgias, back pain, leg pain, neck pain and neck stiffness.   Integumentary:  Negative for color change and pallor.   Allergic/Immunologic: Negative for immunocompromised state.   Neurological:  Negative for dizziness, vertigo, seizures, syncope, facial asymmetry, speech difficulty, light-headedness, memory loss and coordination difficulties.   Hematological:  Negative for adenopathy. Does not bruise/bleed easily.   Psychiatric/Behavioral:  Negative for agitation, behavioral problems, confusion, decreased concentration, dysphoric mood, hallucinations, self-injury and  suicidal ideas. The patient is not nervous/anxious and is not hyperactive.            Past Medical History:   Diagnosis Date    Acute superficial gastritis without hemorrhage 2021    Arthritis     Diabetes mellitus     Diverticula, colon 2021    Esophageal dysphagia 2021    GERD (gastroesophageal reflux disease)     Hypertension     Low back pain     Lumbar radiculopathy     Seizures     Sleep apnea     Unspecified glaucoma      Past Surgical History:   Procedure Laterality Date     SECTION      COLONOSCOPY  2018    Dr. Naranjo    EPIDURAL STEROID INJECTION  2018    L4-5 MARCOS X 5 - Cook    EPIDURAL STEROID INJECTION  2017    L5-S1 MARCOS - Cook    EPIDURAL STEROID INJECTION N/A 2022    Procedure: Injection, Steroid, Epidural, L4/5;  Surgeon: Yari Soto MD;  Location: [facility];  Service: Pain Management;  Laterality: N/A;  MESSAGE LEFT ON VM TO BE TESTED ON 1-14    ESOPHAGOGASTRODUODENOSCOPY  2018    EYE SURGERY      EYE SURGERY Left     shank    GALLBLADDER SURGERY      HYSTERECTOMY      INJECTION OF FACET JOINT Bilateral 2017    Bilateral L3-S1 Facet Injection - Cook    KELOID EXCISION Bilateral 2023    Procedure: EXCISION, KELOID ON BILATERAL EARLOBES;  Surgeon: Moreno Xiong MD;  Location: [facility] OR;  Service: ENT;  Laterality: Bilateral;    LAPAROSCOPIC CHOLECYSTECTOMY N/A 2021    Procedure: CHOLECYSTECTOMY, LAPAROSCOPIC;  Surgeon: Amy Wagner MD;  Location: [facility] OR;  Service: General;  Laterality: N/A;  fully vaccinated    OOPHORECTOMY      TRANSFORAMINAL EPIDURAL INJECTION OF STEROID Bilateral 2022    Procedure: Injection,steroid,epidural,transforaminal approach, L4/5;  Surgeon: Yari Soto MD;  Location: [facility];  Service: Pain Management;  Laterality: Bilateral;  pt aware on ov to be tested     Social History     Socioeconomic History    Marital status:    Tobacco Use     "Smoking status: Never     Passive exposure: Never    Smokeless tobacco: Never   Substance and Sexual Activity    Alcohol use: Never    Drug use: Yes     Types: Oxycodone     Family History   Problem Relation Age of Onset    Hypertension Mother     Breast cancer Mother     Thyroid cancer Mother     Diabetes type II Mother     Stroke Mother     Heart attack Mother      Review of patient's allergies indicates:  No Known Allergies     Objective:  Vitals:    11/16/23 0839   BP: (!) 141/72   Pulse: 73   Resp: 18   Weight: 92.1 kg (203 lb)   Height: 5' 7" (1.702 m)   PainSc:   9           Physical Exam  Vitals and nursing note reviewed. Exam conducted with a chaperone present.   Constitutional:       General: She is awake.      Appearance: Normal appearance. She is not ill-appearing, toxic-appearing or diaphoretic.   HENT:      Head: Normocephalic and atraumatic.      Nose: Nose normal.      Mouth/Throat:      Mouth: Mucous membranes are moist.      Pharynx: Oropharynx is clear.   Eyes:      Conjunctiva/sclera: Conjunctivae normal.      Pupils: Pupils are equal, round, and reactive to light.   Cardiovascular:      Rate and Rhythm: Normal rate.   Pulmonary:      Effort: Pulmonary effort is normal. No respiratory distress.   Abdominal:      Palpations: Abdomen is soft.      Tenderness: There is no guarding.   Musculoskeletal:         General: Normal range of motion.      Cervical back: Normal range of motion and neck supple. Tenderness present. No rigidity.      Thoracic back: Tenderness present.      Lumbar back: Tenderness present.      Right knee: Tenderness present.      Left knee: Tenderness present.   Skin:     General: Skin is warm and dry.      Coloration: Skin is not jaundiced or pale.   Neurological:      General: No focal deficit present.      Mental Status: She is alert and oriented to person, place, and time. Mental status is at baseline.      Cranial Nerves: No cranial nerve deficit (II-XII).   Psychiatric:    "      Mood and Affect: Mood normal.         Behavior: Behavior normal. Behavior is cooperative.         Thought Content: Thought content normal.           Mammo Digital Screening Bilat  Narrative: Result:  Mammo Digital Screening Bilat    History:  Patient is 57 y.o. and is seen for a screening mammogram.  Positive family history of breast cancer - mother , diagnosed in her 70's     Films Compared: 05/20/2016 through 05/24/2022     Findings:  The breasts are heterogeneously dense, which may obscure small masses.   There is asymmetry with prominent fibrous tissues present in the 12   o'clock position of the left breast. No suspicious masses or   microcalcifications are seen. No interval changes have occurred.     A breast examination was performed, and no clinically suspicious palpable   masses were present.   Impression:    No radiographic evidence of malignancy. Routine screening mammograms are   recommended.    BI-RADS Category 1: Negative    Recommendation:  Routine screening mammogram in 1 year is recommended.    Your estimated lifetime risk of breast cancer (to age 85) based on   Tyrer-Cuzick risk assessment model is 12.02 %.  According to the American   Cancer Society, patients with a lifetime breast cancer risk of 20% or   higher might benefit from supplemental screening tests. ??          Office Visit on 09/19/2023   Component Date Value Ref Range Status    POC Amphetamines 09/19/2023 Negative  Negative, Inconclusive Final    POC Barbiturates 09/19/2023 Negative  Negative, Inconclusive Final    POC Benzodiazepines 09/19/2023 Negative  Negative, Inconclusive Final    POC Cocaine 09/19/2023 Negative  Negative, Inconclusive Final    POC THC 09/19/2023 Negative  Negative, Inconclusive Final    POC Methadone 09/19/2023 Negative  Negative, Inconclusive Final    POC Methamphetamine 09/19/2023 Negative  Negative, Inconclusive Final    POC Opiates 09/19/2023 Negative  Negative, Inconclusive Final    POC Oxycodone  09/19/2023 Presumptive Positive (A)  Negative, Inconclusive Final    POC Phencyclidine 09/19/2023 Negative  Negative, Inconclusive Final    POC Methylenedioxymethamphetamine * 09/19/2023 Negative  Negative, Inconclusive Final    POC Tricyclic Antidepressants 09/19/2023 Negative  Negative, Inconclusive Final    POC Buprenorphine 09/19/2023 Negative   Final     Acceptable 09/19/2023 Yes   Final    POC Temperature (Urine) 09/19/2023 92   Final         No orders of the defined types were placed in this encounter.        Requested Prescriptions     Signed Prescriptions Disp Refills    oxyCODONE-acetaminophen (PERCOCET)  mg per tablet 90 tablet 0     Sig: Take 1 tablet by mouth every 8 (eight) hours as needed for Pain.    oxyCODONE-acetaminophen (PERCOCET)  mg per tablet 90 tablet 0     Sig: Take 1 tablet by mouth every 8 (eight) hours as needed for Pain.       Assessment:     1. Lumbar radiculopathy, chronic    2. Chronic pain of right knee    3. Osteoarthrosis multiple sites, not specified as generalized    4. Chronic inflammatory arthritis         A's of Opioid Risk Assessment  Activity:Patient can perform ADL.   Analgesia:Patients pain is partially controlled by current medication. Patient has tried OTC medications such as Tylenol and Ibuprofen with out relief.   Adverse Effects: Patient denies constipation or sedation.  Aberrant Behavior:  reviewed with no aberrant drug seeking/taking behavior.  Overdose reversal drug naloxone discussed    Drug screen reviewed      MRI lumbar spine [facility] 2022 multiple level degenerative changes neuroforaminal narrowing L4/5 L5/S1 left greater than right         Plan:    Narcan January 2023    Follows rheumatology [facility] inflammatory arthritis    She had bilateral lumbar L4/5 TFESI # 2 March 22, 2022  She states she had 80% relief after procedure she states that procedure did help increase her level function she has had more than 3 months  pain relief following her procedure    Could not afford co-pay for any further procedures    No new complaints she states current medications helping control her discomfort    Continue current medication    Continue home exercise program as directed    Follow-up 2 months    Dr. Soto, July 2024    Bring original prescription medication bottles/container/box with labels to each visit

## 2023-11-16 ENCOUNTER — OFFICE VISIT (OUTPATIENT)
Dept: PAIN MEDICINE | Facility: CLINIC | Age: 58
End: 2023-11-16
Payer: MEDICARE

## 2023-11-16 VITALS
HEIGHT: 67 IN | WEIGHT: 203 LBS | BODY MASS INDEX: 31.86 KG/M2 | RESPIRATION RATE: 18 BRPM | SYSTOLIC BLOOD PRESSURE: 141 MMHG | HEART RATE: 73 BPM | DIASTOLIC BLOOD PRESSURE: 72 MMHG

## 2023-11-16 DIAGNOSIS — G89.29 CHRONIC PAIN OF RIGHT KNEE: Chronic | ICD-10-CM

## 2023-11-16 DIAGNOSIS — M19.90 CHRONIC INFLAMMATORY ARTHRITIS: ICD-10-CM

## 2023-11-16 DIAGNOSIS — M25.561 CHRONIC PAIN OF RIGHT KNEE: Chronic | ICD-10-CM

## 2023-11-16 DIAGNOSIS — M54.16 LUMBAR RADICULOPATHY, CHRONIC: Primary | Chronic | ICD-10-CM

## 2023-11-16 DIAGNOSIS — M89.49 OSTEOARTHROSIS MULTIPLE SITES, NOT SPECIFIED AS GENERALIZED: Chronic | ICD-10-CM

## 2023-11-16 PROCEDURE — 4010F ACE/ARB THERAPY RXD/TAKEN: CPT | Mod: CPTII,,, | Performed by: PHYSICIAN ASSISTANT

## 2023-11-16 PROCEDURE — 3008F BODY MASS INDEX DOCD: CPT | Mod: CPTII,,, | Performed by: PHYSICIAN ASSISTANT

## 2023-11-16 PROCEDURE — 99214 PR OFFICE/OUTPT VISIT, EST, LEVL IV, 30-39 MIN: ICD-10-PCS | Mod: S$PBB,,, | Performed by: PHYSICIAN ASSISTANT

## 2023-11-16 PROCEDURE — 3051F PR MOST RECENT HEMOGLOBIN A1C LEVEL 7.0 - < 8.0%: ICD-10-PCS | Mod: CPTII,,, | Performed by: PHYSICIAN ASSISTANT

## 2023-11-16 PROCEDURE — 3077F PR MOST RECENT SYSTOLIC BLOOD PRESSURE >= 140 MM HG: ICD-10-PCS | Mod: CPTII,,, | Performed by: PHYSICIAN ASSISTANT

## 2023-11-16 PROCEDURE — 3078F PR MOST RECENT DIASTOLIC BLOOD PRESSURE < 80 MM HG: ICD-10-PCS | Mod: CPTII,,, | Performed by: PHYSICIAN ASSISTANT

## 2023-11-16 PROCEDURE — 3008F PR BODY MASS INDEX (BMI) DOCUMENTED: ICD-10-PCS | Mod: CPTII,,, | Performed by: PHYSICIAN ASSISTANT

## 2023-11-16 PROCEDURE — 1159F PR MEDICATION LIST DOCUMENTED IN MEDICAL RECORD: ICD-10-PCS | Mod: CPTII,,, | Performed by: PHYSICIAN ASSISTANT

## 2023-11-16 PROCEDURE — 99215 OFFICE O/P EST HI 40 MIN: CPT | Mod: PBBFAC | Performed by: PHYSICIAN ASSISTANT

## 2023-11-16 PROCEDURE — 3077F SYST BP >= 140 MM HG: CPT | Mod: CPTII,,, | Performed by: PHYSICIAN ASSISTANT

## 2023-11-16 PROCEDURE — 99214 OFFICE O/P EST MOD 30 MIN: CPT | Mod: S$PBB,,, | Performed by: PHYSICIAN ASSISTANT

## 2023-11-16 PROCEDURE — 3051F HG A1C>EQUAL 7.0%<8.0%: CPT | Mod: CPTII,,, | Performed by: PHYSICIAN ASSISTANT

## 2023-11-16 PROCEDURE — 4010F PR ACE/ARB THEARPY RXD/TAKEN: ICD-10-PCS | Mod: CPTII,,, | Performed by: PHYSICIAN ASSISTANT

## 2023-11-16 PROCEDURE — 1159F MED LIST DOCD IN RCRD: CPT | Mod: CPTII,,, | Performed by: PHYSICIAN ASSISTANT

## 2023-11-16 PROCEDURE — 3078F DIAST BP <80 MM HG: CPT | Mod: CPTII,,, | Performed by: PHYSICIAN ASSISTANT

## 2023-11-16 RX ORDER — OXYCODONE AND ACETAMINOPHEN 10; 325 MG/1; MG/1
1 TABLET ORAL EVERY 8 HOURS PRN
Qty: 90 TABLET | Refills: 0 | Status: SHIPPED | OUTPATIENT
Start: 2023-12-18 | End: 2024-01-16 | Stop reason: SDUPTHER

## 2023-11-16 RX ORDER — OXYCODONE AND ACETAMINOPHEN 10; 325 MG/1; MG/1
1 TABLET ORAL EVERY 8 HOURS PRN
Qty: 90 TABLET | Refills: 0 | Status: SHIPPED | OUTPATIENT
Start: 2023-11-18 | End: 2023-12-18

## 2024-01-16 ENCOUNTER — OFFICE VISIT (OUTPATIENT)
Dept: PAIN MEDICINE | Facility: CLINIC | Age: 59
End: 2024-01-16
Payer: MEDICARE

## 2024-01-16 VITALS
RESPIRATION RATE: 18 BRPM | SYSTOLIC BLOOD PRESSURE: 143 MMHG | WEIGHT: 201 LBS | HEART RATE: 85 BPM | BODY MASS INDEX: 31.55 KG/M2 | HEIGHT: 67 IN | DIASTOLIC BLOOD PRESSURE: 73 MMHG

## 2024-01-16 DIAGNOSIS — M54.16 LUMBAR RADICULOPATHY, CHRONIC: Primary | Chronic | ICD-10-CM

## 2024-01-16 DIAGNOSIS — Z79.899 ENCOUNTER FOR LONG-TERM (CURRENT) USE OF OTHER MEDICATIONS: ICD-10-CM

## 2024-01-16 DIAGNOSIS — G89.29 CHRONIC PAIN OF RIGHT KNEE: Chronic | ICD-10-CM

## 2024-01-16 DIAGNOSIS — M89.49 OSTEOARTHROSIS MULTIPLE SITES, NOT SPECIFIED AS GENERALIZED: Chronic | ICD-10-CM

## 2024-01-16 DIAGNOSIS — M25.561 CHRONIC PAIN OF RIGHT KNEE: Chronic | ICD-10-CM

## 2024-01-16 DIAGNOSIS — M19.90 CHRONIC INFLAMMATORY ARTHRITIS: ICD-10-CM

## 2024-01-16 PROCEDURE — 80305 DRUG TEST PRSMV DIR OPT OBS: CPT | Mod: PBBFAC | Performed by: PHYSICIAN ASSISTANT

## 2024-01-16 PROCEDURE — 99215 OFFICE O/P EST HI 40 MIN: CPT | Mod: PBBFAC | Performed by: PHYSICIAN ASSISTANT

## 2024-01-16 PROCEDURE — 3077F SYST BP >= 140 MM HG: CPT | Mod: CPTII,,, | Performed by: PHYSICIAN ASSISTANT

## 2024-01-16 PROCEDURE — 99214 OFFICE O/P EST MOD 30 MIN: CPT | Mod: S$PBB,,, | Performed by: PHYSICIAN ASSISTANT

## 2024-01-16 PROCEDURE — 1159F MED LIST DOCD IN RCRD: CPT | Mod: CPTII,,, | Performed by: PHYSICIAN ASSISTANT

## 2024-01-16 PROCEDURE — 3078F DIAST BP <80 MM HG: CPT | Mod: CPTII,,, | Performed by: PHYSICIAN ASSISTANT

## 2024-01-16 PROCEDURE — 3008F BODY MASS INDEX DOCD: CPT | Mod: CPTII,,, | Performed by: PHYSICIAN ASSISTANT

## 2024-01-16 PROCEDURE — 99999PBSHW POCT URINE DRUG SCREEN PRESUMP: Mod: PBBFAC,,,

## 2024-01-16 RX ORDER — OXYCODONE AND ACETAMINOPHEN 10; 325 MG/1; MG/1
1 TABLET ORAL EVERY 8 HOURS PRN
Qty: 90 TABLET | Refills: 0 | Status: SHIPPED | OUTPATIENT
Start: 2024-01-17 | End: 2024-02-29 | Stop reason: SDUPTHER

## 2024-01-16 RX ORDER — OXYCODONE AND ACETAMINOPHEN 10; 325 MG/1; MG/1
1 TABLET ORAL EVERY 8 HOURS PRN
Qty: 90 TABLET | Refills: 0 | Status: SHIPPED | OUTPATIENT
Start: 2024-02-16 | End: 2024-02-29 | Stop reason: SDUPTHER

## 2024-01-16 RX ORDER — GABAPENTIN 400 MG/1
400 CAPSULE ORAL EVERY 12 HOURS
Qty: 60 CAPSULE | Refills: 1 | Status: SHIPPED | OUTPATIENT
Start: 2024-01-16 | End: 2024-02-29 | Stop reason: SDUPTHER

## 2024-01-16 NOTE — PROGRESS NOTES
Subjective:         Patient ID: Audrey Causey is a 58 y.o. female.    Chief Complaint: Low-back Pain        Pain  This is a chronic problem. The current episode started more than 1 year ago. The problem occurs daily. The problem has been unchanged. Associated symptoms include arthralgias and neck pain. Pertinent negatives include no anorexia, chest pain, chills, coughing, diaphoresis, fever, sore throat, vertigo or vomiting.     Review of Systems   Constitutional:  Negative for activity change, appetite change, chills, diaphoresis, fever and unexpected weight change.   HENT:  Negative for drooling, ear discharge, ear pain, facial swelling, nosebleeds, sore throat, trouble swallowing, voice change and goiter.    Eyes:  Negative for photophobia, pain, discharge, redness and visual disturbance.   Respiratory:  Negative for apnea, cough, choking, chest tightness, shortness of breath, wheezing and stridor.    Cardiovascular:  Negative for chest pain, palpitations and leg swelling.   Gastrointestinal:  Negative for abdominal distention, anorexia, diarrhea, rectal pain, vomiting and fecal incontinence.   Endocrine: Negative for cold intolerance, heat intolerance, polydipsia, polyphagia and polyuria.   Genitourinary:  Negative for bladder incontinence, dysuria, flank pain, frequency and hot flashes.   Musculoskeletal:  Positive for arthralgias, back pain, leg pain, neck pain and neck stiffness.   Integumentary:  Negative for color change and pallor.   Allergic/Immunologic: Negative for immunocompromised state.   Neurological:  Negative for dizziness, vertigo, seizures, syncope, facial asymmetry, speech difficulty, light-headedness, memory loss and coordination difficulties.   Hematological:  Negative for adenopathy. Does not bruise/bleed easily.   Psychiatric/Behavioral:  Negative for agitation, behavioral problems, confusion, decreased concentration, dysphoric mood, hallucinations, self-injury and suicidal ideas. The  patient is not nervous/anxious and is not hyperactive.            Past Medical History:   Diagnosis Date    Acute superficial gastritis without hemorrhage 2021    Arthritis     Diabetes mellitus     Diverticula, colon 2021    Esophageal dysphagia 2021    GERD (gastroesophageal reflux disease)     Hypertension     Low back pain     Lumbar radiculopathy     Seizures     Sleep apnea     Unspecified glaucoma      Past Surgical History:   Procedure Laterality Date     SECTION      COLONOSCOPY  2018    Dr. Naranjo    EPIDURAL STEROID INJECTION  2018    L4-5 MARCOS X 5 - Cook    EPIDURAL STEROID INJECTION  2017    L5-S1 MARCOS - Cook    EPIDURAL STEROID INJECTION N/A 2022    Procedure: Injection, Steroid, Epidural, L4/5;  Surgeon: Yari Soto MD;  Location: [facility];  Service: Pain Management;  Laterality: N/A;  MESSAGE LEFT ON VM TO BE TESTED     ESOPHAGOGASTRODUODENOSCOPY  2018    EYE SURGERY      EYE SURGERY Left     shank    GALLBLADDER SURGERY      HYSTERECTOMY      INJECTION OF FACET JOINT Bilateral 2017    Bilateral L3-S1 Facet Injection - Cook    KELOID EXCISION Bilateral 2023    Procedure: EXCISION, KELOID ON BILATERAL EARLOBES;  Surgeon: Moreno Xiong MD;  Location: [facility] OR;  Service: ENT;  Laterality: Bilateral;    LAPAROSCOPIC CHOLECYSTECTOMY N/A 2021    Procedure: CHOLECYSTECTOMY, LAPAROSCOPIC;  Surgeon: Amy Wagner MD;  Location: [facility] OR;  Service: General;  Laterality: N/A;  fully vaccinated    OOPHORECTOMY      TRANSFORAMINAL EPIDURAL INJECTION OF STEROID Bilateral 2022    Procedure: Injection,steroid,epidural,transforaminal approach, L4/5;  Surgeon: Yari Soto MD;  Location: [facility];  Service: Pain Management;  Laterality: Bilateral;  pt aware on ov to be tested     Social History     Socioeconomic History    Marital status:    Tobacco Use    Smoking status: Never      "Passive exposure: Never    Smokeless tobacco: Never   Substance and Sexual Activity    Alcohol use: Never    Drug use: Yes     Types: Oxycodone     Family History   Problem Relation Age of Onset    Hypertension Mother     Breast cancer Mother     Thyroid cancer Mother     Diabetes type II Mother     Stroke Mother     Heart attack Mother      Review of patient's allergies indicates:  No Known Allergies     Objective:  Vitals:    01/16/24 1059   BP: (!) 143/73   Pulse: 85   Resp: 18   Weight: 91.2 kg (201 lb)   Height: 5' 7" (1.702 m)   PainSc:   7           Physical Exam  Vitals and nursing note reviewed. Exam conducted with a chaperone present.   Constitutional:       General: She is awake.      Appearance: Normal appearance. She is not ill-appearing, toxic-appearing or diaphoretic.   HENT:      Head: Normocephalic and atraumatic.      Nose: Nose normal.      Mouth/Throat:      Mouth: Mucous membranes are moist.      Pharynx: Oropharynx is clear.   Eyes:      Conjunctiva/sclera: Conjunctivae normal.      Pupils: Pupils are equal, round, and reactive to light.   Cardiovascular:      Rate and Rhythm: Normal rate.   Pulmonary:      Effort: Pulmonary effort is normal. No respiratory distress.   Abdominal:      Palpations: Abdomen is soft.      Tenderness: There is no guarding.   Musculoskeletal:         General: Normal range of motion.      Cervical back: Normal range of motion and neck supple. Tenderness present. No rigidity.      Thoracic back: Tenderness present.      Lumbar back: Tenderness present.      Right knee: Tenderness present.      Left knee: Tenderness present.   Skin:     General: Skin is warm and dry.      Coloration: Skin is not jaundiced or pale.   Neurological:      General: No focal deficit present.      Mental Status: She is alert and oriented to person, place, and time. Mental status is at baseline.      Cranial Nerves: No cranial nerve deficit (II-XII).   Psychiatric:         Mood and Affect: Mood " normal.         Behavior: Behavior normal. Behavior is cooperative.         Thought Content: Thought content normal.           Mammo Digital Screening Bilat  Narrative: Result:  Mammo Digital Screening Bilat    History:  Patient is 57 y.o. and is seen for a screening mammogram.  Positive family history of breast cancer - mother , diagnosed in her 70's     Films Compared: 05/20/2016 through 05/24/2022     Findings:  The breasts are heterogeneously dense, which may obscure small masses.   There is asymmetry with prominent fibrous tissues present in the 12   o'clock position of the left breast. No suspicious masses or   microcalcifications are seen. No interval changes have occurred.     A breast examination was performed, and no clinically suspicious palpable   masses were present.   Impression:    No radiographic evidence of malignancy. Routine screening mammograms are   recommended.    BI-RADS Category 1: Negative    Recommendation:  Routine screening mammogram in 1 year is recommended.    Your estimated lifetime risk of breast cancer (to age 85) based on   Tyrer-Cuzick risk assessment model is 12.02 %.  According to the American   Cancer Society, patients with a lifetime breast cancer risk of 20% or   higher might benefit from supplemental screening tests. ??          Office Visit on 09/19/2023   Component Date Value Ref Range Status    POC Amphetamines 09/19/2023 Negative  Negative, Inconclusive Final    POC Barbiturates 09/19/2023 Negative  Negative, Inconclusive Final    POC Benzodiazepines 09/19/2023 Negative  Negative, Inconclusive Final    POC Cocaine 09/19/2023 Negative  Negative, Inconclusive Final    POC THC 09/19/2023 Negative  Negative, Inconclusive Final    POC Methadone 09/19/2023 Negative  Negative, Inconclusive Final    POC Methamphetamine 09/19/2023 Negative  Negative, Inconclusive Final    POC Opiates 09/19/2023 Negative  Negative, Inconclusive Final    POC Oxycodone 09/19/2023 Presumptive Positive  (A)  Negative, Inconclusive Final    POC Phencyclidine 09/19/2023 Negative  Negative, Inconclusive Final    POC Methylenedioxymethamphetamine * 09/19/2023 Negative  Negative, Inconclusive Final    POC Tricyclic Antidepressants 09/19/2023 Negative  Negative, Inconclusive Final    POC Buprenorphine 09/19/2023 Negative   Final     Acceptable 09/19/2023 Yes   Final    POC Temperature (Urine) 09/19/2023 92   Final         Orders Placed This Encounter   Procedures    POCT Urine Drug Screen Presump     Interpretive Information:     Negative:  No drug detected at the cut off level.   Positive:  This result represents presumptive positive for the   tested drug, other substances may yield a positive response other   than the analyte of interest. This result should be utilized for   diagnostic purpose only. Confirmation testing will be performed upon physician request only.            Requested Prescriptions     Signed Prescriptions Disp Refills    gabapentin (NEURONTIN) 400 MG capsule 60 capsule 1     Sig: Take 1 capsule (400 mg total) by mouth every 12 (twelve) hours.    oxyCODONE-acetaminophen (PERCOCET)  mg per tablet 90 tablet 0     Sig: Take 1 tablet by mouth every 8 (eight) hours as needed for Pain.    oxyCODONE-acetaminophen (PERCOCET)  mg per tablet 90 tablet 0     Sig: Take 1 tablet by mouth every 8 (eight) hours as needed for Pain.       Assessment:     1. Lumbar radiculopathy, chronic    2. Chronic pain of right knee    3. Osteoarthrosis multiple sites, not specified as generalized    4. Chronic inflammatory arthritis    5. Encounter for long-term (current) use of other medications         A's of Opioid Risk Assessment  Activity:Patient can perform ADL.   Analgesia:Patients pain is partially controlled by current medication. Patient has tried OTC medications such as Tylenol and Ibuprofen with out relief.   Adverse Effects: Patient denies constipation or sedation.  Aberrant Behavior:   reviewed with no aberrant drug seeking/taking behavior.  Overdose reversal drug naloxone discussed    Drug screen reviewed      MRI lumbar spine [facility] 2022 multiple level degenerative changes neuroforaminal narrowing L4/5 L5/S1 left greater than right         Plan:     [place]    Narcan January 2023    Bring medication x2    Follows rheumatology [facility] inflammatory arthritis    She had bilateral lumbar L4/5 TFESI # 2 March 22, 2022  She states she had 80% relief after procedure she states that procedure did help increase her level function she has had more than 3 months pain relief following her procedure      She states current medications helping her discomfort she would like to continue with current medication conservative treatment plan    Continue current medication    Continue home exercise program as directed    Follow-up 2 months    Dr. Soto, July 2024    Bring original prescription medication bottles/container/box with labels to each visit

## 2024-02-29 NOTE — PROGRESS NOTES
Subjective:         Patient ID: Audrey Causey is a 58 y.o. female.    Chief Complaint: Low-back Pain, Leg Pain (bilateral), and Foot Pain (bilateral)        Pain  This is a chronic problem. The current episode started more than 1 year ago. The problem occurs daily. The problem has been waxing and waning. Associated symptoms include arthralgias and neck pain. Pertinent negatives include no anorexia, chest pain, chills, coughing, diaphoresis, fever, sore throat, vertigo or vomiting.     Review of Systems   Constitutional:  Negative for activity change, appetite change, chills, diaphoresis, fever and unexpected weight change.   HENT:  Negative for drooling, ear discharge, ear pain, facial swelling, nosebleeds, sore throat, trouble swallowing, voice change and goiter.    Eyes:  Negative for photophobia, pain, discharge, redness and visual disturbance.   Respiratory:  Negative for apnea, cough, choking, chest tightness, shortness of breath, wheezing and stridor.    Cardiovascular:  Negative for chest pain, palpitations and leg swelling.   Gastrointestinal:  Negative for abdominal distention, anorexia, diarrhea, rectal pain, vomiting and fecal incontinence.   Endocrine: Negative for cold intolerance, heat intolerance, polydipsia, polyphagia and polyuria.   Genitourinary:  Negative for bladder incontinence, dysuria, flank pain, frequency and hot flashes.   Musculoskeletal:  Positive for arthralgias, back pain, leg pain, neck pain and neck stiffness.   Integumentary:  Negative for color change and pallor.   Allergic/Immunologic: Negative for immunocompromised state.   Neurological:  Negative for dizziness, vertigo, seizures, syncope, facial asymmetry, speech difficulty, light-headedness, memory loss and coordination difficulties.   Hematological:  Negative for adenopathy. Does not bruise/bleed easily.   Psychiatric/Behavioral:  Negative for agitation, behavioral problems, confusion, decreased concentration, dysphoric mood,  hallucinations, self-injury and suicidal ideas. The patient is not nervous/anxious and is not hyperactive.            Past Medical History:   Diagnosis Date    Acute superficial gastritis without hemorrhage 2021    Arthritis     Diabetes mellitus     Diverticula, colon 2021    Esophageal dysphagia 2021    GERD (gastroesophageal reflux disease)     Hypertension     Low back pain     Lumbar radiculopathy     Seizures     Sleep apnea     Unspecified glaucoma      Past Surgical History:   Procedure Laterality Date     SECTION      COLONOSCOPY  2018    Dr. Naranjo    EPIDURAL STEROID INJECTION  2018    L4-5 MARCOS X 5 - Cook    EPIDURAL STEROID INJECTION  2017    L5-S1 MARCOS - Cook    EPIDURAL STEROID INJECTION N/A 2022    Procedure: Injection, Steroid, Epidural, L4/5;  Surgeon: Yari Soto MD;  Location: [facility];  Service: Pain Management;  Laterality: N/A;  MESSAGE LEFT ON VM TO BE TESTED ON 1-14    ESOPHAGOGASTRODUODENOSCOPY  2018    EYE SURGERY      EYE SURGERY Left     shank    GALLBLADDER SURGERY      HYSTERECTOMY      INJECTION OF FACET JOINT Bilateral 2017    Bilateral L3-S1 Facet Injection - Cook    KELOID EXCISION Bilateral 2023    Procedure: EXCISION, KELOID ON BILATERAL EARLOBES;  Surgeon: Moreno Xiong MD;  Location: [facility] OR;  Service: ENT;  Laterality: Bilateral;    LAPAROSCOPIC CHOLECYSTECTOMY N/A 2021    Procedure: CHOLECYSTECTOMY, LAPAROSCOPIC;  Surgeon: Amy Wagner MD;  Location: [facility] OR;  Service: General;  Laterality: N/A;  fully vaccinated    OOPHORECTOMY      TRANSFORAMINAL EPIDURAL INJECTION OF STEROID Bilateral 2022    Procedure: Injection,steroid,epidural,transforaminal approach, L4/5;  Surgeon: Yari Soto MD;  Location: [facility];  Service: Pain Management;  Laterality: Bilateral;  pt aware on ov to be tested     Social History     Socioeconomic History    Marital  "status:    Tobacco Use    Smoking status: Never     Passive exposure: Never    Smokeless tobacco: Never   Substance and Sexual Activity    Alcohol use: Never    Drug use: Yes     Types: Oxycodone     Family History   Problem Relation Age of Onset    Hypertension Mother     Breast cancer Mother     Thyroid cancer Mother     Diabetes type II Mother     Stroke Mother     Heart attack Mother      Review of patient's allergies indicates:  No Known Allergies     Objective:  Vitals:    03/05/24 0816   BP: (!) 148/77   Pulse: 87   Resp: 18   Weight: 91.2 kg (201 lb)   Height: 5' 7" (1.702 m)   PainSc:   7           Physical Exam  Vitals and nursing note reviewed. Exam conducted with a chaperone present.   Constitutional:       General: She is awake.      Appearance: Normal appearance. She is not ill-appearing, toxic-appearing or diaphoretic.   HENT:      Head: Normocephalic and atraumatic.      Nose: Nose normal.      Mouth/Throat:      Mouth: Mucous membranes are moist.      Pharynx: Oropharynx is clear.   Eyes:      Conjunctiva/sclera: Conjunctivae normal.      Pupils: Pupils are equal, round, and reactive to light.   Cardiovascular:      Rate and Rhythm: Normal rate.   Pulmonary:      Effort: Pulmonary effort is normal. No respiratory distress.   Abdominal:      Palpations: Abdomen is soft.      Tenderness: There is no guarding.   Musculoskeletal:         General: Normal range of motion.      Cervical back: Normal range of motion and neck supple. Tenderness present. No rigidity.      Thoracic back: Tenderness present.      Lumbar back: Tenderness present.      Right knee: Tenderness present.      Left knee: Tenderness present.   Skin:     General: Skin is warm and dry.      Coloration: Skin is not jaundiced or pale.   Neurological:      General: No focal deficit present.      Mental Status: She is alert and oriented to person, place, and time. Mental status is at baseline.      Cranial Nerves: No cranial nerve " deficit (II-XII).   Psychiatric:         Mood and Affect: Mood normal.         Behavior: Behavior normal. Behavior is cooperative.         Thought Content: Thought content normal.           Mammo Digital Screening Bilat  Narrative: Result:  Mammo Digital Screening Bilat    History:  Patient is 57 y.o. and is seen for a screening mammogram.  Positive family history of breast cancer - mother , diagnosed in her 70's     Films Compared: 05/20/2016 through 05/24/2022     Findings:  The breasts are heterogeneously dense, which may obscure small masses.   There is asymmetry with prominent fibrous tissues present in the 12   o'clock position of the left breast. No suspicious masses or   microcalcifications are seen. No interval changes have occurred.     A breast examination was performed, and no clinically suspicious palpable   masses were present.   Impression:    No radiographic evidence of malignancy. Routine screening mammograms are   recommended.    BI-RADS Category 1: Negative    Recommendation:  Routine screening mammogram in 1 year is recommended.    Your estimated lifetime risk of breast cancer (to age 85) based on   Tyrer-Cuzick risk assessment model is 12.02 %.  According to the American   Cancer Society, patients with a lifetime breast cancer risk of 20% or   higher might benefit from supplemental screening tests. ??          Office Visit on 01/16/2024   Component Date Value Ref Range Status    POC Amphetamines 01/16/2024 Negative  Negative, Inconclusive Final    POC Barbiturates 01/16/2024 Negative  Negative, Inconclusive Final    POC Benzodiazepines 01/16/2024 Negative  Negative, Inconclusive Final    POC Cocaine 01/16/2024 Negative  Negative, Inconclusive Final    POC THC 01/16/2024 Negative  Negative, Inconclusive Final    POC Methadone 01/16/2024 Negative  Negative, Inconclusive Final    POC Methamphetamine 01/16/2024 Negative  Negative, Inconclusive Final    POC Opiates 01/16/2024 Negative  Negative,  Inconclusive Final    POC Oxycodone 01/16/2024 Presumptive Positive (A)  Negative, Inconclusive Final    POC Phencyclidine 01/16/2024 Negative  Negative, Inconclusive Final    POC Methylenedioxymethamphetamine * 01/16/2024 Negative  Negative, Inconclusive Final    POC Tricyclic Antidepressants 01/16/2024 Negative  Negative, Inconclusive Final    POC Buprenorphine 01/16/2024 Negative   Final     Acceptable 01/16/2024 Yes   Final    POC Temperature (Urine) 01/16/2024 92   Final   Office Visit on 09/19/2023   Component Date Value Ref Range Status    POC Amphetamines 09/19/2023 Negative  Negative, Inconclusive Final    POC Barbiturates 09/19/2023 Negative  Negative, Inconclusive Final    POC Benzodiazepines 09/19/2023 Negative  Negative, Inconclusive Final    POC Cocaine 09/19/2023 Negative  Negative, Inconclusive Final    POC THC 09/19/2023 Negative  Negative, Inconclusive Final    POC Methadone 09/19/2023 Negative  Negative, Inconclusive Final    POC Methamphetamine 09/19/2023 Negative  Negative, Inconclusive Final    POC Opiates 09/19/2023 Negative  Negative, Inconclusive Final    POC Oxycodone 09/19/2023 Presumptive Positive (A)  Negative, Inconclusive Final    POC Phencyclidine 09/19/2023 Negative  Negative, Inconclusive Final    POC Methylenedioxymethamphetamine * 09/19/2023 Negative  Negative, Inconclusive Final    POC Tricyclic Antidepressants 09/19/2023 Negative  Negative, Inconclusive Final    POC Buprenorphine 09/19/2023 Negative   Final     Acceptable 09/19/2023 Yes   Final    POC Temperature (Urine) 09/19/2023 92   Final         No orders of the defined types were placed in this encounter.        Requested Prescriptions     Signed Prescriptions Disp Refills    gabapentin (NEURONTIN) 400 MG capsule 60 capsule 1     Sig: Take 1 capsule (400 mg total) by mouth every 12 (twelve) hours.    oxyCODONE-acetaminophen (PERCOCET)  mg per tablet 90 tablet 0     Sig: Take 1 tablet by  mouth every 8 (eight) hours as needed for Pain.    oxyCODONE-acetaminophen (PERCOCET)  mg per tablet 90 tablet 0     Sig: Take 1 tablet by mouth every 8 (eight) hours as needed for Pain.       Assessment:     1. Lumbar radiculopathy, chronic    2. Chronic pain of right knee    3. Osteoarthrosis multiple sites, not specified as generalized    4. Chronic inflammatory arthritis         A's of Opioid Risk Assessment  Activity:Patient can perform ADL.   Analgesia:Patients pain is partially controlled by current medication. Patient has tried OTC medications such as Tylenol and Ibuprofen with out relief.   Adverse Effects: Patient denies constipation or sedation.  Aberrant Behavior:  reviewed with no aberrant drug seeking/taking behavior.  Overdose reversal drug naloxone discussed    Drug screen reviewed      MRI lumbar spine [facility] 2022 multiple level degenerative changes neuroforaminal narrowing L4/5 L5/S1 left greater than right         Plan:     [place]    Narcan January 2023    Follows rheumatology [facility] inflammatory arthritis    She had bilateral lumbar L4/5 TFESI # 2 March 22, 2022  She states she had 80% relief after procedure she states that procedure did help increase her level function she has had more than 3 months pain relief following her procedure      No new complaints doing well current medication    Pill count correct today    Continue current medication    Continue home exercise program as directed    Follow-up 2 months    Dr. Soto, July 2024    Bring original prescription medication bottles/container/box with labels to each visit

## 2024-03-05 ENCOUNTER — OFFICE VISIT (OUTPATIENT)
Dept: PAIN MEDICINE | Facility: CLINIC | Age: 59
End: 2024-03-05
Payer: MEDICARE

## 2024-03-05 VITALS
HEART RATE: 87 BPM | SYSTOLIC BLOOD PRESSURE: 148 MMHG | WEIGHT: 201 LBS | DIASTOLIC BLOOD PRESSURE: 77 MMHG | RESPIRATION RATE: 18 BRPM | BODY MASS INDEX: 31.55 KG/M2 | HEIGHT: 67 IN

## 2024-03-05 DIAGNOSIS — M25.561 CHRONIC PAIN OF RIGHT KNEE: Chronic | ICD-10-CM

## 2024-03-05 DIAGNOSIS — M19.90 CHRONIC INFLAMMATORY ARTHRITIS: ICD-10-CM

## 2024-03-05 DIAGNOSIS — M89.49 OSTEOARTHROSIS MULTIPLE SITES, NOT SPECIFIED AS GENERALIZED: Chronic | ICD-10-CM

## 2024-03-05 DIAGNOSIS — G89.29 CHRONIC PAIN OF RIGHT KNEE: Chronic | ICD-10-CM

## 2024-03-05 DIAGNOSIS — M54.16 LUMBAR RADICULOPATHY, CHRONIC: Primary | Chronic | ICD-10-CM

## 2024-03-05 PROCEDURE — 1159F MED LIST DOCD IN RCRD: CPT | Mod: CPTII,,, | Performed by: PHYSICIAN ASSISTANT

## 2024-03-05 PROCEDURE — 99215 OFFICE O/P EST HI 40 MIN: CPT | Mod: PBBFAC | Performed by: PHYSICIAN ASSISTANT

## 2024-03-05 PROCEDURE — 99214 OFFICE O/P EST MOD 30 MIN: CPT | Mod: S$PBB,,, | Performed by: PHYSICIAN ASSISTANT

## 2024-03-05 PROCEDURE — 3077F SYST BP >= 140 MM HG: CPT | Mod: CPTII,,, | Performed by: PHYSICIAN ASSISTANT

## 2024-03-05 PROCEDURE — 3078F DIAST BP <80 MM HG: CPT | Mod: CPTII,,, | Performed by: PHYSICIAN ASSISTANT

## 2024-03-05 PROCEDURE — 3008F BODY MASS INDEX DOCD: CPT | Mod: CPTII,,, | Performed by: PHYSICIAN ASSISTANT

## 2024-03-05 RX ORDER — OXYCODONE AND ACETAMINOPHEN 10; 325 MG/1; MG/1
1 TABLET ORAL EVERY 8 HOURS PRN
Qty: 90 TABLET | Refills: 0 | Status: SHIPPED | OUTPATIENT
Start: 2024-04-16 | End: 2024-05-02 | Stop reason: SDUPTHER

## 2024-03-05 RX ORDER — GABAPENTIN 400 MG/1
400 CAPSULE ORAL EVERY 12 HOURS
Qty: 60 CAPSULE | Refills: 1 | Status: SHIPPED | OUTPATIENT
Start: 2024-03-05 | End: 2024-05-02 | Stop reason: SDUPTHER

## 2024-03-05 RX ORDER — OXYCODONE AND ACETAMINOPHEN 10; 325 MG/1; MG/1
1 TABLET ORAL EVERY 8 HOURS PRN
Qty: 90 TABLET | Refills: 0 | Status: SHIPPED | OUTPATIENT
Start: 2024-03-16 | End: 2024-05-02 | Stop reason: SDUPTHER

## 2024-05-02 NOTE — PROGRESS NOTES
Subjective:         Patient ID: Audrey Causey is a 58 y.o. female.    Chief Complaint: Neck Pain, Low-back Pain, Leg Pain, and Foot Pain        Pain  This is a chronic problem. The current episode started more than 1 year ago. The problem occurs daily. The problem has been unchanged. Associated symptoms include arthralgias and neck pain. Pertinent negatives include no anorexia, chest pain, chills, coughing, diaphoresis, fever, sore throat, vertigo or vomiting.     Review of Systems   Constitutional:  Negative for activity change, appetite change, chills, diaphoresis, fever and unexpected weight change.   HENT:  Negative for drooling, ear discharge, ear pain, facial swelling, nosebleeds, sore throat, trouble swallowing, voice change and goiter.    Eyes:  Negative for photophobia, pain, discharge, redness and visual disturbance.   Respiratory:  Negative for apnea, cough, choking, chest tightness, shortness of breath, wheezing and stridor.    Cardiovascular:  Negative for chest pain, palpitations and leg swelling.   Gastrointestinal:  Negative for abdominal distention, anorexia, diarrhea, rectal pain, vomiting and fecal incontinence.   Endocrine: Negative for cold intolerance, heat intolerance, polydipsia, polyphagia and polyuria.   Genitourinary:  Negative for bladder incontinence, dysuria, flank pain, frequency and hot flashes.   Musculoskeletal:  Positive for arthralgias, back pain, leg pain, neck pain and neck stiffness.   Integumentary:  Negative for color change and pallor.   Allergic/Immunologic: Negative for immunocompromised state.   Neurological:  Negative for dizziness, vertigo, seizures, syncope, facial asymmetry, speech difficulty, light-headedness, memory loss and coordination difficulties.   Hematological:  Negative for adenopathy. Does not bruise/bleed easily.   Psychiatric/Behavioral:  Negative for agitation, behavioral problems, confusion, decreased concentration, dysphoric mood, hallucinations,  self-injury and suicidal ideas. The patient is not nervous/anxious and is not hyperactive.            Past Medical History:   Diagnosis Date    Acute superficial gastritis without hemorrhage 2021    Arthritis     Diabetes mellitus     Diverticula, colon 2021    Esophageal dysphagia 2021    GERD (gastroesophageal reflux disease)     Hypertension     Low back pain     Lumbar radiculopathy     Seizures     Sleep apnea     Unspecified glaucoma      Past Surgical History:   Procedure Laterality Date     SECTION      COLONOSCOPY  2018    Dr. Naranjo    EPIDURAL STEROID INJECTION  2018    L4-5 MARCOS X 5 - Cook    EPIDURAL STEROID INJECTION  2017    L5-S1 MARCOS - Cook    EPIDURAL STEROID INJECTION N/A 2022    Procedure: Injection, Steroid, Epidural, L4/5;  Surgeon: Yari Soto MD;  [facility]  Service: Pain Management;  Laterality: N/A;  MESSAGE LEFT ON VM TO BE TESTED ON 1-14    ESOPHAGOGASTRODUODENOSCOPY  2018    EYE SURGERY      EYE SURGERY Left     shank    GALLBLADDER SURGERY      HYSTERECTOMY      INJECTION OF FACET JOINT Bilateral 2017    Bilateral L3-S1 Facet Injection - Cook    KELOID EXCISION Bilateral 2023    Procedure: EXCISION, KELOID ON BILATERAL EARLOBES;  Surgeon: Moreno Xiong MD;  [facility];  Service: ENT;  Laterality: Bilateral;    LAPAROSCOPIC CHOLECYSTECTOMY N/A 2021    Procedure: CHOLECYSTECTOMY, LAPAROSCOPIC;  Surgeon: Amy Wagner MD;  Location: [facility];  Service: General;  Laterality: N/A;  fully vaccinated    OOPHORECTOMY      TRANSFORAMINAL EPIDURAL INJECTION OF STEROID Bilateral 2022    Procedure: Injection,steroid,epidural,transforaminal approach, L4/5;  Surgeon: Yari Soto MD;  [facility];  Service: Pain Management;  Laterality: Bilateral;  pt aware on ov to be tested     Social History     Socioeconomic History    Marital status:   "  Tobacco Use    Smoking status: Never     Passive exposure: Never    Smokeless tobacco: Never   Substance and Sexual Activity    Alcohol use: Never    Drug use: Yes     Types: Oxycodone     Social Determinants of Health     Financial Resource Strain: Not on File (2022)    [facility] from [facility]     Financial Resource Strain     Financial Resource Strain: 0   Food Insecurity: Not on File (2022)    Received from [facility]     Food Insecurity     Food: 0   Transportation Needs: Not on File (2022)    Received from [facility]     Transportation Needs     Transportation: 0   Physical Activity: Not on File (2022)    Received from [facility]     Physical Activity     Physical Activity: 0   Stress: Not on File (2022)    Received from [facility]     Stress     Stress: 0   Housing Stability: Not on File (2022)    Received from [facility]     Housing Stability     Housin     Family History   Problem Relation Name Age of Onset    Hypertension Mother      Breast cancer Mother      Thyroid cancer Mother      Diabetes type II Mother      Stroke Mother      Heart attack Mother       Review of patient's allergies indicates:  No Known Allergies     Objective:  Vitals:    24 0817   BP: 112/71   Pulse: 87   Resp: 18   Weight: 92.1 kg (203 lb)   Height: 5' 7" (1.702 m)   PainSc:   6             Physical Exam  Vitals and nursing note reviewed. Exam conducted with a chaperone present.   Constitutional:       General: She is awake.      Appearance: Normal appearance. She is not ill-appearing, toxic-appearing or diaphoretic.   HENT:      Head: Normocephalic and atraumatic.      Nose: Nose normal.      Mouth/Throat:      Mouth: Mucous membranes are moist.      Pharynx: Oropharynx is clear.   Eyes:      Conjunctiva/sclera: Conjunctivae normal.      Pupils: Pupils are equal, round, and reactive to light.   Cardiovascular:      Rate and Rhythm: Normal rate.   Pulmonary:      Effort: " Pulmonary effort is normal. No respiratory distress.   Abdominal:      Palpations: Abdomen is soft.      Tenderness: There is no guarding.   Musculoskeletal:         General: Normal range of motion.      Cervical back: Normal range of motion and neck supple. Tenderness present. No rigidity.      Thoracic back: Tenderness present.      Lumbar back: Tenderness present.      Right knee: Tenderness present.      Left knee: Tenderness present.   Skin:     General: Skin is warm and dry.      Coloration: Skin is not jaundiced or pale.   Neurological:      General: No focal deficit present.      Mental Status: She is alert and oriented to person, place, and time. Mental status is at baseline.      Cranial Nerves: No cranial nerve deficit (II-XII).   Psychiatric:         Mood and Affect: Mood normal.         Behavior: Behavior normal. Behavior is cooperative.         Thought Content: Thought content normal.           Mammo Digital Screening Bilat  Narrative: Result:  Mammo Digital Screening Bilat    History:  Patient is 57 y.o. and is seen for a screening mammogram.  Positive family history of breast cancer - mother , diagnosed in her 70's     Films Compared: 05/20/2016 through 05/24/2022     Findings:  The breasts are heterogeneously dense, which may obscure small masses.   There is asymmetry with prominent fibrous tissues present in the 12   o'clock position of the left breast. No suspicious masses or   microcalcifications are seen. No interval changes have occurred.     A breast examination was performed, and no clinically suspicious palpable   masses were present.   Impression:    No radiographic evidence of malignancy. Routine screening mammograms are   recommended.    BI-RADS Category 1: Negative    Recommendation:  Routine screening mammogram in 1 year is recommended.    Your estimated lifetime risk of breast cancer (to age 85) based on   Tyrer-Cuzick risk assessment model is 12.02 %.  According to the American    Cancer Society, patients with a lifetime breast cancer risk of 20% or   higher might benefit from supplemental screening tests. ??          Office Visit on 01/16/2024   Component Date Value Ref Range Status    POC Amphetamines 01/16/2024 Negative  Negative, Inconclusive Final    POC Barbiturates 01/16/2024 Negative  Negative, Inconclusive Final    POC Benzodiazepines 01/16/2024 Negative  Negative, Inconclusive Final    POC Cocaine 01/16/2024 Negative  Negative, Inconclusive Final    POC THC 01/16/2024 Negative  Negative, Inconclusive Final    POC Methadone 01/16/2024 Negative  Negative, Inconclusive Final    POC Methamphetamine 01/16/2024 Negative  Negative, Inconclusive Final    POC Opiates 01/16/2024 Negative  Negative, Inconclusive Final    POC Oxycodone 01/16/2024 Presumptive Positive (A)  Negative, Inconclusive Final    POC Phencyclidine 01/16/2024 Negative  Negative, Inconclusive Final    POC Methylenedioxymethamphetamine * 01/16/2024 Negative  Negative, Inconclusive Final    POC Tricyclic Antidepressants 01/16/2024 Negative  Negative, Inconclusive Final    POC Buprenorphine 01/16/2024 Negative   Final     Acceptable 01/16/2024 Yes   Final    POC Temperature (Urine) 01/16/2024 92   Final         Orders Placed This Encounter   Procedures    MRI Previous     Standing Status:   Future     Number of Occurrences:   1     Standing Expiration Date:   8/14/2024         Requested Prescriptions     Signed Prescriptions Disp Refills    gabapentin (NEURONTIN) 400 MG capsule 60 capsule 1     Sig: Take 1 capsule (400 mg total) by mouth every 12 (twelve) hours.    oxyCODONE-acetaminophen (PERCOCET)  mg per tablet 90 tablet 0     Sig: Take 1 tablet by mouth every 8 (eight) hours as needed for Pain.    oxyCODONE-acetaminophen (PERCOCET)  mg per tablet 90 tablet 0     Sig: Take 1 tablet by mouth every 8 (eight) hours as needed for Pain.       Assessment:     1. Lumbar radiculopathy, chronic    2.  Chronic pain of right knee    3. Osteoarthrosis multiple sites, not specified as generalized    4. Chronic inflammatory arthritis           A's of Opioid Risk Assessment  Activity:Patient can perform ADL.   Analgesia:Patients pain is partially controlled by current medication. Patient has tried OTC medications such as Tylenol and Ibuprofen with out relief.   Adverse Effects: Patient denies constipation or sedation.  Aberrant Behavior:  reviewed with no aberrant drug seeking/taking behavior.  Overdose reversal drug naloxone discussed    Drug screen reviewed      MRI lumbar spine [facility] 2022 multiple level degenerative changes neuroforaminal narrowing L4/5 L5/S1 left greater than right         Plan:     [place]    Narcan January 2023    Follows rheumatology [facility] inflammatory arthritis    She had bilateral lumbar L4/5 TFESI # 2 March 22, 2022  She states she had 80% relief after procedure she states that procedure did help increase her level function she has had more than 3 months pain relief following her procedure    Continues complaint neck and arm pain numbness and tingling back and leg pain numbness and tingling radicular in nature     She states current medication does help with her discomfort    [facility] April 30, 2024  C3/4 1 mm retrolisthesis posterior disc herniation moderate spinal canal stenosis mild bilateral foraminal stenosis  C4/5 severe spinal canal stenosis severe right and mild left foraminal stenosis  C5/6 severe spinal canal stenosis moderate foraminal stenosis  C6/7 moderate spinal canal canal stenosis mild foraminal stenosis bilateral    MRI lumbar spine [facility] April 30, 2024  Loss of the normal lordotic curvature  Multiple level degenerative changes  L3/4 left paracentral neuroforaminal narrowing due to disc herniation  Mild left foraminal stenosis  L4/5 mild bilateral foraminal  stenosis  L5/S1 no spinal canal or foraminal stenosis    May 14, 2024, Pill count correct today    After discussing options she would like to continue with conservative management     She declines surgical evaluation for cervical and lumbar radiculopathy    Continue current medication    Continue home exercise program as directed    Follow-up 2 months    Dr. Soto, July 2024    Bring original prescription medication bottles/container/box with labels to each visit

## 2024-05-14 ENCOUNTER — OFFICE VISIT (OUTPATIENT)
Dept: PAIN MEDICINE | Facility: CLINIC | Age: 59
End: 2024-05-14
Payer: MEDICARE

## 2024-05-14 VITALS
SYSTOLIC BLOOD PRESSURE: 112 MMHG | BODY MASS INDEX: 31.86 KG/M2 | DIASTOLIC BLOOD PRESSURE: 71 MMHG | HEIGHT: 67 IN | WEIGHT: 203 LBS | HEART RATE: 87 BPM | RESPIRATION RATE: 18 BRPM

## 2024-05-14 DIAGNOSIS — M54.16 LUMBAR RADICULOPATHY, CHRONIC: Primary | ICD-10-CM

## 2024-05-14 DIAGNOSIS — M25.561 CHRONIC PAIN OF RIGHT KNEE: Chronic | ICD-10-CM

## 2024-05-14 DIAGNOSIS — M19.90 CHRONIC INFLAMMATORY ARTHRITIS: ICD-10-CM

## 2024-05-14 DIAGNOSIS — G89.29 CHRONIC PAIN OF RIGHT KNEE: Chronic | ICD-10-CM

## 2024-05-14 DIAGNOSIS — M89.49 OSTEOARTHROSIS MULTIPLE SITES, NOT SPECIFIED AS GENERALIZED: Chronic | ICD-10-CM

## 2024-05-14 PROCEDURE — 3074F SYST BP LT 130 MM HG: CPT | Mod: CPTII,,, | Performed by: PHYSICIAN ASSISTANT

## 2024-05-14 PROCEDURE — 99214 OFFICE O/P EST MOD 30 MIN: CPT | Mod: S$PBB,,, | Performed by: PHYSICIAN ASSISTANT

## 2024-05-14 PROCEDURE — 3008F BODY MASS INDEX DOCD: CPT | Mod: CPTII,,, | Performed by: PHYSICIAN ASSISTANT

## 2024-05-14 PROCEDURE — 99214 OFFICE O/P EST MOD 30 MIN: CPT | Mod: PBBFAC | Performed by: PHYSICIAN ASSISTANT

## 2024-05-14 PROCEDURE — 4010F ACE/ARB THERAPY RXD/TAKEN: CPT | Mod: CPTII,,, | Performed by: PHYSICIAN ASSISTANT

## 2024-05-14 PROCEDURE — 3078F DIAST BP <80 MM HG: CPT | Mod: CPTII,,, | Performed by: PHYSICIAN ASSISTANT

## 2024-05-14 RX ORDER — OXYCODONE AND ACETAMINOPHEN 10; 325 MG/1; MG/1
1 TABLET ORAL EVERY 8 HOURS PRN
Qty: 90 TABLET | Refills: 0 | Status: SHIPPED | OUTPATIENT
Start: 2024-06-15 | End: 2024-07-15

## 2024-05-14 RX ORDER — GABAPENTIN 400 MG/1
400 CAPSULE ORAL EVERY 12 HOURS
Qty: 60 CAPSULE | Refills: 1 | Status: SHIPPED | OUTPATIENT
Start: 2024-05-14

## 2024-05-14 RX ORDER — OXYCODONE AND ACETAMINOPHEN 10; 325 MG/1; MG/1
1 TABLET ORAL EVERY 8 HOURS PRN
Qty: 90 TABLET | Refills: 0 | Status: SHIPPED | OUTPATIENT
Start: 2024-05-16 | End: 2024-06-15

## 2024-05-14 RX ORDER — GLIPIZIDE 5 MG/1
5 TABLET, FILM COATED, EXTENDED RELEASE ORAL 2 TIMES DAILY
COMMUNITY

## 2024-05-22 NOTE — INTERVAL H&P NOTE
The patient has been examined and the H&P has been reviewed:    I concur with the findings and no changes have occurred since H&P was written.    Surgery risks, benefits and alternative options discussed and understood by patient/family.          There are no hospital problems to display for this patient.    
Statement Selected

## 2024-07-02 NOTE — PROGRESS NOTES
Subjective:         Patient ID: Audrey Causey is a 58 y.o. female.    Chief Complaint: Low-back Pain, Foot Pain, Leg Pain, Arm Pain, and Neck Pain        Pain  This is a chronic problem. The current episode started more than 1 year ago. The problem occurs daily. The problem has been waxing and waning. Associated symptoms include arthralgias and neck pain. Pertinent negatives include no anorexia, chest pain, chills, coughing, diaphoresis, fever, sore throat, vertigo or vomiting.     Review of Systems   Constitutional:  Negative for activity change, appetite change, chills, diaphoresis, fever and unexpected weight change.   HENT:  Negative for drooling, ear discharge, ear pain, facial swelling, nosebleeds, sore throat, trouble swallowing, voice change and goiter.    Eyes:  Negative for photophobia, pain, discharge, redness and visual disturbance.   Respiratory:  Negative for apnea, cough, choking, chest tightness, shortness of breath, wheezing and stridor.    Cardiovascular:  Negative for chest pain, palpitations and leg swelling.   Gastrointestinal:  Negative for abdominal distention, anorexia, diarrhea, rectal pain, vomiting and fecal incontinence.   Endocrine: Negative for cold intolerance, heat intolerance, polydipsia, polyphagia and polyuria.   Genitourinary:  Negative for bladder incontinence, dysuria, flank pain, frequency and hot flashes.   Musculoskeletal:  Positive for arthralgias, back pain, leg pain, neck pain and neck stiffness.   Integumentary:  Negative for color change and pallor.   Allergic/Immunologic: Negative for immunocompromised state.   Neurological:  Negative for dizziness, vertigo, seizures, syncope, facial asymmetry, speech difficulty, light-headedness, memory loss and coordination difficulties.   Hematological:  Negative for adenopathy. Does not bruise/bleed easily.   Psychiatric/Behavioral:  Negative for agitation, behavioral problems, confusion, decreased concentration, dysphoric mood,  hallucinations, self-injury and suicidal ideas. The patient is not nervous/anxious and is not hyperactive.            Past Medical History:   Diagnosis Date    Acute superficial gastritis without hemorrhage 2021    Arthritis     Diabetes mellitus     Diverticula, colon 2021    Esophageal dysphagia 2021    GERD (gastroesophageal reflux disease)     Hypertension     Low back pain     Lumbar radiculopathy     Seizures     Sleep apnea     Unspecified glaucoma      Past Surgical History:   Procedure Laterality Date     SECTION      COLONOSCOPY  2018    Dr. Naranjo    EPIDURAL STEROID INJECTION  2018    L4-5 MARCOS X 5 - Cook    EPIDURAL STEROID INJECTION  2017    L5-S1 MARCOS - Cook    EPIDURAL STEROID INJECTION N/A 2022    Procedure: Injection, Steroid, Epidural, L4/5;  Surgeon: Yari Soto MD;  Location: [facility];  Service: Pain Management;  Laterality: N/A;  MESSAGE LEFT ON VM TO BE TESTED ON 1-14    ESOPHAGOGASTRODUODENOSCOPY  2018    EYE SURGERY      EYE SURGERY Left     shank    GALLBLADDER SURGERY      HYSTERECTOMY      INJECTION OF FACET JOINT Bilateral 2017    Bilateral L3-S1 Facet Injection - Cook    KELOID EXCISION Bilateral 2023    Procedure: EXCISION, KELOID ON BILATERAL EARLOBES;  Surgeon: Moreno Xiong MD;  Location: [facility] OR;  Service: ENT;  Laterality: Bilateral;    LAPAROSCOPIC CHOLECYSTECTOMY N/A 2021    Procedure: CHOLECYSTECTOMY, LAPAROSCOPIC;  Surgeon: Amy Wagner MD;  Location: [facility] OR;  Service: General;  Laterality: N/A;  fully vaccinated    OOPHORECTOMY      TRANSFORAMINAL EPIDURAL INJECTION OF STEROID Bilateral 2022    Procedure: Injection,steroid,epidural,transforaminal approach, L4/5;  Surgeon: Yari Soto MD;  Location: [facility];  Service: Pain Management;  Laterality: Bilateral;  pt aware on ov to be tested     Social History     Socioeconomic History    Marital  "status:    Tobacco Use    Smoking status: Never     Passive exposure: Never    Smokeless tobacco: Never   Substance and Sexual Activity    Alcohol use: Never    Drug use: Yes     Types: Oxycodone     Social Determinants of Health     Financial Resource Strain: Not on File (2022)    [facility] from [facility]    Financial Resource Strain     Financial Resource Strain: 0   Food Insecurity: Not on File (2022)    [facility] from [facility]    Food Insecurity     Food: 0   Transportation Needs: Not on File (2022)    [facility] from [facility]    Transportation Needs     Transportation: 0   Physical Activity: Not on File (2022)    [facility] from [facility]    Physical Activity     Physical Activity: 0   Stress: Not on File (2022)    Received from [facility]    Stress     Stress: 0   Housing Stability: Not on File (2022)    Received from [facility]    Housing Stability     Housin     Family History   Problem Relation Name Age of Onset    Hypertension Mother      Breast cancer Mother      Thyroid cancer Mother      Diabetes type II Mother      Stroke Mother      Heart attack Mother       Review of patient's allergies indicates:  No Known Allergies     Objective:  Vitals:    24 0818   BP: 129/78   Pulse: 77   Resp: 18   Weight: 91.2 kg (201 lb)   Height: 5' 7" (1.702 m)   PainSc:   7               Physical Exam  Vitals and nursing note reviewed. Exam conducted with a chaperone present.   Constitutional:       General: She is awake.      Appearance: Normal appearance. She is not ill-appearing, toxic-appearing or diaphoretic.   HENT:      Head: Normocephalic and atraumatic.      Nose: Nose normal.      Mouth/Throat:      Mouth: Mucous membranes are moist.      Pharynx: Oropharynx is clear.   Eyes:      Conjunctiva/sclera: Conjunctivae normal.      Pupils: Pupils are equal, round, and reactive to light.   Cardiovascular:      Rate and Rhythm: Normal rate.   Pulmonary:      " Effort: Pulmonary effort is normal. No respiratory distress.   Abdominal:      Palpations: Abdomen is soft.      Tenderness: There is no guarding.   Musculoskeletal:         General: Normal range of motion.      Cervical back: Normal range of motion and neck supple. Tenderness present. No rigidity.      Thoracic back: Tenderness present.      Lumbar back: Tenderness present.      Right knee: Tenderness present.      Left knee: Tenderness present.   Skin:     General: Skin is warm and dry.      Coloration: Skin is not jaundiced or pale.   Neurological:      General: No focal deficit present.      Mental Status: She is alert and oriented to person, place, and time. Mental status is at baseline.      Cranial Nerves: No cranial nerve deficit (II-XII).   Psychiatric:         Mood and Affect: Mood normal.         Behavior: Behavior normal. Behavior is cooperative.         Thought Content: Thought content normal.           Mammo Digital Screening Bilat  Narrative: Result:  Mammo Digital Screening Bilat    History:  Patient is 57 y.o. and is seen for a screening mammogram.  Positive family history of breast cancer - mother , diagnosed in her 70's     Films Compared: 05/20/2016 through 05/24/2022     Findings:  The breasts are heterogeneously dense, which may obscure small masses.   There is asymmetry with prominent fibrous tissues present in the 12   o'clock position of the left breast. No suspicious masses or   microcalcifications are seen. No interval changes have occurred.     A breast examination was performed, and no clinically suspicious palpable   masses were present.   Impression:    No radiographic evidence of malignancy. Routine screening mammograms are   recommended.    BI-RADS Category 1: Negative    Recommendation:  Routine screening mammogram in 1 year is recommended.    Your estimated lifetime risk of breast cancer (to age 85) based on   Tyrer-Cuzick risk assessment model is 12.02 %.  According to the  American   Cancer Society, patients with a lifetime breast cancer risk of 20% or   higher might benefit from supplemental screening tests. ??          Office Visit on 01/16/2024   Component Date Value Ref Range Status    POC Amphetamines 01/16/2024 Negative  Negative, Inconclusive Final    POC Barbiturates 01/16/2024 Negative  Negative, Inconclusive Final    POC Benzodiazepines 01/16/2024 Negative  Negative, Inconclusive Final    POC Cocaine 01/16/2024 Negative  Negative, Inconclusive Final    POC THC 01/16/2024 Negative  Negative, Inconclusive Final    POC Methadone 01/16/2024 Negative  Negative, Inconclusive Final    POC Methamphetamine 01/16/2024 Negative  Negative, Inconclusive Final    POC Opiates 01/16/2024 Negative  Negative, Inconclusive Final    POC Oxycodone 01/16/2024 Presumptive Positive (A)  Negative, Inconclusive Final    POC Phencyclidine 01/16/2024 Negative  Negative, Inconclusive Final    POC Methylenedioxymethamphetamine * 01/16/2024 Negative  Negative, Inconclusive Final    POC Tricyclic Antidepressants 01/16/2024 Negative  Negative, Inconclusive Final    POC Buprenorphine 01/16/2024 Negative   Final     Acceptable 01/16/2024 Yes   Final    POC Temperature (Urine) 01/16/2024 92   Final         Orders Placed This Encounter   Procedures    POCT Urine Drug Screen Presump     Interpretive Information:     Negative:  No drug detected at the cut off level.   Positive:  This result represents presumptive positive for the   tested drug, other substances may yield a positive response other   than the analyte of interest. This result should be utilized for   diagnostic purpose only. Confirmation testing will be performed upon physician request only.            Requested Prescriptions     Signed Prescriptions Disp Refills    gabapentin (NEURONTIN) 400 MG capsule 60 capsule 1     Sig: Take 1 capsule (400 mg total) by mouth every 12 (twelve) hours.    oxyCODONE-acetaminophen (PERCOCET)  mg  per tablet 90 tablet 0     Sig: Take 1 tablet by mouth every 8 (eight) hours as needed for Pain.    oxyCODONE-acetaminophen (PERCOCET)  mg per tablet 90 tablet 0     Sig: Take 1 tablet by mouth every 8 (eight) hours as needed for Pain.       Assessment:     1. Lumbar radiculopathy, chronic    2. Chronic pain of right knee    3. Osteoarthrosis multiple sites, not specified as generalized    4. Chronic inflammatory arthritis    5. Encounter for long-term (current) use of other medications             A's of Opioid Risk Assessment  Activity:Patient can perform ADL.   Analgesia:Patients pain is partially controlled by current medication. Patient has tried OTC medications such as Tylenol and Ibuprofen with out relief.   Adverse Effects: Patient denies constipation or sedation.  Aberrant Behavior:  reviewed with no aberrant drug seeking/taking behavior.  Overdose reversal drug naloxone discussed    Drug screen reviewed        [facility] spine [facility] April 30, 2024  C3/4 1 mm retrolisthesis posterior disc herniation moderate spinal canal stenosis mild bilateral foraminal stenosis  C4/5 severe spinal canal stenosis severe right and mild left foraminal stenosis  C5/6 severe spinal canal stenosis moderate foraminal stenosis  C6/7 moderate spinal canal canal stenosis mild foraminal stenosis bilateral    MRI lumbar spine [facility] April 30, 2024  Loss of the normal lordotic curvature  Multiple level degenerative changes  L3/4 left paracentral neuroforaminal narrowing due to disc herniation  Mild left foraminal stenosis  L4/5 mild bilateral foraminal stenosis  L5/S1 no spinal canal or foraminal stenosis    Plan:     [place]    Narcan January 2023    Follows rheumatology [facility] inflammatory arthritis    Count correct  July 11, 2024  May 14, 2024    She had bilateral lumbar L4/5 TFESI # 2 March 22, 2022  She states she had 80% relief after  procedure she states that procedure did help increase her level function she has had more than 3 months pain relief following her procedure    Chronic joint pain back pain related to her rheumatoid arthritis osteoarthritis     She is considering repeating lumbar procedure    After discussing options she would like to continue with conservative management     She declines surgical evaluation for cervical and lumbar radiculopathy    Continue current medication    Continue home exercise program as directed    Follow-up 2 months    Dr. Soto, July 2024    Bring original prescription medication bottles/container/box with labels to each visit

## 2024-07-11 ENCOUNTER — OFFICE VISIT (OUTPATIENT)
Dept: PAIN MEDICINE | Facility: CLINIC | Age: 59
End: 2024-07-11
Payer: MEDICARE

## 2024-07-11 VITALS
WEIGHT: 201 LBS | HEART RATE: 77 BPM | SYSTOLIC BLOOD PRESSURE: 129 MMHG | HEIGHT: 67 IN | BODY MASS INDEX: 31.55 KG/M2 | RESPIRATION RATE: 18 BRPM | DIASTOLIC BLOOD PRESSURE: 78 MMHG

## 2024-07-11 DIAGNOSIS — M19.90 CHRONIC INFLAMMATORY ARTHRITIS: ICD-10-CM

## 2024-07-11 DIAGNOSIS — M54.16 LUMBAR RADICULOPATHY, CHRONIC: Primary | Chronic | ICD-10-CM

## 2024-07-11 DIAGNOSIS — G89.29 CHRONIC PAIN OF RIGHT KNEE: Chronic | ICD-10-CM

## 2024-07-11 DIAGNOSIS — Z79.899 ENCOUNTER FOR LONG-TERM (CURRENT) USE OF OTHER MEDICATIONS: ICD-10-CM

## 2024-07-11 DIAGNOSIS — M25.561 CHRONIC PAIN OF RIGHT KNEE: Chronic | ICD-10-CM

## 2024-07-11 DIAGNOSIS — M89.49 OSTEOARTHROSIS MULTIPLE SITES, NOT SPECIFIED AS GENERALIZED: Chronic | ICD-10-CM

## 2024-07-11 PROCEDURE — 3078F DIAST BP <80 MM HG: CPT | Mod: CPTII,,, | Performed by: PHYSICIAN ASSISTANT

## 2024-07-11 PROCEDURE — 80305 DRUG TEST PRSMV DIR OPT OBS: CPT | Mod: PBBFAC | Performed by: PHYSICIAN ASSISTANT

## 2024-07-11 PROCEDURE — 4010F ACE/ARB THERAPY RXD/TAKEN: CPT | Mod: CPTII,,, | Performed by: PHYSICIAN ASSISTANT

## 2024-07-11 PROCEDURE — 3008F BODY MASS INDEX DOCD: CPT | Mod: CPTII,,, | Performed by: PHYSICIAN ASSISTANT

## 2024-07-11 PROCEDURE — 3074F SYST BP LT 130 MM HG: CPT | Mod: CPTII,,, | Performed by: PHYSICIAN ASSISTANT

## 2024-07-11 PROCEDURE — 99214 OFFICE O/P EST MOD 30 MIN: CPT | Mod: S$PBB,,, | Performed by: PHYSICIAN ASSISTANT

## 2024-07-11 PROCEDURE — 99999PBSHW POCT URINE DRUG SCREEN PRESUMP: Mod: PBBFAC,,,

## 2024-07-11 PROCEDURE — 99215 OFFICE O/P EST HI 40 MIN: CPT | Mod: PBBFAC | Performed by: PHYSICIAN ASSISTANT

## 2024-07-11 PROCEDURE — 1159F MED LIST DOCD IN RCRD: CPT | Mod: CPTII,,, | Performed by: PHYSICIAN ASSISTANT

## 2024-07-11 PROCEDURE — 99999 PR PBB SHADOW E&M-EST. PATIENT-LVL V: CPT | Mod: PBBFAC,,, | Performed by: PHYSICIAN ASSISTANT

## 2024-07-11 RX ORDER — GABAPENTIN 400 MG/1
400 CAPSULE ORAL EVERY 12 HOURS
Qty: 60 CAPSULE | Refills: 1 | Status: SHIPPED | OUTPATIENT
Start: 2024-07-11

## 2024-07-11 RX ORDER — OXYCODONE AND ACETAMINOPHEN 10; 325 MG/1; MG/1
1 TABLET ORAL EVERY 8 HOURS PRN
Qty: 90 TABLET | Refills: 0 | Status: SHIPPED | OUTPATIENT
Start: 2024-07-15 | End: 2024-08-14

## 2024-07-11 RX ORDER — OXYCODONE AND ACETAMINOPHEN 10; 325 MG/1; MG/1
1 TABLET ORAL EVERY 8 HOURS PRN
Qty: 90 TABLET | Refills: 0 | Status: SHIPPED | OUTPATIENT
Start: 2024-08-14 | End: 2024-09-13

## 2024-08-27 PROBLEM — M19.90 INFLAMMATORY ARTHRITIS: Chronic | Status: ACTIVE | Noted: 2024-08-27

## 2024-08-27 NOTE — PROGRESS NOTES
Subjective:         Patient ID: Audrey Causey is a 59 y.o. female.    Chief Complaint: Low-back Pain, Leg Pain, Neck Pain, and Wrist Pain (////////////////////////////////////////////)        Pain  This is a chronic problem. The current episode started more than 1 year ago. The problem occurs daily. The problem has been unchanged. Associated symptoms include arthralgias and neck pain. Pertinent negatives include no anorexia, chest pain, chills, coughing, diaphoresis, fever, sore throat, vertigo or vomiting.     Review of Systems   Constitutional:  Negative for activity change, appetite change, chills, diaphoresis, fever and unexpected weight change.   HENT:  Negative for drooling, ear discharge, ear pain, facial swelling, nosebleeds, sore throat, trouble swallowing, voice change and goiter.    Eyes:  Negative for photophobia, pain, discharge, redness and visual disturbance.   Respiratory:  Negative for apnea, cough, choking, chest tightness, shortness of breath, wheezing and stridor.    Cardiovascular:  Negative for chest pain, palpitations and leg swelling.   Gastrointestinal:  Negative for abdominal distention, anorexia, diarrhea, rectal pain, vomiting and fecal incontinence.   Endocrine: Negative for cold intolerance, heat intolerance, polydipsia, polyphagia and polyuria.   Genitourinary:  Negative for bladder incontinence, dysuria, flank pain, frequency and hot flashes.   Musculoskeletal:  Positive for arthralgias, back pain, leg pain, neck pain and neck stiffness.   Integumentary:  Negative for color change and pallor.   Allergic/Immunologic: Negative for immunocompromised state.   Neurological:  Negative for dizziness, vertigo, seizures, syncope, facial asymmetry, speech difficulty, light-headedness, memory loss and coordination difficulties.   Hematological:  Negative for adenopathy. Does not bruise/bleed easily.   Psychiatric/Behavioral:  Negative for agitation, behavioral problems, confusion, decreased  concentration, dysphoric mood, hallucinations, self-injury and suicidal ideas. The patient is not nervous/anxious and is not hyperactive.            Past Medical History:   Diagnosis Date    Acute superficial gastritis without hemorrhage 2021    Arthritis     Diabetes mellitus     Diverticula, colon 2021    Esophageal dysphagia 2021    GERD (gastroesophageal reflux disease)     Hypertension     Low back pain     Lumbar radiculopathy     Seizures     Sleep apnea     Unspecified glaucoma      Past Surgical History:   Procedure Laterality Date     SECTION      COLONOSCOPY  2018    Dr. Naranjo    EPIDURAL STEROID INJECTION  2018    L4-5 MARCOS X 5 - Cook    EPIDURAL STEROID INJECTION  2017    L5-S1 MARCOS - Cook    EPIDURAL STEROID INJECTION N/A 2022    Procedure: Injection, Steroid, Epidural, L4/5;  Surgeon: Yari Soto MD;  Location: [facility];  Service: Pain Management;  Laterality: N/A;  MESSAGE LEFT ON VM TO BE TESTED ON 1-14    ESOPHAGOGASTRODUODENOSCOPY  2018    EYE SURGERY      EYE SURGERY Left     shank    GALLBLADDER SURGERY      HYSTERECTOMY      INJECTION OF FACET JOINT Bilateral 2017    Bilateral L3-S1 Facet Injection - Cook    KELOID EXCISION Bilateral 2023    Procedure: EXCISION, KELOID ON BILATERAL EARLOBES;  Surgeon: Moreno Xiong MD;  Location: [facility] OR;  Service: ENT;  Laterality: Bilateral;    LAPAROSCOPIC CHOLECYSTECTOMY N/A 2021    Procedure: CHOLECYSTECTOMY, LAPAROSCOPIC;  Surgeon: Amy Wagner MD;  Location: [facility] OR;  Service: General;  Laterality: N/A;  fully vaccinated    OOPHORECTOMY      TRANSFORAMINAL EPIDURAL INJECTION OF STEROID Bilateral 2022    Procedure: Injection,steroid,epidural,transforaminal approach, L4/5;  Surgeon: Yari Soto MD;  Location: [facility];  Service: Pain Management;  Laterality: Bilateral;  pt aware on ov to be tested     Social History  "    Socioeconomic History    Marital status:    Tobacco Use    Smoking status: Never     Passive exposure: Never    Smokeless tobacco: Never   Substance and Sexual Activity    Alcohol use: Never    Drug use: Yes     Types: Oxycodone     Social Determinants of Health     Financial Resource Strain: Not on File (2022)    Received from [facility]    Financial Resource Strain     Financial Resource Strain: 0   Transportation Needs: Not on File (2022)    [facility] from [facility]    Transportation Needs     Transportation: 0   Physical Activity: Not on File (2022)    Received from [facility]    Physical Activity     Physical Activity: 0   Stress: Not on File (2022)    Received from [facility]    Stress     Stress: 0   Housing Stability: Not on File (2022)    Received from [facility]    Housing Stability     Housin     Family History   Problem Relation Name Age of Onset    Hypertension Mother      Breast cancer Mother      Thyroid cancer Mother      Diabetes type II Mother      Stroke Mother      Heart attack Mother       Review of patient's allergies indicates:  No Known Allergies     Objective:  Vitals:    24 0824   BP: (!) 146/72   Pulse: 76   Resp: 18   Weight: 93.9 kg (207 lb)   Height: 5' 7" (1.702 m)   PainSc:   8                 Physical Exam  Vitals and nursing note reviewed. Exam conducted with a chaperone present.   Constitutional:       General: She is awake.      Appearance: Normal appearance. She is not ill-appearing, toxic-appearing or diaphoretic.   HENT:      Head: Normocephalic and atraumatic.      Nose: Nose normal.      Mouth/Throat:      Mouth: Mucous membranes are moist.      Pharynx: Oropharynx is clear.   Eyes:      Conjunctiva/sclera: Conjunctivae normal.      Pupils: Pupils are equal, round, and reactive to light.   Cardiovascular:      Rate and Rhythm: Normal rate.   Pulmonary:      Effort: Pulmonary effort is normal. No respiratory distress. "   Abdominal:      Palpations: Abdomen is soft.      Tenderness: There is no guarding.   Musculoskeletal:         General: Normal range of motion.      Cervical back: Normal range of motion and neck supple. Tenderness present. No rigidity.      Thoracic back: Tenderness present.      Lumbar back: Tenderness present.      Right knee: Tenderness present.      Left knee: Tenderness present.   Skin:     General: Skin is warm and dry.      Coloration: Skin is not jaundiced or pale.   Neurological:      General: No focal deficit present.      Mental Status: She is alert and oriented to person, place, and time. Mental status is at baseline.      Cranial Nerves: No cranial nerve deficit (II-XII).   Psychiatric:         Mood and Affect: Mood normal.         Behavior: Behavior normal. Behavior is cooperative.         Thought Content: Thought content normal.           Mammo Digital Screening Bilat w/ [facility]  Narrative: Facility:  [address] [id number]  [phone]    Name: Audrey Causey    MRN: [id number]    Result:  Mammo Digital Screening Bilat w/ [facility]    History:  Patient is 59 y.o. and is seen for a screening mammogram.   Positive family history of breast cancer - mother, diagnosed in her 70's     Films Compared:  05/20/2016 through 07/12/2023     Findings:  This procedure was performed using tomosynthesis.   Computer-aided detection was utilized in the interpretation of this   examination.    No suspicious masses, new areas of distortion or microcalcifications are   seen. No interval changes have occurred.     A breast examination was performed, and no clinically suspicious palpable   masses were present.     Breast Density:  The breasts are heterogeneously dense, which may obscure small masses.   Impression:    No mammographic evidence of malignancy.  No radiographic evidence of malignancy. Routine screening mammograms are   recommended.    BI-RADS Category 1:  Negative    Recommendation:  Routine screening mammogram in 1 year is recommended.    Your estimated lifetime risk of breast cancer (to age 85) based on   Tyrer-Cuzick risk assessment model is 14.97%.  According to the American   Cancer Society, patients with a lifetime breast cancer risk of 20% or   higher might benefit from supplemental screening tests, such as screening   breast MRI.    Audrey Corrigan MD    Cc: Dr. Dwayne Christie         [place], [place]          Office Visit on 07/11/2024   Component Date Value Ref Range Status    POC Amphetamines 07/11/2024 Negative  Negative, Inconclusive Final    POC Barbiturates 07/11/2024 Negative  Negative, Inconclusive Final    POC Benzodiazepines 07/11/2024 Negative  Negative, Inconclusive Final    POC Cocaine 07/11/2024 Negative  Negative, Inconclusive Final    POC THC 07/11/2024 Negative  Negative, Inconclusive Final    POC Methadone 07/11/2024 Negative  Negative, Inconclusive Final    POC Methamphetamine 07/11/2024 Negative  Negative, Inconclusive Final    POC Opiates 07/11/2024 Negative  Negative, Inconclusive Final    POC Oxycodone 07/11/2024 Presumptive Positive (A)  Negative, Inconclusive Final    POC Phencyclidine 07/11/2024 Negative  Negative, Inconclusive Final    POC Methylenedioxymethamphetamine * 07/11/2024 Negative  Negative, Inconclusive Final    POC Tricyclic Antidepressants 07/11/2024 Negative  Negative, Inconclusive Final    POC Buprenorphine 07/11/2024 Negative   Final     Acceptable 07/11/2024 Yes   Final    POC Temperature (Urine) 07/11/2024 92   Final         No orders of the defined types were placed in this encounter.        Requested Prescriptions     Signed Prescriptions Disp Refills    oxyCODONE-acetaminophen (PERCOCET)  mg per tablet 90 tablet 0     Sig: Take 1 tablet by mouth every 8 (eight) hours as needed for Pain.    oxyCODONE-acetaminophen (PERCOCET)  mg per tablet 90 tablet 0     Sig: Take 1 tablet by mouth  every 8 (eight) hours as needed for Pain.       Assessment:     1. Chronic inflammatory arthritis    2. Chronic pain of right knee    3. Osteoarthrosis multiple sites, not specified as generalized    4. Lumbar radiculopathy, chronic               A's of Opioid Risk Assessment  Activity:Patient can perform ADL.   Analgesia:Patients pain is partially controlled by current medication. Patient has tried OTC medications such as Tylenol and Ibuprofen with out relief.   Adverse Effects: Patient denies constipation or sedation.  Aberrant Behavior:  reviewed with no aberrant drug seeking/taking behavior.  Overdose reversal drug naloxone discussed    Drug screen reviewed        MRI cervical spine advanced imaging center [facility] April 30, 2024  C3/4 1 mm retrolisthesis posterior disc herniation moderate spinal canal stenosis mild bilateral foraminal stenosis  C4/5 severe spinal canal stenosis severe right and mild left foraminal stenosis  C5/6 severe spinal canal stenosis moderate foraminal stenosis  C6/7 moderate spinal canal canal stenosis mild foraminal stenosis bilateral    MRI lumbar spine advanced imaging center [facility] April 30, 2024  Loss of the normal lordotic curvature  Multiple level degenerative changes  L3/4 left paracentral neuroforaminal narrowing due to disc herniation  Mild left foraminal stenosis  L4/5 mild bilateral foraminal stenosis  L5/S1 no spinal canal or foraminal stenosis    Plan:     Andrés Auan January 2023    Follows rheumatology inflammatory arthritis    She had bilateral lumbar L4/5 TFESI # 2 March 22, 2022  She states she had 80% relief after procedure she states that procedure did help increase her level function she has had more than 3 months pain relief following her procedure    Chronic joint pain back pain related to her rheumatoid arthritis osteoarthritis     She declines surgical evaluation for cervical and lumbar radiculopathy    Complaint multiple joint  pain upper lower extremities related to her underlying condition    She is requesting Toradol injection     Toradol 60 mg IM, tolerated well     She states she would like to continue with current medication conservative management this time    Continue current medication    Continue home exercise program as directed    Follow-up 2 months    Dr. Soto, July 2024    Bring original prescription medication bottles/container/box with labels to each visit

## 2024-09-04 ENCOUNTER — HOSPITAL ENCOUNTER (OUTPATIENT)
Dept: RADIOLOGY | Facility: HOSPITAL | Age: 59
Discharge: HOME | End: 2024-09-04
Attending: RADIOLOGY
Payer: MEDICARE

## 2024-09-04 DIAGNOSIS — Z12.31 VISIT FOR SCREENING MAMMOGRAM: ICD-10-CM

## 2024-09-04 PROCEDURE — 77063 BREAST TOMOSYNTHESIS BI: CPT | Mod: 26,,, | Performed by: RADIOLOGY

## 2024-09-04 PROCEDURE — 77067 SCR MAMMO BI INCL CAD: CPT | Mod: TC

## 2024-09-04 PROCEDURE — 77067 SCR MAMMO BI INCL CAD: CPT | Mod: 26,,, | Performed by: RADIOLOGY

## 2024-09-11 ENCOUNTER — OFFICE VISIT (OUTPATIENT)
Dept: PAIN MEDICINE | Facility: CLINIC | Age: 59
End: 2024-09-11
Payer: MEDICARE

## 2024-09-11 VITALS
RESPIRATION RATE: 18 BRPM | WEIGHT: 207 LBS | SYSTOLIC BLOOD PRESSURE: 146 MMHG | DIASTOLIC BLOOD PRESSURE: 72 MMHG | HEART RATE: 76 BPM | HEIGHT: 67 IN | BODY MASS INDEX: 32.49 KG/M2

## 2024-09-11 DIAGNOSIS — M54.16 LUMBAR RADICULOPATHY, CHRONIC: Chronic | ICD-10-CM

## 2024-09-11 DIAGNOSIS — M89.49 OSTEOARTHROSIS MULTIPLE SITES, NOT SPECIFIED AS GENERALIZED: Chronic | ICD-10-CM

## 2024-09-11 DIAGNOSIS — M19.90 CHRONIC INFLAMMATORY ARTHRITIS: Primary | Chronic | ICD-10-CM

## 2024-09-11 DIAGNOSIS — M25.561 CHRONIC PAIN OF RIGHT KNEE: Chronic | ICD-10-CM

## 2024-09-11 DIAGNOSIS — G89.29 CHRONIC PAIN OF RIGHT KNEE: Chronic | ICD-10-CM

## 2024-09-11 PROCEDURE — 99214 OFFICE O/P EST MOD 30 MIN: CPT | Mod: S$PBB,25,, | Performed by: PHYSICIAN ASSISTANT

## 2024-09-11 PROCEDURE — 3008F BODY MASS INDEX DOCD: CPT | Mod: CPTII,,, | Performed by: PHYSICIAN ASSISTANT

## 2024-09-11 PROCEDURE — 99999 PR PBB SHADOW E&M-EST. PATIENT-LVL V: CPT | Mod: PBBFAC,,, | Performed by: PHYSICIAN ASSISTANT

## 2024-09-11 PROCEDURE — 3078F DIAST BP <80 MM HG: CPT | Mod: CPTII,,, | Performed by: PHYSICIAN ASSISTANT

## 2024-09-11 PROCEDURE — 1159F MED LIST DOCD IN RCRD: CPT | Mod: CPTII,,, | Performed by: PHYSICIAN ASSISTANT

## 2024-09-11 PROCEDURE — 96372 THER/PROPH/DIAG INJ SC/IM: CPT | Mod: PBBFAC | Performed by: PHYSICIAN ASSISTANT

## 2024-09-11 PROCEDURE — 99999PBSHW PR PBB SHADOW TECHNICAL ONLY FILED TO HB: Mod: PBBFAC,,,

## 2024-09-11 PROCEDURE — 99215 OFFICE O/P EST HI 40 MIN: CPT | Mod: PBBFAC,25 | Performed by: PHYSICIAN ASSISTANT

## 2024-09-11 PROCEDURE — 4010F ACE/ARB THERAPY RXD/TAKEN: CPT | Mod: CPTII,,, | Performed by: PHYSICIAN ASSISTANT

## 2024-09-11 PROCEDURE — 3077F SYST BP >= 140 MM HG: CPT | Mod: CPTII,,, | Performed by: PHYSICIAN ASSISTANT

## 2024-09-11 RX ORDER — OXYCODONE AND ACETAMINOPHEN 10; 325 MG/1; MG/1
1 TABLET ORAL EVERY 8 HOURS PRN
Qty: 90 TABLET | Refills: 0 | Status: SHIPPED | OUTPATIENT
Start: 2024-09-13 | End: 2024-10-13

## 2024-09-11 RX ORDER — KETOROLAC TROMETHAMINE 30 MG/ML
60 INJECTION, SOLUTION INTRAMUSCULAR; INTRAVENOUS
Status: COMPLETED | OUTPATIENT
Start: 2024-09-11 | End: 2024-09-11

## 2024-09-11 RX ORDER — OXYCODONE AND ACETAMINOPHEN 10; 325 MG/1; MG/1
1 TABLET ORAL EVERY 8 HOURS PRN
Qty: 90 TABLET | Refills: 0 | Status: SHIPPED | OUTPATIENT
Start: 2024-10-12 | End: 2024-11-11

## 2024-09-11 RX ADMIN — KETOROLAC TROMETHAMINE 60 MG: 30 INJECTION, SOLUTION INTRAMUSCULAR at 08:09

## 2024-11-11 ENCOUNTER — OFFICE VISIT (OUTPATIENT)
Dept: PAIN MEDICINE | Facility: CLINIC | Age: 59
End: 2024-11-11
Payer: MEDICARE

## 2024-11-11 VITALS
DIASTOLIC BLOOD PRESSURE: 78 MMHG | HEIGHT: 67 IN | SYSTOLIC BLOOD PRESSURE: 151 MMHG | WEIGHT: 204 LBS | RESPIRATION RATE: 18 BRPM | HEART RATE: 72 BPM | BODY MASS INDEX: 32.02 KG/M2

## 2024-11-11 DIAGNOSIS — M54.17 LUMBOSACRAL RADICULOPATHY: ICD-10-CM

## 2024-11-11 DIAGNOSIS — M79.18 MYALGIA, OTHER SITE: ICD-10-CM

## 2024-11-11 DIAGNOSIS — Z79.899 ENCOUNTER FOR LONG-TERM (CURRENT) USE OF MEDICATIONS: Primary | ICD-10-CM

## 2024-11-11 DIAGNOSIS — M79.10 MYALGIA: ICD-10-CM

## 2024-11-11 PROCEDURE — 1159F MED LIST DOCD IN RCRD: CPT | Mod: CPTII,,, | Performed by: PAIN MEDICINE

## 2024-11-11 PROCEDURE — 99999PBSHW POCT URINE DRUG SCREEN PRESUMP: Mod: PBBFAC,,,

## 2024-11-11 PROCEDURE — 4010F ACE/ARB THERAPY RXD/TAKEN: CPT | Mod: CPTII,,, | Performed by: PAIN MEDICINE

## 2024-11-11 PROCEDURE — 96372 THER/PROPH/DIAG INJ SC/IM: CPT | Mod: PBBFAC | Performed by: PAIN MEDICINE

## 2024-11-11 PROCEDURE — 99214 OFFICE O/P EST MOD 30 MIN: CPT | Mod: S$PBB,25,, | Performed by: PAIN MEDICINE

## 2024-11-11 PROCEDURE — 99215 OFFICE O/P EST HI 40 MIN: CPT | Mod: PBBFAC | Performed by: PAIN MEDICINE

## 2024-11-11 PROCEDURE — 99999PBSHW PR PBB SHADOW TECHNICAL ONLY FILED TO HB: Mod: PBBFAC,,,

## 2024-11-11 PROCEDURE — 3077F SYST BP >= 140 MM HG: CPT | Mod: CPTII,,, | Performed by: PAIN MEDICINE

## 2024-11-11 PROCEDURE — 3008F BODY MASS INDEX DOCD: CPT | Mod: CPTII,,, | Performed by: PAIN MEDICINE

## 2024-11-11 PROCEDURE — 80305 DRUG TEST PRSMV DIR OPT OBS: CPT | Mod: PBBFAC | Performed by: PAIN MEDICINE

## 2024-11-11 PROCEDURE — 99999 PR PBB SHADOW E&M-EST. PATIENT-LVL V: CPT | Mod: PBBFAC,,, | Performed by: PAIN MEDICINE

## 2024-11-11 PROCEDURE — 3078F DIAST BP <80 MM HG: CPT | Mod: CPTII,,, | Performed by: PAIN MEDICINE

## 2024-11-11 RX ORDER — OXYCODONE AND ACETAMINOPHEN 10; 325 MG/1; MG/1
1 TABLET ORAL EVERY 8 HOURS PRN
Qty: 90 TABLET | Refills: 0 | Status: SHIPPED | OUTPATIENT
Start: 2024-11-11 | End: 2024-12-11

## 2024-11-11 RX ORDER — KETOROLAC TROMETHAMINE 30 MG/ML
60 INJECTION, SOLUTION INTRAMUSCULAR; INTRAVENOUS
Status: COMPLETED | OUTPATIENT
Start: 2024-11-11 | End: 2024-11-11

## 2024-11-11 RX ORDER — NAPROXEN 500 MG/1
500 TABLET ORAL 2 TIMES DAILY
Qty: 60 TABLET | Refills: 1 | Status: SHIPPED | OUTPATIENT
Start: 2024-11-11

## 2024-11-11 RX ADMIN — KETOROLAC TROMETHAMINE 60 MG: 30 INJECTION, SOLUTION INTRAMUSCULAR at 12:11

## 2024-11-11 NOTE — H&P (VIEW-ONLY)
She Disclaimer: This note has been generated using voice-recognition software. There may be typographical errors that have been missed during proof-reading        Patient ID: Audrey Causye is a 59 y.o. female.      Chief Complaint: Low-back Pain and Wrist Pain (bilateral)      59-year-old female returns for re-evaluation of severe lower back pain with radicular symptoms to the bilateral lower extremities.  The pain has recently escalated and she notes difficulty with standing, walking and most activities.  She describes paresthesia and weakness of the lower extremities, left greater than right.  Her current medications are providing inadequate pain relief.  MRI of the lumbar spine from October 5, 2022 results were reviewed and discussed with patient.  She received an epidural steroid injection March 2022 with some improvement and returns today to discuss  repeating injection for intractable lower back pain and radiculopathy.  She defers surgical consultation.                        Pain Assessment  Pain Assessment: 0-10  Pain Score:   9  Pain Location: Back  Pain Orientation: Left, Right  Pain Radiating Towards: wrist  Pain Descriptors: Aching, Burning, Sharp, Dull  Pain Frequency: Constant/continuous  Pain Onset: Awakened from sleep  Clinical Progression: Gradually worsening  Aggravating Factors: Other (Comment) (just there)  Pain Intervention(s): Medication (See eMAR), Home medication      A's of Opioid Risk Assessment  Activity:Patient can  perform ADL.   Analgesia:Patients pain is not controlled by current medication.   Adverse Effects: Patient denies constipation or sedation.  Aberrant Behavior:  reviewed with no aberrant drug seeking/taking behavior.      Patient denies any suicidal or homicidal ideations    Physical Therapy/Home Exercise: yes          Review of Systems   Constitutional: Negative.    HENT: Negative.     Eyes: Negative.    Respiratory: Negative.     Cardiovascular: Negative.     Gastrointestinal: Negative.    Endocrine: Negative.    Genitourinary: Negative.    Musculoskeletal:  Positive for arthralgias, back pain and leg pain (Bilateral lower extremities).   Integumentary:  Negative.   Neurological:  Positive for numbness (Bilateral lower extremities).   Hematological: Negative.    Psychiatric/Behavioral: Negative.               Past Medical History:   Diagnosis Date    Acute superficial gastritis without hemorrhage 2021    Arthritis     Diabetes mellitus     Diverticula, colon 2021    Esophageal dysphagia 2021    GERD (gastroesophageal reflux disease)     Hypertension     Low back pain     Lumbar radiculopathy     Seizures     Sleep apnea     Unspecified glaucoma      Past Surgical History:   Procedure Laterality Date     SECTION      COLONOSCOPY  2018    Dr. Naranjo    EPIDURAL STEROID INJECTION  2018    L4-5 MARCOS X 5 - Cook    EPIDURAL STEROID INJECTION  2017    L5-S1 MARCOS - Cook    EPIDURAL STEROID INJECTION N/A 2022    Procedure: Injection, Steroid, Epidural, L4/5;  Surgeon: Yari Soto MD;  Location: [facility];  Service: Pain Management;  Laterality: N/A;  MESSAGE LEFT ON VM TO BE TESTED ON 1-14    ESOPHAGOGASTRODUODENOSCOPY  2018    EYE SURGERY      EYE SURGERY Left     shank    GALLBLADDER SURGERY      HYSTERECTOMY      INJECTION OF FACET JOINT Bilateral 2017    Bilateral L3-S1 Facet Injection - Cook    KELOID EXCISION Bilateral 2023    Procedure: EXCISION, KELOID ON BILATERAL EARLOBES;  Surgeon: Moreno Xiong MD;  Location: [facility] OR;  Service: ENT;  Laterality: Bilateral;    LAPAROSCOPIC CHOLECYSTECTOMY N/A 2021    Procedure: CHOLECYSTECTOMY, LAPAROSCOPIC;  Surgeon: Amy Wagner MD;  Location: [facility] OR;  Service: General;  Laterality: N/A;  fully vaccinated    OOPHORECTOMY      TRANSFORAMINAL EPIDURAL INJECTION OF STEROID Bilateral 2022    Procedure:  Injection,steroid,epidural,transforaminal approach, L4/5;  Surgeon: Yari Soto MD;  Location: [facility];  Service: Pain Management;  Laterality: Bilateral;  pt aware on ov to be tested     Social History     Socioeconomic History    Marital status:    Tobacco Use    Smoking status: Never     Passive exposure: Never    Smokeless tobacco: Never   Substance and Sexual Activity    Alcohol use: Never    Drug use: Yes     Types: Oxycodone     Social Drivers of Health     Financial Resource Strain: Not on File (2022)    Received from light    Financial Resource Strain     Financial Resource Strain: 0   Food Insecurity: Not on File (2024)    Received from [facility]    Food Insecurity     Food: 0   Transportation Needs: Not on File (2022)    Received from light    Transportation Needs     Transportation: 0   Physical Activity: Not on File (2022)    Received from light    Physical Activity     Physical Activity: 0   Stress: Not on File (2022)    Received from light    Stress     Stress: 0   Housing Stability: Not on File (2022)    Received from light    Housing Stability     Housin     Family History   Problem Relation Name Age of Onset    Hypertension Mother      Breast cancer Mother      Thyroid cancer Mother      Diabetes type II Mother      Stroke Mother      Heart attack Mother       Review of patient's allergies indicates:  No Known Allergies  has a current medication list which includes the following prescription(s): amlodipine, aspirin, blood pressure monitor, carvedilol, cholecalciferol (vitamin d3), cyanocobalamin, dorzolamide-timolol 2-0.5%, empagliflozin, gabapentin, hydralazine, lisinopril-hydrochlorothiazide, meloxicam, metformin, rhopressa, omeprazole, oxycodone-acetaminophen, potassium chloride sa, rhopressa, sitagliptin phosphate, tafluprost (pf), travoprost, buspirone, citalopram, estradiol, glipizide, metoclopramide hcl,  metronidazole, naproxen, oxycodone-acetaminophen, and quetiapine, and the following Facility-Administered Medications: ketorolac.      Objective:  Vitals:    11/11/24 1154   BP: (!) 151/78   Pulse: 72   Resp: 18        Physical Exam  Vitals and nursing note (Accompanied by her ) reviewed. Chaperone present: Accompanied by her .   Constitutional:       General: She is not in acute distress.     Appearance: Normal appearance. She is not ill-appearing, toxic-appearing or diaphoretic.   HENT:      Head: Normocephalic and atraumatic.      Nose: Nose normal.      Mouth/Throat:      Mouth: Mucous membranes are moist.   Eyes:      Extraocular Movements: Extraocular movements intact.      Pupils: Pupils are equal, round, and reactive to light.   Cardiovascular:      Rate and Rhythm: Normal rate and regular rhythm.      Heart sounds: Normal heart sounds.   Pulmonary:      Effort: Pulmonary effort is normal. No respiratory distress.      Breath sounds: Normal breath sounds. No stridor. No wheezing or rhonchi.   Abdominal:      General: Bowel sounds are normal.      Palpations: Abdomen is soft.   Musculoskeletal:         General: No swelling or deformity.      Cervical back: Normal and normal range of motion. No spasms or tenderness. No pain with movement. Normal range of motion.      Thoracic back: Normal.      Lumbar back: Tenderness and bony tenderness present. No spasms. Decreased range of motion. Negative right straight leg raise test and negative left straight leg raise test. No scoliosis.      Right lower leg: No edema.      Left lower leg: No edema.      Comments: Lumbar pain with flexion, extension lateral rotation.  Bilateral spinous and paraspinous process tenderness to palpation   Skin:     General: Skin is warm.   Neurological:      General: No focal deficit present.      Mental Status: She is alert and oriented to person, place, and time. Mental status is at baseline.      Cranial Nerves: No cranial  nerve deficit.      Sensory: Sensation is intact. No sensory deficit.      Motor: No weakness.      Coordination: Coordination normal.      Gait: Gait abnormal.      Deep Tendon Reflexes:      Reflex Scores:       Patellar reflexes are 1+ on the right side and 1+ on the left side.       Achilles reflexes are 1+ on the right side and 1+ on the left side.  Psychiatric:         Mood and Affect: Mood normal.         Behavior: Behavior normal.           Assessment:      1. Encounter for long-term (current) use of medications    2. Myalgia    3. Myalgia, other site    4. Lumbosacral radiculopathy          Plan:  1. reviewed  2.Addiction, Dependency, Tolerance, Opioid abuse-misuse, Death, Diversion Discussed. Overdose reversal drug Naloxone discussed. Patient is prescribed opiates for chronic nonmalignant pain pathology.  Patient is receiving opiates which require greater than a 72 hour supply of therapy.  Patient was educated on potential dependency associated with long-term opioid use as well as decreasing efficacy with prolonged use.  Patient was advised of risks, benefits and side effects and how to utilize each medication.  Patient was also informed that any deviation from therapy protocol will  lead to discontinuation of opiates.  It is reasonable to prescribe opioid analgesics for patient based on positive response to opioid medications, lack of side effects and  limited aberrant behavior.    3.Refill/Continue medications for pain control and function       Requested Prescriptions     Signed Prescriptions Disp Refills    naproxen (NAPROSYN) 500 MG tablet 60 tablet 1     Sig: Take 1 tablet (500 mg total) by mouth 2 (two) times daily.    oxyCODONE-acetaminophen (PERCOCET)  mg per tablet 90 tablet 0     Sig: Take 1 tablet by mouth every 8 (eight) hours as needed for Pain.     4. Schedule L5-S1 epidural steroid injection epidurogram under fluoroscopy for lumbosacral radiculopathy    5.Indications for this  procedure for this specific patient include the following   -History, physical examination and concordant radiological image based diagnostic testing supports medical necessity for an epidural steroid injection  - neurogenic claudication due to central disc pathology, osteophyte complexes, severe degenerative disc disease producing foraminal or central stenosis  - repeat epidural steroid injection is medically necessary.  The 1st injection significantly provided improvement of at least 80 % sustained improvement in pain relief, improvement in function from baseline for least 3 months.  - Pt has been in pain for at least 6 weeks and has failed conservative care (e.g. Exercise, physical methods, NSAID/ and or muscle relaxants)   - Pt has no major risk factors for spinal cancer or contraindicated condition   - Neurogenic claudication and Radicular pain are interfering with functional activity  - Pain is associated with symptoms of nerve root irritation   - Any numbness documented is accompanied with paresthesia   - No evidence of systemic or local infection, bleeding tendency or unstable medical condition   - Epidural provided as part of a comprehensive pain management program  - All repeat injections have at least 80% pain relief and increase functional gain and physical activity, and reduction in reliance on the use of medication and or physical therapy  - Pt has significant functional limitation resulting in diminished quality of life and impaired age appropriate ADL's.   - Diagnostic evaluation has ruled out other causes of pain  - Pt participating in an active rehabilitation program or home exercise program which has been discussed with the patient including heat ice and rest  - No more than 3 epidurals will be done in a 6 month period at the same level with at least 7 days between injections  - MAC is only offered in cases of needle phobia   - Injection done at L5-S1 level which is consistent with patient's  dermatomal pain complaint    6.Monitored Anesthesia Care medical necessity authorization request:    Monitor anesthesia request is medically indicated for the scheduled nerve block procedure due to:  - needle phobia and anxiety, placing  the patient at risk during the provided service.  -patient has an ASA class greater than 3 and requires constant presence of an anesthesiologist during the procedure:  -patient has severe problems with muscles and muscle spasticity that makes it hard to lie still  -patient suffers from chronic pain and is unable to function due to  diminished ADLs    7.The planned medically necessary  surgical procedure is performed in a hospital outpatient department and not in an ambulatory surgical center due to:     -there is no geographically assessable ambulatory surgery center that has the  necessary equipment and fluoroscopy needed for the procedure     -there is no geographically assessable ambulatory surgical center available at which the physician has privileges     -an ASC's  specific  guideline regarding the individuals weight or health conditions that prevent the use of an ASC       -injections must be performed under fluoroscopy image guidance with contrast unless the patient has a documented contrast allergy or pregnancy     8.Follow with CORINA Rodriguez in 1 month for re-evaluation and medication refill       report:  Reviewed and consistent with medication use as prescribed.      The total time spent for evaluation and management on 11/11/2024 including reviewing separately obtained history, performing a medically appropriate exam and evaluation, documenting clinical information in the health record, independently interpreting results and communicating them to the patient/family/caregiver, and ordering medications/tests/procedures was between 15-29 minutes.    The above plan and management options were discussed at length with patient. Patient is in agreement with the above and  verbalized understanding. It will be communicated with the referring physician via electronic record, fax, or mail.

## 2024-11-11 NOTE — PATIENT INSTRUCTIONS
YOU ARE SCHEDULED ON 12/05/2024 AT 8:15 AM FOR YOUR PROCEDURE- L5-S1 MARCOS WITH  .      STOP TAKING ASPIRIN FOR 7 DAYS BEFORE YOUR PROCEDURE.        STOP TAKING MOBIC 3 DAYS BEFORE YOUR PROCEDURE.        Procedure Instructions:    Nothing to eat or drink for 8 hours or after midnight including gum, candy, mints, or tobacco products.  If you are scheduled for 1:30 or later nothing to eat or drink after 5 a.m. the morning of the procedure, including gum, candy, mints, or tobacco products.  Must have a  at least 18 yrs of age to stay present at all times  No Diabetic medications the morning of procedure, check blood sugar the morning of procedure, if it is greater than 200 call the office at [phone]  If you are started on antibiotics or have been prescribed antibiotics, have a fever, or have any other type of infection call to reschedule procedure.  If you take blood pressure medications you can take it at your regular scheduled time with a small sip of WATER!  Dentures are to be removed prior to procedure or we can provide you with a denture cup to remove them prior to being taken back for procedure.   False eyelashes are to be removed before the morning of procedure.    Contacts will have to be removed prior to procedure.  No jewelry is to be worn to the procedure.     HOLD ASPIRIN AND ASPIRIN PRODUCTS  (ASPIRIN, BC POWDER ETC. ) FOR 7 DAYS  PRIOR TO PROCEDURE  HOLD NSAIDS( ibuprofen, mobic, meloxicam, advil, diclofenac, naproxen, relafen, celebrex,  methotrexate, aleve etc....)  FOR 3 DAYS   PRIOR TO PROCEDURE        SIGNATURE:______________________________________________________________________________________________

## 2024-11-11 NOTE — PROGRESS NOTES
She Disclaimer: This note has been generated using voice-recognition software. There may be typographical errors that have been missed during proof-reading        Patient ID: Audrey Causey is a 59 y.o. female.      Chief Complaint: Low-back Pain and Wrist Pain (bilateral)      59-year-old female returns for re-evaluation of severe lower back pain with radicular symptoms to the bilateral lower extremities.  The pain has recently escalated and she notes difficulty with standing, walking and most activities.  She describes paresthesia and weakness of the lower extremities, left greater than right.  Her current medications are providing inadequate pain relief.  MRI of the lumbar spine from October 5, 2022 results were reviewed and discussed with patient.  She received an epidural steroid injection March 2022 with some improvement and returns today to discuss  repeating injection for intractable lower back pain and radiculopathy.  She defers surgical consultation.                        Pain Assessment  Pain Assessment: 0-10  Pain Score:   9  Pain Location: Back  Pain Orientation: Left, Right  Pain Radiating Towards: wrist  Pain Descriptors: Aching, Burning, Sharp, Dull  Pain Frequency: Constant/continuous  Pain Onset: Awakened from sleep  Clinical Progression: Gradually worsening  Aggravating Factors: Other (Comment) (just there)  Pain Intervention(s): Medication (See eMAR), Home medication      A's of Opioid Risk Assessment  Activity:Patient can  perform ADL.   Analgesia:Patients pain is not controlled by current medication.   Adverse Effects: Patient denies constipation or sedation.  Aberrant Behavior:  reviewed with no aberrant drug seeking/taking behavior.      Patient denies any suicidal or homicidal ideations    Physical Therapy/Home Exercise: yes          Review of Systems   Constitutional: Negative.    HENT: Negative.     Eyes: Negative.    Respiratory: Negative.     Cardiovascular: Negative.     Gastrointestinal: Negative.    Endocrine: Negative.    Genitourinary: Negative.    Musculoskeletal:  Positive for arthralgias, back pain and leg pain (Bilateral lower extremities).   Integumentary:  Negative.   Neurological:  Positive for numbness (Bilateral lower extremities).   Hematological: Negative.    Psychiatric/Behavioral: Negative.               Past Medical History:   Diagnosis Date    Acute superficial gastritis without hemorrhage 2021    Arthritis     Diabetes mellitus     Diverticula, colon 2021    Esophageal dysphagia 2021    GERD (gastroesophageal reflux disease)     Hypertension     Low back pain     Lumbar radiculopathy     Seizures     Sleep apnea     Unspecified glaucoma      Past Surgical History:   Procedure Laterality Date     SECTION      COLONOSCOPY  2018    Dr. Naranjo    EPIDURAL STEROID INJECTION  2018    L4-5 MARCOS X 5 - Cook    EPIDURAL STEROID INJECTION  2017    L5-S1 MARCOS - Cook    EPIDURAL STEROID INJECTION N/A 2022    Procedure: Injection, Steroid, Epidural, L4/5;  Surgeon: Yari Soto MD;  Location: [facility];  Service: Pain Management;  Laterality: N/A;  MESSAGE LEFT ON VM TO BE TESTED ON 1-14    ESOPHAGOGASTRODUODENOSCOPY  2018    EYE SURGERY      EYE SURGERY Left     shank    GALLBLADDER SURGERY      HYSTERECTOMY      INJECTION OF FACET JOINT Bilateral 2017    Bilateral L3-S1 Facet Injection - Cook    KELOID EXCISION Bilateral 2023    Procedure: EXCISION, KELOID ON BILATERAL EARLOBES;  Surgeon: Moreno Xiong MD;  Location: [facility] OR;  Service: ENT;  Laterality: Bilateral;    LAPAROSCOPIC CHOLECYSTECTOMY N/A 2021    Procedure: CHOLECYSTECTOMY, LAPAROSCOPIC;  Surgeon: Amy Wagner MD;  Location: [facility] OR;  Service: General;  Laterality: N/A;  fully vaccinated    OOPHORECTOMY      TRANSFORAMINAL EPIDURAL INJECTION OF STEROID Bilateral 2022    Procedure:  Injection,steroid,epidural,transforaminal approach, L4/5;  Surgeon: Yari Soto MD;  Location: [facility];  Service: Pain Management;  Laterality: Bilateral;  pt aware on ov to be tested     Social History     Socioeconomic History    Marital status:    Tobacco Use    Smoking status: Never     Passive exposure: Never    Smokeless tobacco: Never   Substance and Sexual Activity    Alcohol use: Never    Drug use: Yes     Types: Oxycodone     Social Drivers of Health     Financial Resource Strain: Not on File (2022)    Received from [employer]    Financial Resource Strain     Financial Resource Strain: 0   Food Insecurity: Not on File (2024)    Received from [employer]    Food Insecurity     Food: 0   Transportation Needs: Not on File (2022)    Received from [employer]    Transportation Needs     Transportation: 0   Physical Activity: Not on File (2022)    Received from [employer]    Physical Activity     Physical Activity: 0   Stress: Not on File (2022)    Received from [employer]    Stress     Stress: 0   Housing Stability: Not on File (2022)    Received from [employer]    Housing Stability     Housin     Family History   Problem Relation Name Age of Onset    Hypertension Mother      Breast cancer Mother      Thyroid cancer Mother      Diabetes type II Mother      Stroke Mother      Heart attack Mother       Review of patient's allergies indicates:  No Known Allergies  has a current medication list which includes the following prescription(s): amlodipine, aspirin, blood pressure monitor, carvedilol, cholecalciferol (vitamin d3), cyanocobalamin, dorzolamide-timolol 2-0.5%, empagliflozin, gabapentin, hydralazine, lisinopril-hydrochlorothiazide, meloxicam, metformin, rhopressa, omeprazole, oxycodone-acetaminophen, potassium chloride sa, rhopressa, sitagliptin phosphate, tafluprost (pf), travoprost, buspirone, citalopram, estradiol, glipizide, metoclopramide hcl,  metronidazole, naproxen, oxycodone-acetaminophen, and quetiapine, and the following Facility-Administered Medications: ketorolac.      Objective:  Vitals:    11/11/24 1154   BP: (!) 151/78   Pulse: 72   Resp: 18        Physical Exam  Vitals and nursing note (Accompanied by her ) reviewed. Chaperone present: Accompanied by her .   Constitutional:       General: She is not in acute distress.     Appearance: Normal appearance. She is not ill-appearing, toxic-appearing or diaphoretic.   HENT:      Head: Normocephalic and atraumatic.      Nose: Nose normal.      Mouth/Throat:      Mouth: Mucous membranes are moist.   Eyes:      Extraocular Movements: Extraocular movements intact.      Pupils: Pupils are equal, round, and reactive to light.   Cardiovascular:      Rate and Rhythm: Normal rate and regular rhythm.      Heart sounds: Normal heart sounds.   Pulmonary:      Effort: Pulmonary effort is normal. No respiratory distress.      Breath sounds: Normal breath sounds. No stridor. No wheezing or rhonchi.   Abdominal:      General: Bowel sounds are normal.      Palpations: Abdomen is soft.   Musculoskeletal:         General: No swelling or deformity.      Cervical back: Normal and normal range of motion. No spasms or tenderness. No pain with movement. Normal range of motion.      Thoracic back: Normal.      Lumbar back: Tenderness and bony tenderness present. No spasms. Decreased range of motion. Negative right straight leg raise test and negative left straight leg raise test. No scoliosis.      Right lower leg: No edema.      Left lower leg: No edema.      Comments: Lumbar pain with flexion, extension lateral rotation.  Bilateral spinous and paraspinous process tenderness to palpation   Skin:     General: Skin is warm.   Neurological:      General: No focal deficit present.      Mental Status: She is alert and oriented to person, place, and time. Mental status is at baseline.      Cranial Nerves: No cranial  nerve deficit.      Sensory: Sensation is intact. No sensory deficit.      Motor: No weakness.      Coordination: Coordination normal.      Gait: Gait abnormal.      Deep Tendon Reflexes:      Reflex Scores:       Patellar reflexes are 1+ on the right side and 1+ on the left side.       Achilles reflexes are 1+ on the right side and 1+ on the left side.  Psychiatric:         Mood and Affect: Mood normal.         Behavior: Behavior normal.           Assessment:      1. Encounter for long-term (current) use of medications    2. Myalgia    3. Myalgia, other site    4. Lumbosacral radiculopathy          Plan:  1. reviewed  2.Addiction, Dependency, Tolerance, Opioid abuse-misuse, Death, Diversion Discussed. Overdose reversal drug Naloxone discussed. Patient is prescribed opiates for chronic nonmalignant pain pathology.  Patient is receiving opiates which require greater than a 72 hour supply of therapy.  Patient was educated on potential dependency associated with long-term opioid use as well as decreasing efficacy with prolonged use.  Patient was advised of risks, benefits and side effects and how to utilize each medication.  Patient was also informed that any deviation from therapy protocol will  lead to discontinuation of opiates.  It is reasonable to prescribe opioid analgesics for patient based on positive response to opioid medications, lack of side effects and  limited aberrant behavior.    3.Refill/Continue medications for pain control and function       Requested Prescriptions     Signed Prescriptions Disp Refills    naproxen (NAPROSYN) 500 MG tablet 60 tablet 1     Sig: Take 1 tablet (500 mg total) by mouth 2 (two) times daily.    oxyCODONE-acetaminophen (PERCOCET)  mg per tablet 90 tablet 0     Sig: Take 1 tablet by mouth every 8 (eight) hours as needed for Pain.     4. Schedule L5-S1 epidural steroid injection epidurogram under fluoroscopy for lumbosacral radiculopathy    5.Indications for this  procedure for this specific patient include the following   -History, physical examination and concordant radiological image based diagnostic testing supports medical necessity for an epidural steroid injection  - neurogenic claudication due to central disc pathology, osteophyte complexes, severe degenerative disc disease producing foraminal or central stenosis  - repeat epidural steroid injection is medically necessary.  The 1st injection significantly provided improvement of at least 80 % sustained improvement in pain relief, improvement in function from baseline for least 3 months.  - Pt has been in pain for at least 6 weeks and has failed conservative care (e.g. Exercise, physical methods, NSAID/ and or muscle relaxants)   - Pt has no major risk factors for spinal cancer or contraindicated condition   - Neurogenic claudication and Radicular pain are interfering with functional activity  - Pain is associated with symptoms of nerve root irritation   - Any numbness documented is accompanied with paresthesia   - No evidence of systemic or local infection, bleeding tendency or unstable medical condition   - Epidural provided as part of a comprehensive pain management program  - All repeat injections have at least 80% pain relief and increase functional gain and physical activity, and reduction in reliance on the use of medication and or physical therapy  - Pt has significant functional limitation resulting in diminished quality of life and impaired age appropriate ADL's.   - Diagnostic evaluation has ruled out other causes of pain  - Pt participating in an active rehabilitation program or home exercise program which has been discussed with the patient including heat ice and rest  - No more than 3 epidurals will be done in a 6 month period at the same level with at least 7 days between injections  - MAC is only offered in cases of needle phobia   - Injection done at L5-S1 level which is consistent with patient's  dermatomal pain complaint    6.Monitored Anesthesia Care medical necessity authorization request:    Monitor anesthesia request is medically indicated for the scheduled nerve block procedure due to:  - needle phobia and anxiety, placing  the patient at risk during the provided service.  -patient has an ASA class greater than 3 and requires constant presence of an anesthesiologist during the procedure:  -patient has severe problems with muscles and muscle spasticity that makes it hard to lie still  -patient suffers from chronic pain and is unable to function due to  diminished ADLs    7.The planned medically necessary  surgical procedure is performed in a hospital outpatient department and not in an ambulatory surgical center due to:     -there is no geographically assessable ambulatory surgery center that has the  necessary equipment and fluoroscopy needed for the procedure     -there is no geographically assessable ambulatory surgical center available at which the physician has privileges     -an ASC's  specific  guideline regarding the individuals weight or health conditions that prevent the use of an ASC       -injections must be performed under fluoroscopy image guidance with contrast unless the patient has a documented contrast allergy or pregnancy     8.Follow with CORINA Rodriguez in 1 month for re-evaluation and medication refill       report:  Reviewed and consistent with medication use as prescribed.      The total time spent for evaluation and management on 11/11/2024 including reviewing separately obtained history, performing a medically appropriate exam and evaluation, documenting clinical information in the health record, independently interpreting results and communicating them to the patient/family/caregiver, and ordering medications/tests/procedures was between 15-29 minutes.    The above plan and management options were discussed at length with patient. Patient is in agreement with the above and  verbalized understanding. It will be communicated with the referring physician via electronic record, fax, or mail.

## 2024-11-25 DIAGNOSIS — M89.49 OSTEOARTHROSIS MULTIPLE SITES, NOT SPECIFIED AS GENERALIZED: Chronic | ICD-10-CM

## 2024-11-25 DIAGNOSIS — M19.90 CHRONIC INFLAMMATORY ARTHRITIS: ICD-10-CM

## 2024-11-25 DIAGNOSIS — G89.29 CHRONIC PAIN OF RIGHT KNEE: Chronic | ICD-10-CM

## 2024-11-25 DIAGNOSIS — M54.16 LUMBAR RADICULOPATHY, CHRONIC: Chronic | ICD-10-CM

## 2024-11-25 DIAGNOSIS — M25.561 CHRONIC PAIN OF RIGHT KNEE: Chronic | ICD-10-CM

## 2024-11-25 RX ORDER — GABAPENTIN 400 MG/1
400 CAPSULE ORAL EVERY 12 HOURS
Qty: 60 CAPSULE | Refills: 1 | Status: SHIPPED | OUTPATIENT
Start: 2024-11-25

## 2024-11-25 NOTE — TELEPHONE ENCOUNTER
----- Message from Emilia sent at 11/25/2024  8:56 AM CST -----  Regarding: RX  Who Called: Audrey Causye    Refill or New Rx:Refill  RX Name and Strength:gabapentin (NEURONTIN) 400 MG capsule  How is the patient currently taking it? (ex. 1XDay):2xday  Is this a 30 day or 90 day RX:30  Local or Mail Order:local   List of preferred pharmacies on file (remove unneeded): [unfilled]  If different Pharmacy is requested, enter Pharmacy information here including location and phone number: [facility] [place] [address]  Phone: [phone] Fax: [phone]  Hours: Not open 24 hours       Ordering Provider:Shows       Preferred Method of Contact: Phone Call  Patient's Preferred Phone Number on File: [phone]   Best Call Back Number, if different:  Additional Information:  would like to talk to a nurse

## 2024-12-05 ENCOUNTER — HOSPITAL ENCOUNTER (OUTPATIENT)
Facility: HOSPITAL | Age: 59
Discharge: HOME | End: 2024-12-05
Attending: PAIN MEDICINE | Admitting: PAIN MEDICINE
Payer: MEDICARE

## 2024-12-05 ENCOUNTER — ANESTHESIA EVENT (OUTPATIENT)
Dept: PAIN MEDICINE | Facility: HOSPITAL | Age: 59
End: 2024-12-05
Payer: MEDICARE

## 2024-12-05 ENCOUNTER — ANESTHESIA (OUTPATIENT)
Dept: PAIN MEDICINE | Facility: HOSPITAL | Age: 59
End: 2024-12-05
Payer: MEDICARE

## 2024-12-05 VITALS
BODY MASS INDEX: 32.8 KG/M2 | DIASTOLIC BLOOD PRESSURE: 75 MMHG | HEART RATE: 78 BPM | WEIGHT: 209 LBS | SYSTOLIC BLOOD PRESSURE: 108 MMHG | HEIGHT: 67 IN | TEMPERATURE: 97 F | OXYGEN SATURATION: 99 % | RESPIRATION RATE: 17 BRPM

## 2024-12-05 DIAGNOSIS — M54.17 LUMBOSACRAL RADICULOPATHY: Primary | ICD-10-CM

## 2024-12-05 LAB — GLUCOSE SERPL-MCNC: 140 MG/DL (ref 70–105)

## 2024-12-05 PROCEDURE — 82962 GLUCOSE BLOOD TEST: CPT

## 2024-12-05 PROCEDURE — 62323 NJX INTERLAMINAR LMBR/SAC: CPT | Mod: ,,, | Performed by: PAIN MEDICINE

## 2024-12-05 PROCEDURE — 62323 NJX INTERLAMINAR LMBR/SAC: CPT | Performed by: PAIN MEDICINE

## 2024-12-05 PROCEDURE — 27000284 HC CANNULA NASAL: Performed by: NURSE ANESTHETIST, CERTIFIED REGISTERED

## 2024-12-05 PROCEDURE — 63600175 PHARM REV CODE 636 W HCPCS: Performed by: NURSE ANESTHETIST, CERTIFIED REGISTERED

## 2024-12-05 PROCEDURE — 37000008 HC ANESTHESIA 1ST 15 MINUTES: Performed by: PAIN MEDICINE

## 2024-12-05 PROCEDURE — 25500020 PHARM REV CODE 255: Performed by: PAIN MEDICINE

## 2024-12-05 PROCEDURE — 63600175 PHARM REV CODE 636 W HCPCS: Performed by: PAIN MEDICINE

## 2024-12-05 RX ORDER — SODIUM CHLORIDE 9 MG/ML
INJECTION, SOLUTION INTRAVENOUS CONTINUOUS
Status: DISCONTINUED | OUTPATIENT
Start: 2024-12-05 | End: 2024-12-05 | Stop reason: HOSPADM

## 2024-12-05 RX ORDER — PROPOFOL 10 MG/ML
VIAL (ML) INTRAVENOUS
Status: DISCONTINUED | OUTPATIENT
Start: 2024-12-05 | End: 2024-12-05

## 2024-12-05 RX ORDER — IOPAMIDOL 612 MG/ML
INJECTION, SOLUTION INTRATHECAL CODE/TRAUMA/SEDATION MEDICATION
Status: DISCONTINUED | OUTPATIENT
Start: 2024-12-05 | End: 2024-12-05 | Stop reason: HOSPADM

## 2024-12-05 RX ORDER — TRIAMCINOLONE ACETONIDE 40 MG/ML
INJECTION, SUSPENSION INTRA-ARTICULAR; INTRAMUSCULAR CODE/TRAUMA/SEDATION MEDICATION
Status: DISCONTINUED | OUTPATIENT
Start: 2024-12-05 | End: 2024-12-05 | Stop reason: HOSPADM

## 2024-12-05 RX ORDER — LIDOCAINE HYDROCHLORIDE 20 MG/ML
INJECTION, SOLUTION EPIDURAL; INFILTRATION; INTRACAUDAL; PERINEURAL
Status: DISCONTINUED | OUTPATIENT
Start: 2024-12-05 | End: 2024-12-05

## 2024-12-05 RX ADMIN — PROPOFOL 50 MG: 10 INJECTION, EMULSION INTRAVENOUS at 08:12

## 2024-12-05 RX ADMIN — LIDOCAINE HYDROCHLORIDE 100 MG: 20 INJECTION, SOLUTION EPIDURAL; INFILTRATION; INTRACAUDAL; PERINEURAL at 08:12

## 2024-12-05 NOTE — TRANSFER OF CARE
"Anesthesia Transfer of Care Note    Patient: Audrey Causey    Procedure(s) Performed: Procedure(s) (LRB):  L5-S1 MARCOS (Bilateral)    Patient location: PACU    Anesthesia Type: general    Transport from OR: Transported from OR on room air with adequate spontaneous ventilation    Post pain: adequate analgesia    Post assessment: no apparent anesthetic complications    Post vital signs: stable    Level of consciousness: responds to stimulation    Nausea/Vomiting: no nausea/vomiting    Complications: none    Transfer of care protocol was followed      Last vitals: Visit Vitals  /75 (BP Location: Left arm, Patient Position: Lying)   Pulse 83   Temp 36.1 °C (97 °F) (Oral)   Resp 14   Ht 5' 7" (1.702 m)   Wt 94.8 kg (209 lb)   SpO2 98%   BMI 32.73 kg/m²     "

## 2024-12-05 NOTE — PLAN OF CARE
Plan:  D/c pt via wheelchair at 0930  Informed pt if does not void in 8 hours to go to ER. Notify if redness, drainage, from injection site or fever over next 3-4 days. Rest and drink plenty of fluids for the remainder of the day. No lifting over 5 lbs. For the remainder of the day. Continue regular medications as prescribed. May take pain medications as prescribed.     Pain improved 100%  Pre-procedure pain: 8  Post-procedure pain: 0

## 2024-12-05 NOTE — DISCHARGE SUMMARY
[facility] - Pain Management  Discharge Note  Short Stay    Procedure(s) (LRB):  L5-S1 MARCOS (Bilateral)      OUTCOME: Patient tolerated treatment/procedure well without complication and is now ready for discharge.    DISPOSITION: Home or Self Care    FINAL DIAGNOSIS:  Lumbosacral radiculopathy    FOLLOWUP: In clinic    DISCHARGE INSTRUCTIONS:  See nurse's notes     TIME SPENT ON DISCHARGE: 5 minutes

## 2024-12-05 NOTE — BRIEF OP NOTE
The  Discharge Note  Short Stay    Admit Date: 12/5/2024    Discharge Date: 12/5/2024    Attending Physician: Yari Soto     Discharge Provider: Yari Soto    Diagnosis:  Lumbosacral Radiculopathy    Procedure performed:  L5-S1 epidural steroid injection and epidurogram under fluoroscopy    Findings: Procedure tolerated well and without complications. Consistent with diagnosis.    EBL: 0cc    Specimens: None    Discharged Condition: Good    Final Diagnoses: Lumbosacral radiculopathy [M54.17]    Disposition: Home or Self Care    Hospital Course: No complications, uneventful    Outcome of Hospitalization, Treatment, Procedure, or Surgery:  Patient was admitted for outpatient interventional pain management procedure. The patient tolerated the procedure well with no complications.    Follow up/Patient Instructions:  Follow up as scheduled in Pain Management office in 3-4 weeks.  Patient has received instructions and follow up date and time.    Medications:  Continue previous medications

## 2024-12-05 NOTE — ANESTHESIA PREPROCEDURE EVALUATION
2024  Audrey Causey is a 59 y.o., female.    Social History     Socioeconomic History    Marital status:    Tobacco Use    Smoking status: Never     Passive exposure: Never    Smokeless tobacco: Never   Substance and Sexual Activity    Alcohol use: Never    Drug use: Yes     Types: Oxycodone     Social Drivers of Health     Financial Resource Strain: Not on File (2022)    Received from [employer]    Financial Resource Strain     Financial Resource Strain: 0   Food Insecurity: Not on File (2024)    Received from [employer]    Food Insecurity     Food: 0   Transportation Needs: Not on File (2022)    Received from [employer]    Transportation Needs     Transportation: 0   Physical Activity: Not on File (2022)    Received from [employer]    Physical Activity     Physical Activity: 0   Stress: Not on File (2022)    Received from [employer]    Stress     Stress: 0   Housing Stability: Not on File (2022)    Received from [employer]    Housing Stability     Housin      Pre-op Assessment    I have reviewed the Patient Summary Reports.     I have reviewed the Nursing Notes. I have reviewed the NPO Status.   I have reviewed the Medications.     Review of Systems  Anesthesia Hx:  No problems with previous Anesthesia                Social:  Non-Smoker       Cardiovascular:     Hypertension                                          Pulmonary:        Sleep Apnea, CPAP                Hepatic/GI:     GERD                Musculoskeletal:  Arthritis               Neurological:    Neuromuscular Disease,   Seizures                                Endocrine:  Diabetes             Past Medical History:   Diagnosis Date    Acute superficial gastritis without hemorrhage 2021    Arthritis     Diabetes mellitus     Diverticula, colon 2021    Esophageal dysphagia 2021     GERD (gastroesophageal reflux disease)     Hypertension     Low back pain     Lumbar radiculopathy     Seizures     Sleep apnea     Unspecified glaucoma       Past Surgical History:   Procedure Laterality Date     SECTION      COLONOSCOPY  2018    Dr. Naranjo    EPIDURAL STEROID INJECTION  2018    L4-5 MARCOS X 5 - Cook    EPIDURAL STEROID INJECTION  2017    L5-S1 MARCOS - Cook    EPIDURAL STEROID INJECTION N/A 2022    Procedure: Injection, Steroid, Epidural, L4/5;  Surgeon: Yari Soto MD;  Location: [facility];  Service: Pain Management;  Laterality: N/A;  MESSAGE LEFT ON VM TO BE TESTED ON 14    ESOPHAGOGASTRODUODENOSCOPY  2018    EYE SURGERY      EYE SURGERY Left     shank    GALLBLADDER SURGERY      HYSTERECTOMY      INJECTION OF FACET JOINT Bilateral 2017    Bilateral L3-S1 Facet Injection - Cook    KELOID EXCISION Bilateral 2023    Procedure: EXCISION, KELOID ON BILATERAL EARLOBES;  Surgeon: Moreno Xiong MD;  Location: [facility] OR;  Service: ENT;  Laterality: Bilateral;    LAPAROSCOPIC CHOLECYSTECTOMY N/A 2021    Procedure: CHOLECYSTECTOMY, LAPAROSCOPIC;  Surgeon: Amy Wagner MD;  Location: [facility] OR;  Service: General;  Laterality: N/A;  fully vaccinated    OOPHORECTOMY      TRANSFORAMINAL EPIDURAL INJECTION OF STEROID Bilateral 2022    Procedure: Injection,steroid,epidural,transforaminal approach, L4/5;  Surgeon: Yari Soto MD;  Location: [facility];  Service: Pain Management;  Laterality: Bilateral;  pt aware on ov to be tested        Physical Exam  General: Well nourished, Cooperative, Alert and Oriented    Airway:  Mallampati: II     Chest/Lungs:  Clear to auscultation    Heart:  Rate: Normal        Anesthesia Plan  Type of Anesthesia, risks & benefits discussed:    Anesthesia Type: Gen Natural Airway, MAC  Intra-op Monitoring Plan: Standard ASA Monitors  Post Op Pain Control Plan: multimodal analgesia  and IV/PO Opioids PRN  Induction:  IV  Informed Consent: Informed consent signed with the Patient and all parties understand the risks and agree with anesthesia plan.  All questions answered. Patient consented to blood products? Yes  ASA Score: 3  Day of Surgery Review of History & Physical: I have interviewed and examined the patient. I have reviewed the patient's H&P dated: There are no significant changes.     Ready For Surgery From Anesthesia Perspective.     .

## 2024-12-05 NOTE — ANESTHESIA POSTPROCEDURE EVALUATION
Anesthesia Post Evaluation    Patient: Audrey Causey    Procedure(s) Performed: Procedure(s) (LRB):  L5-S1 MARCOS (Bilateral)    Final Anesthesia Type: general      Patient location during evaluation: PACU  Patient participation: Yes- Able to Participate  Level of consciousness: responds to stimulation  Post-procedure vital signs: reviewed and stable  Pain management: adequate  Airway patency: patent  LEE ANN mitigation strategies: Multimodal analgesia  PONV status at discharge: No PONV  Anesthetic complications: no      Cardiovascular status: hemodynamically stable  Respiratory status: unassisted, spontaneous ventilation and room air  Hydration status: euvolemic  Follow-up not needed.  Comments: The pt was not arousable even with painful stimulation during the procedure.   Refer to nursing note for pain/nicholas score upon discharge from recovery.               Vitals Value Taken Time   /75 12/05/24 0925   Temp 36.1 °C (97 °F) 12/05/24 0903   Pulse 78 12/05/24 0925   Resp 17 12/05/24 0925   SpO2 99 % 12/05/24 0925         Event Time   Out of Recovery 09:25:00         Pain/Nicholas Score: Nicholas Score: 10 (12/5/2024  9:25 AM)

## 2024-12-05 NOTE — OP NOTE
"Procedure Note    Procedure Date: 12/5/2024    Procedure Performed:  Lumbar interlaminar epidural steroid injection under fluoroscopy at L5-S1    Indications: Patient failed conservative therapy.      Pre-op diagnosis: Lumbar Radiculopathy    Post-op diagnosis: same    Physician: Yari Soto MD    Anesthesia: MAC    Medications injected: Kenalog 40mg,  2 mL sterile preservative-free normal saline.    Local anesthetic used: 1% Lidocaine, 3 ml    Estimated Blood Loss: None    Complications:  None    Technique:  The patient was interviewed in the holding area and Risks/Benefits were discussed, including, but not limited to, the possibility of new or different pain, bleeding or infection.   All questions were answered.  The patient understood and accepted risks.  Consent was verified and signed.   A time-out was taken to identify patient and procedure prior to starting the procedure. The patient was placed in the prone position on the fluoroscopy table. The area of the lumbar spine was prepped with Chloraprep and draped in a sterile manner. The L5-S1  interspace was identified and marked under AP fluoroscopy. The skin and subcutaneous tissues overlying the targeted interspace were anesthetized with 3-5 mL of 1% lidocaine using a 25G 1.5" needle.  A 20G  3.5" Tuohy epidural needle was directed toward the interspace under fluoroscopic guidance until the ligamentum flavum was engaged. From this point, a loss of resistance technique with a pulsator syringe a was used to identify entrance of the needle into the epidural space. Once loss of resistance was observed 3mL of Isovue contrast solution was injected. An appropriate epidurogram was noted.  A 3mL mixture consisting of saline and 40 mg of kenalog was injected slowly and without resistance.  The needle was  removed and a sterile Band-Aid dressing was applied to the puncture site.  The patient tolerated the procedure well and was transferred to the .AC. in stable " condition.  The patient was monitored after the procedure and was given post-procedure and discharge instructions to follow at home. The patient was discharged in a stable condition and accompanied by an adult .    Epidurogram:5 mL allotment of Isovue M 300 contrast revealed excellent delineation from L4-S1. There were no filling defects or obstruction to dye flow noted.  There was no  intravascular or intrathecal spread noted with dye flow.

## 2024-12-12 ENCOUNTER — TELEPHONE (OUTPATIENT)
Dept: PAIN MEDICINE | Facility: CLINIC | Age: 59
End: 2024-12-12
Payer: MEDICARE

## 2024-12-12 DIAGNOSIS — M89.49 OSTEOARTHROSIS MULTIPLE SITES, NOT SPECIFIED AS GENERALIZED: Primary | ICD-10-CM

## 2024-12-12 RX ORDER — OXYCODONE AND ACETAMINOPHEN 10; 325 MG/1; MG/1
1 TABLET ORAL EVERY 8 HOURS PRN
Qty: 90 TABLET | Refills: 0 | Status: SHIPPED | OUTPATIENT
Start: 2024-12-12 | End: 2025-01-11

## 2024-12-12 NOTE — TELEPHONE ENCOUNTER
Patient has been notified that medication has been sent to pharmacy. Patient voices understanding. 12/12/24@13:20 pm TC.  ----- Message from Yari Soto MD sent at 12/12/2024 12:48 PM CST -----  Regarding: RE: refill  Medications sent to pharmacy  ----- Message -----  From: Andi Mata LPN  Sent: 12/12/2024  11:08 AM CST  To: Yari Soto MD  Subject: FW: refill                                       Patient had procedure on 12/05/2024. Patient is currently out of her Percocet and is requesting refill sent to [facility].  Thank you.  ----- Message -----  From: Anand Lezama, Patient Care Assistant  Sent: 12/12/2024  10:41 AM CST  To: [facility] Staff  Subject: refill                                           Who Called: Audrey Marium    Caller is requesting assistance/information from provider's office.            Preferred Method of Contact: Phone Call  Patient's Preferred Phone Number on File: [phone]   Best Call Back Number, if different:  Additional Information: patient said she had a procedure done on 12/05 and was prescribed some Percocet's, she is out and still in pain needs a refill.

## 2024-12-19 ENCOUNTER — OFFICE VISIT (OUTPATIENT)
Dept: PAIN MEDICINE | Facility: CLINIC | Age: 59
End: 2024-12-19
Payer: MEDICARE

## 2024-12-19 VITALS
SYSTOLIC BLOOD PRESSURE: 152 MMHG | RESPIRATION RATE: 20 BRPM | BODY MASS INDEX: 32.65 KG/M2 | WEIGHT: 208 LBS | DIASTOLIC BLOOD PRESSURE: 82 MMHG | HEIGHT: 67 IN | HEART RATE: 78 BPM

## 2024-12-19 DIAGNOSIS — G89.29 CHRONIC PAIN OF RIGHT KNEE: Chronic | ICD-10-CM

## 2024-12-19 DIAGNOSIS — M89.49 OSTEOARTHROSIS MULTIPLE SITES, NOT SPECIFIED AS GENERALIZED: Chronic | ICD-10-CM

## 2024-12-19 DIAGNOSIS — M25.561 CHRONIC PAIN OF RIGHT KNEE: Chronic | ICD-10-CM

## 2024-12-19 DIAGNOSIS — M54.17 LUMBOSACRAL RADICULOPATHY: Primary | Chronic | ICD-10-CM

## 2024-12-19 DIAGNOSIS — M19.90 CHRONIC INFLAMMATORY ARTHRITIS: Chronic | ICD-10-CM

## 2024-12-19 PROCEDURE — 99215 OFFICE O/P EST HI 40 MIN: CPT | Mod: PBBFAC | Performed by: PHYSICIAN ASSISTANT

## 2024-12-19 PROCEDURE — 1159F MED LIST DOCD IN RCRD: CPT | Mod: CPTII,,, | Performed by: PHYSICIAN ASSISTANT

## 2024-12-19 PROCEDURE — 3008F BODY MASS INDEX DOCD: CPT | Mod: CPTII,,, | Performed by: PHYSICIAN ASSISTANT

## 2024-12-19 PROCEDURE — 3079F DIAST BP 80-89 MM HG: CPT | Mod: CPTII,,, | Performed by: PHYSICIAN ASSISTANT

## 2024-12-19 PROCEDURE — 4010F ACE/ARB THERAPY RXD/TAKEN: CPT | Mod: CPTII,,, | Performed by: PHYSICIAN ASSISTANT

## 2024-12-19 PROCEDURE — 3077F SYST BP >= 140 MM HG: CPT | Mod: CPTII,,, | Performed by: PHYSICIAN ASSISTANT

## 2024-12-19 PROCEDURE — 99999 PR PBB SHADOW E&M-EST. PATIENT-LVL V: CPT | Mod: PBBFAC,,, | Performed by: PHYSICIAN ASSISTANT

## 2024-12-19 PROCEDURE — 99214 OFFICE O/P EST MOD 30 MIN: CPT | Mod: S$PBB,,, | Performed by: PHYSICIAN ASSISTANT

## 2024-12-19 RX ORDER — OXYCODONE AND ACETAMINOPHEN 10; 325 MG/1; MG/1
1 TABLET ORAL EVERY 8 HOURS PRN
Qty: 90 TABLET | Refills: 0 | Status: SHIPPED | OUTPATIENT
Start: 2025-02-10 | End: 2025-03-12

## 2024-12-19 RX ORDER — OXYCODONE AND ACETAMINOPHEN 10; 325 MG/1; MG/1
1 TABLET ORAL EVERY 8 HOURS PRN
Qty: 90 TABLET | Refills: 0 | Status: SHIPPED | OUTPATIENT
Start: 2025-01-11 | End: 2025-02-10

## 2024-12-19 RX ORDER — GABAPENTIN 400 MG/1
400 CAPSULE ORAL 3 TIMES DAILY
Qty: 90 CAPSULE | Refills: 1 | Status: SHIPPED | OUTPATIENT
Start: 2024-12-19

## 2025-02-24 PROBLEM — M47.812 CERVICAL SPONDYLOSIS WITHOUT MYELOPATHY: Chronic | Status: ACTIVE | Noted: 2025-02-24

## 2025-02-24 NOTE — PROGRESS NOTES
Subjective:         Patient ID: Audrey Causey is a 59 y.o. female.    Chief Complaint: Neck Pain (bilateral), Low-back Pain, Leg Pain (bilateral), Foot Pain (bilateral), Shoulder Pain (bilateral), Arm Pain (bilateral), and Hand Pain (bilateral)        Pain  This is a chronic problem. The current episode started more than 1 year ago. The problem occurs daily. The problem has been waxing and waning. Associated symptoms include arthralgias and neck pain. Pertinent negatives include no anorexia, chest pain, chills, coughing, diaphoresis, fever, sore throat, vertigo or vomiting.     Review of Systems   Constitutional:  Negative for activity change, appetite change, chills, diaphoresis, fever and unexpected weight change.   HENT:  Negative for drooling, ear discharge, ear pain, facial swelling, nosebleeds, sore throat, trouble swallowing, voice change and goiter.    Eyes:  Negative for photophobia, pain, discharge, redness and visual disturbance.   Respiratory:  Negative for apnea, cough, choking, chest tightness, shortness of breath, wheezing and stridor.    Cardiovascular:  Negative for chest pain, palpitations and leg swelling.   Gastrointestinal:  Negative for abdominal distention, anorexia, diarrhea, rectal pain, vomiting and fecal incontinence.   Endocrine: Negative for cold intolerance, heat intolerance, polydipsia, polyphagia and polyuria.   Genitourinary:  Negative for bladder incontinence, dysuria, flank pain, frequency and hot flashes.   Musculoskeletal:  Positive for arthralgias, back pain, leg pain, neck pain and neck stiffness.   Integumentary:  Negative for color change and pallor.   Allergic/Immunologic: Negative for immunocompromised state.   Neurological:  Negative for dizziness, vertigo, seizures, syncope, facial asymmetry, speech difficulty, light-headedness, memory loss and coordination difficulties.   Hematological:  Negative for adenopathy. Does not bruise/bleed easily.   Psychiatric/Behavioral:   Negative for agitation, behavioral problems, confusion, decreased concentration, dysphoric mood, hallucinations, self-injury and suicidal ideas. The patient is not nervous/anxious and is not hyperactive.            Past Medical History:   Diagnosis Date    Acute superficial gastritis without hemorrhage 2021    Arthritis     Diabetes mellitus     Diverticula, colon 2021    Esophageal dysphagia 2021    GERD (gastroesophageal reflux disease)     Hypertension     Low back pain     Lumbar radiculopathy     Seizures     Sleep apnea     Unspecified glaucoma      Past Surgical History:   Procedure Laterality Date     SECTION      COLONOSCOPY  2018    Dr. Naranjo    EPIDURAL STEROID INJECTION  2018    L4-5 MARCOS X 5 - Cook    EPIDURAL STEROID INJECTION  2017    L5-S1 MARCOS - Cook    EPIDURAL STEROID INJECTION N/A 2022    Procedure: Injection, Steroid, Epidural, L4/5;  Surgeon: Yari Soto MD;  Location: [facility];  Service: Pain Management;  Laterality: N/A;  MESSAGE LEFT ON VM TO BE TESTED ON 1-14    EPIDURAL STEROID INJECTION INTO LUMBAR SPINE Bilateral 2024    Procedure: L5-S1 MARCOS;  Surgeon: Yari Soto MD;  Location: [facility];  Service: Pain Management;  Laterality: Bilateral;    ESOPHAGOGASTRODUODENOSCOPY  2018    EYE SURGERY      EYE SURGERY Left     shank    GALLBLADDER SURGERY      HYSTERECTOMY      INJECTION OF FACET JOINT Bilateral 2017    Bilateral L3-S1 Facet Injection - Cook    KELOID EXCISION Bilateral 2023    Procedure: EXCISION, KELOID ON BILATERAL EARLOBES;  Surgeon: Moreno Xiong MD;  Location: [facility] OR;  Service: ENT;  Laterality: Bilateral;    LAPAROSCOPIC CHOLECYSTECTOMY N/A 2021    Procedure: CHOLECYSTECTOMY, LAPAROSCOPIC;  Surgeon: Amy Wagner MD;  Location: [facility] OR;  Service: General;  Laterality: N/A;  fully vaccinated    OOPHORECTOMY      TRANSFORAMINAL EPIDURAL INJECTION OF  "STEROID Bilateral 2022    Procedure: Injection,steroid,epidural,transforaminal approach, L4/5;  Surgeon: Yari Soto MD;  Location: [facility];  Service: Pain Management;  Laterality: Bilateral;  pt aware on ov to be tested     Social History     Socioeconomic History    Marital status:    Tobacco Use    Smoking status: Never     Passive exposure: Never    Smokeless tobacco: Never   Substance and Sexual Activity    Alcohol use: Never    Drug use: Yes     Types: Oxycodone     Social Drivers of Health     Financial Resource Strain: Not on File (2022)    Received from [facility]    Financial Resource Strain     Financial Resource Strain: 0   Food Insecurity: Not on File (2024)    Received from [facility]    Food Insecurity     Food: 0   Transportation Needs: Not on File (2022)    Received from [facility]    Transportation Needs     Transportation: 0   Physical Activity: Not on File (2022)    Received from [facility]    Physical Activity     Physical Activity: 0   Stress: Not on File (2022)    Received from [facility]    Stress     Stress: 0   Housing Stability: Not on File (2022)    Received from [facility]    Housing Stability     Housin     Family History   Problem Relation Name Age of Onset    Hypertension Mother      Breast cancer Mother      Thyroid cancer Mother      Diabetes type II Mother      Stroke Mother      Heart attack Mother       Review of patient's allergies indicates:  No Known Allergies     Objective:  Vitals:    25 0838   BP: 129/74   Resp: 18   Weight: 93.9 kg (207 lb)   Height: 5' 7" (1.702 m)   PainSc:   8   PainLoc: Back                     Physical Exam  Vitals and nursing note reviewed. Exam conducted with a chaperone present.   Constitutional:       General: She is awake.      Appearance: Normal appearance. She is not ill-appearing, toxic-appearing or diaphoretic.   HENT:      Head: Normocephalic and atraumatic.      Nose: Nose normal.      Mouth/Throat:    "   Mouth: Mucous membranes are moist.      Pharynx: Oropharynx is clear.   Eyes:      Conjunctiva/sclera: Conjunctivae normal.      Pupils: Pupils are equal, round, and reactive to light.   Cardiovascular:      Rate and Rhythm: Normal rate.   Pulmonary:      Effort: Pulmonary effort is normal. No respiratory distress.   Abdominal:      Palpations: Abdomen is soft.      Tenderness: There is no guarding.   Musculoskeletal:         General: Normal range of motion.      Cervical back: Normal range of motion and neck supple. Tenderness present. No rigidity.      Thoracic back: Tenderness present.      Lumbar back: Tenderness present.      Right knee: Tenderness present.      Left knee: Tenderness present.   Skin:     General: Skin is warm and dry.      Coloration: Skin is not jaundiced or pale.   Neurological:      General: No focal deficit present.      Mental Status: She is alert and oriented to person, place, and time. Mental status is at baseline.      Cranial Nerves: No cranial nerve deficit (II-XII).   Psychiatric:         Mood and Affect: Mood normal.         Behavior: Behavior normal. Behavior is cooperative.         Thought Content: Thought content normal.           FL Fluoro for Pain Management  See OP Notes for results.     IMPRESSION: See OP Notes for results.     This procedure was auto-finalized by: Virtual Radiologist         Admission on 12/05/2024, Discharged on 12/05/2024   Component Date Value Ref Range Status    POC Glucose 12/05/2024 140 (H)  70 - 105 mg/dL Final   Lab Visit on 11/25/2024   Component Date Value Ref Range Status    Vitamin D 25-Hydroxy, Blood 11/25/2024 61.4  30.0 - 80.0 ng/mL Final   Office Visit on 11/11/2024   Component Date Value Ref Range Status    POC Amphetamines 11/11/2024 Negative  Negative, Inconclusive Final    POC Barbiturates 11/11/2024 Negative  Negative, Inconclusive Final    POC Benzodiazepines 11/11/2024 Negative  Negative, Inconclusive Final    POC Cocaine 11/11/2024  Negative  Negative, Inconclusive Final    POC THC 11/11/2024 Negative  Negative, Inconclusive Final    POC Methadone 11/11/2024 Negative  Negative, Inconclusive Final    POC Methamphetamine 11/11/2024 Negative  Negative, Inconclusive Final    POC Opiates 11/11/2024 Negative  Negative, Inconclusive Final    POC Oxycodone 11/11/2024 Presumptive Positive (A)  Negative, Inconclusive Final    POC Phencyclidine 11/11/2024 Negative  Negative, Inconclusive Final    POC Methylenedioxymethamphetamine * 11/11/2024 Negative  Negative, Inconclusive Final    POC Tricyclic Antidepressants 11/11/2024 Negative  Negative, Inconclusive Final    POC Buprenorphine 11/11/2024 Negative   Final     Acceptable 11/11/2024 Yes   Final    POC Temperature (Urine) 11/11/2024 92   Final         Orders Placed This Encounter   Procedures    Controlled Substance Monitoring Panel, Random, Urine     Standing Status:   Future     Number of Occurrences:   1     Expected Date:   3/5/2025     Expiration Date:   5/4/2026    POCT Urine Drug Screen Presump     Interpretive Information:     Negative:  No drug detected at the cut off level.   Positive:  This result represents presumptive positive for the   tested drug, other substances may yield a positive response other   than the analyte of interest. This result should be utilized for   diagnostic purpose only. Confirmation testing will be performed upon physician request only.            Requested Prescriptions     Signed Prescriptions Disp Refills    oxyCODONE-acetaminophen (PERCOCET)  mg per tablet 90 tablet 0     Sig: Take 1 tablet by mouth every 8 (eight) hours as needed for Pain.    oxyCODONE-acetaminophen (PERCOCET)  mg per tablet 90 tablet 0     Sig: Take 1 tablet by mouth every 8 (eight) hours as needed for Pain.    gabapentin (NEURONTIN) 400 MG capsule 90 capsule 1     Sig: Take 1 capsule (400 mg total) by mouth 3 (three) times daily.       Assessment:     1. Lumbosacral  radiculopathy    2. Osteoarthrosis multiple sites, not specified as generalized    3. Cervical spondylosis without myelopathy    4. Chronic pain of right knee    5. Chronic inflammatory arthritis    6. Encounter for long-term (current) use of medications                   A's of Opioid Risk Assessment  Activity:Patient can perform ADL.   Analgesia:Patients pain is partially controlled by current medication. Patient has tried OTC medications such as Tylenol and Ibuprofen with out relief.   Adverse Effects: Patient denies constipation or sedation.  Aberrant Behavior:  reviewed with no aberrant drug seeking/taking behavior.  Overdose reversal drug naloxone discussed    Drug screen reviewed        MRI cervical spine [facility] April 30, 2024  C3/4 1 mm retrolisthesis posterior disc herniation moderate spinal canal stenosis mild bilateral foraminal stenosis  C4/5 severe spinal canal stenosis severe right and mild left foraminal stenosis  C5/6 severe spinal canal stenosis moderate foraminal stenosis  C6/7 moderate spinal canal canal stenosis mild foraminal stenosis bilateral    MRI lumbar spine advanced [facility] April 30, 2024  Loss of the normal lordotic curvature  Multiple level degenerative changes  L3/4 left paracentral neuroforaminal narrowing due to disc herniation  Mild left foraminal stenosis  L4/5 mild bilateral foraminal stenosis  L5/S1 no spinal canal or foraminal stenosis        Plan:    Pill count  March 05, 2025 correct       [place]    Narcan January 2023    Follows rheumatology inflammatory arthritis    She had bilateral lumbar L4/5 TFESI # 2 March 22, 2022  She states she had 80% relief after procedure   she states that procedure did help increase her level function     Follow-up after lumbar L5/S1 MARCOS # 1 December 5, 2024  She reports 50% relief after procedure  Procedure did help improve her level function    She declines surgical evaluation  for cervical and lumbar radiculopathy      No new complaints today     She states current medication does help with her chronic discomfort     She would like to continue current treatment plan    Continue current medication    Continue home exercise program as directed    Follow-up 2 months    Dr. Soto, December 2025    Bring original prescription medication bottles/container/box with labels to each visit

## 2025-03-05 ENCOUNTER — OFFICE VISIT (OUTPATIENT)
Dept: PAIN MEDICINE | Facility: CLINIC | Age: 60
End: 2025-03-05
Payer: MEDICARE

## 2025-03-05 VITALS
HEIGHT: 67 IN | SYSTOLIC BLOOD PRESSURE: 129 MMHG | DIASTOLIC BLOOD PRESSURE: 74 MMHG | BODY MASS INDEX: 32.49 KG/M2 | WEIGHT: 207 LBS | RESPIRATION RATE: 18 BRPM

## 2025-03-05 DIAGNOSIS — G89.29 CHRONIC PAIN OF RIGHT KNEE: Chronic | ICD-10-CM

## 2025-03-05 DIAGNOSIS — M54.17 LUMBOSACRAL RADICULOPATHY: Primary | Chronic | ICD-10-CM

## 2025-03-05 DIAGNOSIS — M47.812 CERVICAL SPONDYLOSIS WITHOUT MYELOPATHY: Chronic | ICD-10-CM

## 2025-03-05 DIAGNOSIS — M25.561 CHRONIC PAIN OF RIGHT KNEE: Chronic | ICD-10-CM

## 2025-03-05 DIAGNOSIS — M89.49 OSTEOARTHROSIS MULTIPLE SITES, NOT SPECIFIED AS GENERALIZED: Chronic | ICD-10-CM

## 2025-03-05 DIAGNOSIS — M19.90 CHRONIC INFLAMMATORY ARTHRITIS: Chronic | ICD-10-CM

## 2025-03-05 DIAGNOSIS — Z79.899 ENCOUNTER FOR LONG-TERM (CURRENT) USE OF MEDICATIONS: ICD-10-CM

## 2025-03-05 PROCEDURE — 80305 DRUG TEST PRSMV DIR OPT OBS: CPT | Mod: PBBFAC | Performed by: PHYSICIAN ASSISTANT

## 2025-03-05 PROCEDURE — 99999 PR PBB SHADOW E&M-EST. PATIENT-LVL V: CPT | Mod: PBBFAC,,, | Performed by: PHYSICIAN ASSISTANT

## 2025-03-05 PROCEDURE — 99999PBSHW POCT URINE DRUG SCREEN PRESUMP: Mod: PBBFAC,,,

## 2025-03-05 PROCEDURE — 4010F ACE/ARB THERAPY RXD/TAKEN: CPT | Mod: CPTII,,, | Performed by: PHYSICIAN ASSISTANT

## 2025-03-05 PROCEDURE — 99215 OFFICE O/P EST HI 40 MIN: CPT | Mod: PBBFAC | Performed by: PHYSICIAN ASSISTANT

## 2025-03-05 PROCEDURE — 3078F DIAST BP <80 MM HG: CPT | Mod: CPTII,,, | Performed by: PHYSICIAN ASSISTANT

## 2025-03-05 PROCEDURE — 3074F SYST BP LT 130 MM HG: CPT | Mod: CPTII,,, | Performed by: PHYSICIAN ASSISTANT

## 2025-03-05 PROCEDURE — 1159F MED LIST DOCD IN RCRD: CPT | Mod: CPTII,,, | Performed by: PHYSICIAN ASSISTANT

## 2025-03-05 PROCEDURE — 3008F BODY MASS INDEX DOCD: CPT | Mod: CPTII,,, | Performed by: PHYSICIAN ASSISTANT

## 2025-03-05 PROCEDURE — 99214 OFFICE O/P EST MOD 30 MIN: CPT | Mod: S$PBB,,, | Performed by: PHYSICIAN ASSISTANT

## 2025-03-05 RX ORDER — GABAPENTIN 400 MG/1
400 CAPSULE ORAL 3 TIMES DAILY
Qty: 90 CAPSULE | Refills: 1 | Status: SHIPPED | OUTPATIENT
Start: 2025-03-05

## 2025-03-05 RX ORDER — OXYCODONE AND ACETAMINOPHEN 10; 325 MG/1; MG/1
1 TABLET ORAL EVERY 8 HOURS PRN
Qty: 90 TABLET | Refills: 0 | Status: SHIPPED | OUTPATIENT
Start: 2025-04-11 | End: 2025-05-11

## 2025-03-05 RX ORDER — OXYCODONE AND ACETAMINOPHEN 10; 325 MG/1; MG/1
1 TABLET ORAL EVERY 8 HOURS PRN
Qty: 90 TABLET | Refills: 0 | Status: SHIPPED | OUTPATIENT
Start: 2025-03-12 | End: 2025-04-11

## 2025-04-23 NOTE — PROGRESS NOTES
Subjective:         Patient ID: Audrey Causey is a 59 y.o. female.    Chief Complaint: Low-back Pain (Patient is having low back pain and bilateral leg pain.  Neck pain and bilateral shoulder pain.)        Pain  This is a chronic problem. The current episode started more than 1 year ago. The problem occurs daily. The problem has been unchanged. Associated symptoms include arthralgias and neck pain. Pertinent negatives include no anorexia, chest pain, chills, coughing, diaphoresis, fever, sore throat, vertigo or vomiting.     Review of Systems   Constitutional:  Negative for activity change, appetite change, chills, diaphoresis, fever and unexpected weight change.   HENT:  Negative for drooling, ear discharge, ear pain, facial swelling, nosebleeds, sore throat, trouble swallowing, voice change and goiter.    Eyes:  Negative for photophobia, pain, discharge, redness and visual disturbance.   Respiratory:  Negative for apnea, cough, choking, chest tightness, shortness of breath, wheezing and stridor.    Cardiovascular:  Negative for chest pain, palpitations and leg swelling.   Gastrointestinal:  Negative for abdominal distention, anorexia, diarrhea, rectal pain, vomiting and fecal incontinence.   Endocrine: Negative for cold intolerance, heat intolerance, polydipsia, polyphagia and polyuria.   Genitourinary:  Negative for bladder incontinence, dysuria, flank pain, frequency and hot flashes.   Musculoskeletal:  Positive for arthralgias, back pain, leg pain, neck pain and neck stiffness.   Integumentary:  Negative for color change and pallor.   Allergic/Immunologic: Negative for immunocompromised state.   Neurological:  Negative for dizziness, vertigo, seizures, syncope, facial asymmetry, speech difficulty, light-headedness, coordination difficulties and memory loss.   Hematological:  Negative for adenopathy. Does not bruise/bleed easily.   Psychiatric/Behavioral:  Negative for agitation, behavioral problems, confusion,  decreased concentration, dysphoric mood, hallucinations, self-injury and suicidal ideas. The patient is not nervous/anxious and is not hyperactive.            Past Medical History:   Diagnosis Date    Acute superficial gastritis without hemorrhage 2021    Arthritis     Diabetes mellitus     Diverticula, colon 2021    Esophageal dysphagia 2021    GERD (gastroesophageal reflux disease)     Hypertension     Low back pain     Lumbar radiculopathy     Seizures     Sleep apnea     Unspecified glaucoma      Past Surgical History:   Procedure Laterality Date     SECTION      COLONOSCOPY  2018    Dr. Naranjo    EPIDURAL STEROID INJECTION  2018    L4-5 MARCOS X 5 - Cook    EPIDURAL STEROID INJECTION  2017    L5-S1 MARCOS - Cook    EPIDURAL STEROID INJECTION N/A 2022    Procedure: Injection, Steroid, Epidural, L4/5;  Surgeon: Yari Soto MD;  Location: [facility];  Service: Pain Management;  Laterality: N/A;  MESSAGE LEFT ON VM TO BE TESTED ON 1-14    EPIDURAL STEROID INJECTION INTO LUMBAR SPINE Bilateral 2024    Procedure: L5-S1 MARCOS;  Surgeon: Yari Soto MD;  [facility]: [facility];  Service: Pain Management;  Laterality: Bilateral;    ESOPHAGOGASTRODUODENOSCOPY  2018    EYE SURGERY      EYE SURGERY Left     shank    GALLBLADDER SURGERY      HYSTERECTOMY      INJECTION OF FACET JOINT Bilateral 2017    Bilateral L3-S1 Facet Injection - Cook    KELOID EXCISION Bilateral 2023    Procedure: EXCISION, KELOID ON BILATERAL EARLOBES;  Surgeon: Moreno Xiong MD;  Location: [facility] OR;  Service: ENT;  Laterality: Bilateral;    LAPAROSCOPIC CHOLECYSTECTOMY N/A 2021    Procedure: CHOLECYSTECTOMY, LAPAROSCOPIC;  Surgeon: Amy Wagner MD;  Location: [facility] OR;  Service: General;  Laterality: N/A;  fully vaccinated    OOPHORECTOMY      TRANSFORAMINAL EPIDURAL INJECTION OF STEROID Bilateral 2022    Procedure:  "Injection,steroid,epidural,transforaminal approach, L4/5;  Surgeon: Yari Soto MD;  Location: [facility];  Service: Pain Management;  Laterality: Bilateral;  pt aware on ov to be tested     Social History     Socioeconomic History    Marital status:    Tobacco Use    Smoking status: Never     Passive exposure: Never    Smokeless tobacco: Never   Substance and Sexual Activity    Alcohol use: Never    Drug use: Yes     Types: Oxycodone     Social Drivers of Health     Financial Resource Strain: Not on File (2022)    Received from [email or web address]    Financial Resource Strain     Financial Resource Strain: 0   Food Insecurity: Not on File (2024)    Received from [email or web address]    Food Insecurity     Food: 0   Transportation Needs: Not on File (2022)    Received from [email or web address]    Transportation Needs     Transportation: 0   Physical Activity: Not on File (2022)    Received from [email or web address]    Physical Activity     Physical Activity: 0   Stress: Not on File (2022)    Received from [email or web address]    Stress     Stress: 0   Housing Stability: Not on File (2022)    Received from [email or web address]    Housing Stability     Housin     Family History   Problem Relation Name Age of Onset    Hypertension Mother      Breast cancer Mother      Thyroid cancer Mother      Diabetes type II Mother      Stroke Mother      Heart attack Mother       Review of patient's allergies indicates:  No Known Allergies     Objective:  Vitals:    25 0851 25 0852   BP: 123/78    Pulse: 70    Resp: 18    Weight: 92.1 kg (203 lb)    Height: 5' 7" (1.702 m)    PainSc:   8   8   PainLoc: Back                        Physical Exam  Vitals and nursing note reviewed. Exam conducted with a chaperone present.   Constitutional:       General: She is awake.      Appearance: Normal appearance. She is not ill-appearing, toxic-appearing or diaphoretic.   HENT:      Head: Normocephalic and atraumatic.      Nose: Nose normal.      Mouth/Throat:      " Mouth: Mucous membranes are moist.      Pharynx: Oropharynx is clear.   Eyes:      Conjunctiva/sclera: Conjunctivae normal.      Pupils: Pupils are equal, round, and reactive to light.   Cardiovascular:      Rate and Rhythm: Normal rate.   Pulmonary:      Effort: Pulmonary effort is normal. No respiratory distress.   Abdominal:      Palpations: Abdomen is soft.      Tenderness: There is no guarding.   Musculoskeletal:      Right shoulder: Tenderness present. Decreased range of motion.      Left shoulder: Tenderness present. Decreased range of motion.      Cervical back: Normal range of motion and neck supple. Tenderness present. No rigidity.      Thoracic back: Tenderness present.      Lumbar back: Tenderness present.      Right knee: Tenderness present.      Left knee: Tenderness present.   Skin:     General: Skin is warm and dry.      Coloration: Skin is not jaundiced or pale.   Neurological:      General: No focal deficit present.      Mental Status: She is alert and oriented to person, place, and time. Mental status is at baseline.      Cranial Nerves: No cranial nerve deficit (II-XII).   Psychiatric:         Mood and Affect: Mood normal.         Behavior: Behavior normal. Behavior is cooperative.         Thought Content: Thought content normal.           FL Fluoro for Pain Management  See OP Notes for results.     IMPRESSION: See OP Notes for results.     This procedure was auto-finalized by: Virtual Radiologist         Office Visit on 03/05/2025   Component Date Value Ref Range Status    POC Amphetamines 03/05/2025 Negative  Negative, Inconclusive Final    POC Barbiturates 03/05/2025 Negative  Negative, Inconclusive Final    POC Benzodiazepines 03/05/2025 Negative  Negative, Inconclusive Final    POC Cocaine 03/05/2025 Negative  Negative, Inconclusive Final    POC THC 03/05/2025 Negative  Negative, Inconclusive Final    POC Methadone 03/05/2025 Negative  Negative, Inconclusive Final    POC Methamphetamine  03/05/2025 Negative  Negative, Inconclusive Final    POC Opiates 03/05/2025 Negative  Negative, Inconclusive Final    POC Oxycodone 03/05/2025 Presumptive Positive (A)  Negative, Inconclusive Final    POC Phencyclidine 03/05/2025 Negative  Negative, Inconclusive Final    POC Methylenedioxymethamphetamine * 03/05/2025 Negative  Negative, Inconclusive Final    POC Tricyclic Antidepressants 03/05/2025 Negative  Negative, Inconclusive Final    POC Buprenorphine 03/05/2025 Negative   Final     Acceptable 03/05/2025 Yes   Final    POC Temperature (Urine) 03/05/2025 92   Final    Creatinine, U 03/05/2025 133.2  mg/dL Final    Specific Gravity 03/05/2025 1.014   Final    pH 03/05/2025 5.6   Final    Oxidants 03/05/2025 Negative  Cutoff: 200 mg/L Final    Comment 03/05/2025 Normal   Final    Codeine 03/05/2025 Not Detected  Cutoff: 25 ng/mL Final    C6BG 03/05/2025 Not Detected  Cutoff: 100 ng/mL Final    Morphine 03/05/2025 Not Detected  Cutoff: 25 ng/mL Final    M6BG 03/05/2025 Not Detected  Cutoff: 100 ng/mL Final    6 Monoacetylmorphine 03/05/2025 Not Detected  Cutoff: 25 ng/mL Final    Hydrocodone 03/05/2025 Not Detected  Cutoff: 25 ng/mL Final    Norhydrocodone 03/05/2025 Not Detected  Cutoff: 25 ng/mL Final    Dihydrocodeine 03/05/2025 Not Detected  Cutoff: 25 ng/mL Final    Hydromorphone 03/05/2025 Not Detected  Cutoff: 25 ng/mL Final    Hydromorphone-3-beta-glucuronide 03/05/2025 Not Detected  Cutoff: 100 ng/mL Final    Oxycodone 03/05/2025 Present (A)  Cutoff: 25 ng/mL Final    Noroxycodone 03/05/2025 Present (A)  Cutoff: 25 ng/mL Final    Oxymorphone 03/05/2025 Not Detected  Cutoff: 25 ng/mL Final    Oxymorphone-3-beta-glucuronid 03/05/2025 Present (A)  Cutoff: 100 ng/mL Final    Noroxymorphone 03/05/2025 Present (A)  Cutoff: 25 ng/mL Final    Fentanyl 03/05/2025 Not Detected  Cutoff: 2 ng/mL Final    Norfentanyl 03/05/2025 Not Detected  Cutoff: 2 ng/mL Final    Meperidine 03/05/2025 Not Detected   Cutoff: 25 ng/mL Final    Normeperidine 03/05/2025 Not Detected  Cutoff: 25 ng/mL Final    Naloxone 03/05/2025 Not Detected  Cutoff: 25 ng/mL Final    Naloxone-3-beta-glucuronide 03/05/2025 Not Detected  Cutoff: 100 ng/mL Final    Methadone 03/05/2025 Not Detected  Cutoff: 25 ng/mL Final    EDDP 03/05/2025 Not Detected  Cutoff: 25 ng/mL Final    Propoxyphene 03/05/2025 Not Detected  Cutoff: 25 ng/mL Final    Norpropoxyphene 03/05/2025 Not Detected  Cutoff: 25 ng/mL Final    Tramadol 03/05/2025 Not Detected  Cutoff: 25 ng/mL Final    O-desmethyltramadol 03/05/2025 Not Detected  Cutoff: 25 ng/mL Final    Tapentadol 03/05/2025 Not Detected  Cutoff: 25 ng/mL Final    N-Desmethyltapentadol 03/05/2025 Not Detected  Cutoff: 50 ng/mL Final    M TAPENTADOL-BETA-GLUCURONIDE 03/05/2025 Not Detected  Cutoff: 100 ng/mL Final    Buprenorphine 03/05/2025 Not Detected  Cutoff: 5 ng/mL Final    Norbuprenorphine 03/05/2025 Not Detected  Cutoff: 5 ng/mL Final    Norbuprenorphine glucuronide 03/05/2025 Not Detected  Cutoff: 20 ng/mL Final    Opioid Interpretation 03/05/2025 SEE COMMENTS   Final    Cocaine 03/05/2025 Negative  Cutoff: 150 ng/mL Final    Tetrahydrocannabinol 03/05/2025 Negative  Cutoff: 50 ng/mL Final    Alprazolam 03/05/2025 Not Detected  Cutoff: 10 ng/mL Final    Alpha-Hydroxyalprazolam 03/05/2025 Not Detected  Cutoff: 10 ng/mL Final    Alpha-Hydroxyalprazolam Glucuronide 03/05/2025 Not Detected  Cutoff: 50 ng/mL Final    Chlordiazepoxide 03/05/2025 Not Detected  Cutoff: 10 ng/mL Final    Clobazam 03/05/2025 Not Detected  Cutoff: 10 ng/mL Final    N-Desmethylclobazam 03/05/2025 Not Detected  Cutoff: 200 ng/mL Final    Clonazepam 03/05/2025 Not Detected  Cutoff: 10 ng/mL Final    7-aminoclonazepam 03/05/2025 Not Detected  Cutoff: 10 ng/mL Final    Diazepam 03/05/2025 Not Detected  Cutoff: 10 ng/mL Final    Nordiazepam 03/05/2025 Not Detected  Cutoff: 10 ng/mL Final    Flunitrazepam 03/05/2025 Not Detected  Cutoff: 10  ng/mL Final    7-aminoflunitrazepam 03/05/2025 Not Detected  Cutoff: 10 ng/mL Final    Flurazepam 03/05/2025 Not Detected  Cutoff: 10 ng/mL Final    2-Hydroxy Ethyl Flurazepam 03/05/2025 Not Detected  Cutoff: 10 ng/mL Final    Lorazepam 03/05/2025 Not Detected  Cutoff: 10 ng/mL Final    Lorazepam Glucuronide 03/05/2025 Not Detected  Cutoff: 50 ng/mL Final    Midazolam 03/05/2025 Not Detected  Cutoff: 10 ng/mL Final    Alpha-Hydroxy Midazolam 03/05/2025 Not Detected  Cutoff: 10 ng/mL Final    Oxazepam 03/05/2025 Not Detected  Cutoff: 10 ng/mL Final    Oxazepam Glucuronide 03/05/2025 Not Detected  Cutoff: 50 ng/mL Final    Prazepam 03/05/2025 Not Detected  Cutoff: 10 ng/mL Final    Temazepam 03/05/2025 Not Detected  Cutoff: 10 ng/mL Final    Temazepam Glucuronide 03/05/2025 Not Detected  Cutoff: 50 ng/mL Final    Triazolam 03/05/2025 Not Detected  Cutoff: 10 ng/mL Final    Alpha-Hydroxy Triazolam 03/05/2025 Not Detected  Cutoff: 10 ng/mL Final    Zolpidem 03/05/2025 Not Detected  Cutoff: 10 ng/mL Final    Zolpidem Phenyl-4-Carboxylic acid 03/05/2025 Not Detected  Cutoff: 10 ng/mL Final    Benzodiazepine Interpretation 03/05/2025 SEE COMMENTS   Final    List Patient's Current Medications 03/05/2025 NA   Final    Methamphetamine 03/05/2025 Not Detected  Cutoff: 100 ng/mL Final    Amphetamine 03/05/2025 Not Detected  Cutoff: 100 ng/mL Final    3,4-methylenedioxymethamphetamine * 03/05/2025 Not Detected  Cutoff: 100 ng/mL Final    3,8-mokigbbghngupq-U-ethylamphetam* 03/05/2025 Not Detected  Cutoff: 100 ng/mL Final    3,4-methylenedioxyamphetamine (MDA) 03/05/2025 Not Detected  Cutoff: 100 ng/mL Final    Ephedrine 03/05/2025 Not Detected  Cutoff: 100 ng/mL Final    Pseudoephedrine 03/05/2025 Not Detected  Cutoff: 100 ng/mL Final    Phentermine 03/05/2025 Not Detected  Cutoff: 100 ng/mL Final    Phencyclidine (PCP) 03/05/2025 Not Detected  Cutoff: 20 ng/mL Final    Methylphenidate 03/05/2025 Not Detected  Cutoff: 20 ng/mL  Final    Ritalinic acid 03/05/2025 Not Detected  Cutoff: 100 ng/mL Final    Stimulant Interpretation 03/05/2025 SEE COMMENTS   Final    Barbiturates 03/05/2025 Negative  Cutoff: 200 ng/mL Final   Admission on 12/05/2024, Discharged on 12/05/2024   Component Date Value Ref Range Status    POC Glucose 12/05/2024 140 (H)  70 - 105 mg/dL Final   Lab Visit on 11/25/2024   Component Date Value Ref Range Status    Vitamin D 25-Hydroxy, Blood 11/25/2024 61.4  30.0 - 80.0 ng/mL Final   Office Visit on 11/11/2024   Component Date Value Ref Range Status    POC Amphetamines 11/11/2024 Negative  Negative, Inconclusive Final    POC Barbiturates 11/11/2024 Negative  Negative, Inconclusive Final    POC Benzodiazepines 11/11/2024 Negative  Negative, Inconclusive Final    POC Cocaine 11/11/2024 Negative  Negative, Inconclusive Final    POC THC 11/11/2024 Negative  Negative, Inconclusive Final    POC Methadone 11/11/2024 Negative  Negative, Inconclusive Final    POC Methamphetamine 11/11/2024 Negative  Negative, Inconclusive Final    POC Opiates 11/11/2024 Negative  Negative, Inconclusive Final    POC Oxycodone 11/11/2024 Presumptive Positive (A)  Negative, Inconclusive Final    POC Phencyclidine 11/11/2024 Negative  Negative, Inconclusive Final    POC Methylenedioxymethamphetamine * 11/11/2024 Negative  Negative, Inconclusive Final    POC Tricyclic Antidepressants 11/11/2024 Negative  Negative, Inconclusive Final    POC Buprenorphine 11/11/2024 Negative   Final     Acceptable 11/11/2024 Yes   Final    POC Temperature (Urine) 11/11/2024 92   Final         No orders of the defined types were placed in this encounter.        Requested Prescriptions     Signed Prescriptions Disp Refills    gabapentin (NEURONTIN) 400 MG capsule 90 capsule 1     Sig: Take 1 capsule (400 mg total) by mouth 3 (three) times daily.    oxyCODONE-acetaminophen (PERCOCET)  mg per tablet 90 tablet 0     Sig: Take 1 tablet by mouth every 8  (eight) hours as needed for Pain.    oxyCODONE-acetaminophen (PERCOCET)  mg per tablet 90 tablet 0     Sig: Take 1 tablet by mouth every 8 (eight) hours as needed for Pain.       Assessment:     1. Lumbosacral radiculopathy    2. Cervical spondylosis without myelopathy    3. Chronic pain of right knee                     A's of Opioid Risk Assessment  Activity:Patient can perform ADL.   Analgesia:Patients pain is partially controlled by current medication. Patient has tried OTC medications such as Tylenol and Ibuprofen with out relief.   Adverse Effects: Patient denies constipation or sedation.  Aberrant Behavior:  reviewed with no aberrant drug seeking/taking behavior.  Overdose reversal drug naloxone discussed    Drug screen reviewed        MRI cervical spine [facility] April 30, 2024  C3/4 1 mm retrolisthesis posterior disc herniation moderate spinal canal stenosis mild bilateral foraminal stenosis  C4/5 severe spinal canal stenosis severe right and mild left foraminal stenosis  C5/6 severe spinal canal stenosis moderate foraminal stenosis  C6/7 moderate spinal canal canal stenosis mild foraminal stenosis bilateral    MRI lumbar spine advanced imaging center [facility] April 30, 2024  Loss of the normal lordotic curvature  Multiple level degenerative changes  L3/4 left paracentral neuroforaminal narrowing due to disc herniation  Mild left foraminal stenosis  L4/5 mild bilateral foraminal stenosis  L5/S1 no spinal canal or foraminal stenosis        Plan:    Pill count  March 05, 2025 correct       [place]    Narcan January 2023    Follows rheumatology inflammatory arthritis    She had bilateral lumbar L4/5 TFESI # 2 March 22, 2022  She states she had 80% relief after procedure   she states that procedure did help increase her level function     Follow-up after lumbar L5/S1 MARCOS # 1 December 5, 2024  She reports 50% relief after procedure  Procedure did help  improve her level function    She declines surgical evaluation for cervical and lumbar radiculopathy      Having increasing back and joint pain worse with rainy weather    She would like to continue current treatment plan current medication does help control her discomfort    Continue home exercise program as directed    Continue current medication    Follow-up 2 months    Dr. Soto, December 2025    Bring original prescription medication bottles/container/box with labels to each visit

## 2025-05-01 ENCOUNTER — OFFICE VISIT (OUTPATIENT)
Dept: PAIN MEDICINE | Facility: CLINIC | Age: 60
End: 2025-05-01
Payer: MEDICARE

## 2025-05-01 VITALS
WEIGHT: 203 LBS | DIASTOLIC BLOOD PRESSURE: 78 MMHG | SYSTOLIC BLOOD PRESSURE: 123 MMHG | RESPIRATION RATE: 18 BRPM | HEIGHT: 67 IN | HEART RATE: 70 BPM | BODY MASS INDEX: 31.86 KG/M2

## 2025-05-01 DIAGNOSIS — G89.29 CHRONIC PAIN OF RIGHT KNEE: Chronic | ICD-10-CM

## 2025-05-01 DIAGNOSIS — M47.812 CERVICAL SPONDYLOSIS WITHOUT MYELOPATHY: Chronic | ICD-10-CM

## 2025-05-01 DIAGNOSIS — M25.561 CHRONIC PAIN OF RIGHT KNEE: Chronic | ICD-10-CM

## 2025-05-01 DIAGNOSIS — M54.17 LUMBOSACRAL RADICULOPATHY: Primary | Chronic | ICD-10-CM

## 2025-05-01 PROCEDURE — 99214 OFFICE O/P EST MOD 30 MIN: CPT | Mod: S$PBB,,, | Performed by: PHYSICIAN ASSISTANT

## 2025-05-01 PROCEDURE — 99215 OFFICE O/P EST HI 40 MIN: CPT | Mod: PBBFAC | Performed by: PHYSICIAN ASSISTANT

## 2025-05-01 PROCEDURE — 99999 PR PBB SHADOW E&M-EST. PATIENT-LVL V: CPT | Mod: PBBFAC,,, | Performed by: PHYSICIAN ASSISTANT

## 2025-05-01 PROCEDURE — 3078F DIAST BP <80 MM HG: CPT | Mod: CPTII,,, | Performed by: PHYSICIAN ASSISTANT

## 2025-05-01 PROCEDURE — 4010F ACE/ARB THERAPY RXD/TAKEN: CPT | Mod: CPTII,,, | Performed by: PHYSICIAN ASSISTANT

## 2025-05-01 PROCEDURE — 1159F MED LIST DOCD IN RCRD: CPT | Mod: CPTII,,, | Performed by: PHYSICIAN ASSISTANT

## 2025-05-01 PROCEDURE — 3074F SYST BP LT 130 MM HG: CPT | Mod: CPTII,,, | Performed by: PHYSICIAN ASSISTANT

## 2025-05-01 PROCEDURE — 3008F BODY MASS INDEX DOCD: CPT | Mod: CPTII,,, | Performed by: PHYSICIAN ASSISTANT

## 2025-05-01 RX ORDER — GABAPENTIN 400 MG/1
400 CAPSULE ORAL 3 TIMES DAILY
Qty: 90 CAPSULE | Refills: 1 | Status: SHIPPED | OUTPATIENT
Start: 2025-05-01

## 2025-05-01 RX ORDER — OXYCODONE AND ACETAMINOPHEN 10; 325 MG/1; MG/1
1 TABLET ORAL EVERY 8 HOURS PRN
Qty: 90 TABLET | Refills: 0 | Status: SHIPPED | OUTPATIENT
Start: 2025-06-10 | End: 2025-07-10

## 2025-05-01 RX ORDER — OXYCODONE AND ACETAMINOPHEN 10; 325 MG/1; MG/1
1 TABLET ORAL EVERY 8 HOURS PRN
Qty: 90 TABLET | Refills: 0 | Status: SHIPPED | OUTPATIENT
Start: 2025-05-10 | End: 2025-06-09

## 2025-06-03 ENCOUNTER — PATIENT OUTREACH (OUTPATIENT)
Facility: HOSPITAL | Age: 60
End: 2025-06-03
Payer: MEDICARE

## 2025-06-17 NOTE — PROGRESS NOTES
Subjective:         Patient ID: Audrey Causey is a 59 y.o. female.    Chief Complaint: Joint Pain        Pain  This is a chronic problem. The current episode started more than 1 year ago. The problem occurs daily. The problem has been waxing and waning. Associated symptoms include arthralgias and neck pain. Pertinent negatives include no anorexia, chest pain, chills, coughing, diaphoresis, fever, sore throat, vertigo or vomiting.     Review of Systems   Constitutional:  Negative for activity change, appetite change, chills, diaphoresis, fever and unexpected weight change.   HENT:  Negative for drooling, ear discharge, ear pain, facial swelling, nosebleeds, sore throat, trouble swallowing, voice change and goiter.    Eyes:  Negative for photophobia, pain, discharge, redness and visual disturbance.   Respiratory:  Negative for apnea, cough, choking, chest tightness, shortness of breath, wheezing and stridor.    Cardiovascular:  Negative for chest pain, palpitations and leg swelling.   Gastrointestinal:  Negative for abdominal distention, anorexia, diarrhea, rectal pain, vomiting and fecal incontinence.   Endocrine: Negative for cold intolerance, heat intolerance, polydipsia, polyphagia and polyuria.   Genitourinary:  Negative for bladder incontinence, dysuria, flank pain, frequency and hot flashes.   Musculoskeletal:  Positive for arthralgias, back pain, leg pain, neck pain and neck stiffness.   Integumentary:  Negative for color change and pallor.   Allergic/Immunologic: Negative for immunocompromised state.   Neurological:  Negative for dizziness, vertigo, seizures, syncope, facial asymmetry, speech difficulty, light-headedness, coordination difficulties and memory loss.   Hematological:  Negative for adenopathy. Does not bruise/bleed easily.   Psychiatric/Behavioral:  Negative for agitation, behavioral problems, confusion, decreased concentration, dysphoric mood, hallucinations, self-injury and suicidal ideas. The  patient is not nervous/anxious and is not hyperactive.            Past Medical History:   Diagnosis Date    Acute superficial gastritis without hemorrhage 2021    Arthritis     Diabetes mellitus     Diverticula, colon 2021    Esophageal dysphagia 2021    GERD (gastroesophageal reflux disease)     Hypertension     Low back pain     Lumbar radiculopathy     Seizures     Sleep apnea     Unspecified glaucoma      Past Surgical History:   Procedure Laterality Date     SECTION      COLONOSCOPY  2018    Dr. Naranjo    EPIDURAL STEROID INJECTION  2018    L4-5 MARCOS X 5 - Cook    EPIDURAL STEROID INJECTION  2017    L5-S1 MARCOS - Cook    EPIDURAL STEROID INJECTION N/A 2022    Procedure: Injection, Steroid, Epidural, L4/5;  Surgeon: Yari Soto MD;  Location: [facility];  Service: Pain Management;  Laterality: N/A;  MESSAGE LEFT ON VM TO BE TESTED ON 1-14    EPIDURAL STEROID INJECTION INTO LUMBAR SPINE Bilateral 2024    Procedure: L5-S1 MARCOS;  Surgeon: Yari Soto MD;  Location: [facility];  Service: Pain Management;  Laterality: Bilateral;    ESOPHAGOGASTRODUODENOSCOPY  2018    EYE SURGERY      EYE SURGERY Left     shank    GALLBLADDER SURGERY      HYSTERECTOMY      INJECTION OF FACET JOINT Bilateral 2017    Bilateral L3-S1 Facet Injection - Cook    KELOID EXCISION Bilateral 2023    Procedure: EXCISION, KELOID ON BILATERAL EARLOBES;  Surgeon: Moreno Xiong MD;  Location: [facility];  Service: ENT;  Laterality: Bilateral;    LAPAROSCOPIC CHOLECYSTECTOMY N/A 2021    Procedure: CHOLECYSTECTOMY, LAPAROSCOPIC;  Surgeon: Amy Wagner MD;  Location: [facility] OR;  Service: General;  Laterality: N/A;  fully vaccinated    OOPHORECTOMY      TRANSFORAMINAL EPIDURAL INJECTION OF STEROID Bilateral 2022    Procedure: Injection,steroid,epidural,transforaminal approach, L4/5;  Surgeon: Yari Soto MD;  Location: [place]  [facility];  Service: Pain Management;  Laterality: Bilateral;  pt aware on ov to be tested     Social History     Socioeconomic History    Marital status:    Tobacco Use    Smoking status: Never     Passive exposure: Never    Smokeless tobacco: Never   Substance and Sexual Activity    Alcohol use: Never    Drug use: Yes     Types: Oxycodone     Social Drivers of Health     Financial Resource Strain: Not on File (2022)    Received from [facility]    Financial Resource Strain     Financial Resource Strain: 0   Food Insecurity: Not on File (2024)    Received from [facility]    Food Insecurity     Food: 0   Transportation Needs: Not on File (2022)    Received from [facility]    Transportation Needs     Transportation: 0   Physical Activity: Not on File (2022)    Received from [facility]    Physical Activity     Physical Activity: 0   Stress: Not on File (2022)    Received from [facility]    Stress     Stress: 0   Housing Stability: Not on File (2022)    Received from [facility]    Housing Stability     Housin     Family History   Problem Relation Name Age of Onset    Hypertension Mother      Breast cancer Mother      Thyroid cancer Mother      Diabetes type II Mother      Stroke Mother      Heart attack Mother       Review of patient's allergies indicates:  No Known Allergies     Objective:  Vitals:    25 0848   BP: (!) 142/68   Pulse: 83   Resp: 20   Weight: 95.7 kg (211 lb)   Height: 5' 7" (1.702 m)                         Physical Exam  Vitals and nursing note reviewed. Exam conducted with a chaperone present.   Constitutional:       General: She is awake.      Appearance: Normal appearance. She is not ill-appearing, toxic-appearing or diaphoretic.   HENT:      Head: Normocephalic and atraumatic.      Nose: Nose normal.      Mouth/Throat:      Mouth: Mucous membranes are moist.      Pharynx: Oropharynx is clear.   Eyes:      Conjunctiva/sclera: Conjunctivae normal.      Pupils: Pupils are equal, " round, and reactive to light.   Cardiovascular:      Rate and Rhythm: Normal rate.   Pulmonary:      Effort: Pulmonary effort is normal. No respiratory distress.   Abdominal:      Palpations: Abdomen is soft.      Tenderness: There is no guarding.   Musculoskeletal:      Right shoulder: Tenderness present. Decreased range of motion.      Left shoulder: Tenderness present. Decreased range of motion.      Cervical back: Normal range of motion and neck supple. Tenderness present. No rigidity.      Thoracic back: Tenderness present.      Lumbar back: Tenderness present.      Right knee: Tenderness present.      Left knee: Tenderness present.   Skin:     General: Skin is warm and dry.      Coloration: Skin is not jaundiced or pale.   Neurological:      General: No focal deficit present.      Mental Status: She is alert and oriented to person, place, and time. Mental status is at baseline.      Cranial Nerves: No cranial nerve deficit (II-XII).   Psychiatric:         Mood and Affect: Mood normal.         Behavior: Behavior normal. Behavior is cooperative.         Thought Content: Thought content normal.           [place] [facility] for Pain Management  See OP Notes for results.     IMPRESSION: See OP Notes for results.     This procedure was auto-finalized by: Virtual Radiologist         Office Visit on 03/05/2025   Component Date Value Ref Range Status    POC Amphetamines 03/05/2025 Negative  Negative, Inconclusive Final    POC Barbiturates 03/05/2025 Negative  Negative, Inconclusive Final    POC Benzodiazepines 03/05/2025 Negative  Negative, Inconclusive Final    POC Cocaine 03/05/2025 Negative  Negative, Inconclusive Final    POC THC 03/05/2025 Negative  Negative, Inconclusive Final    POC Methadone 03/05/2025 Negative  Negative, Inconclusive Final    POC Methamphetamine 03/05/2025 Negative  Negative, Inconclusive Final    POC Opiates 03/05/2025 Negative  Negative, Inconclusive Final    POC Oxycodone 03/05/2025 Presumptive  Positive (A)  Negative, Inconclusive Final    POC Phencyclidine 03/05/2025 Negative  Negative, Inconclusive Final    POC Methylenedioxymethamphetamine * 03/05/2025 Negative  Negative, Inconclusive Final    POC Tricyclic Antidepressants 03/05/2025 Negative  Negative, Inconclusive Final    POC Buprenorphine 03/05/2025 Negative   Final     Acceptable 03/05/2025 Yes   Final    POC Temperature (Urine) 03/05/2025 92   Final    Creatinine, U 03/05/2025 133.2  mg/dL Final    Specific Gravity 03/05/2025 1.014   Final    pH 03/05/2025 5.6   Final    Oxidants 03/05/2025 Negative  Cutoff: 200 mg/L Final    Comment 03/05/2025 Normal   Final    Codeine 03/05/2025 Not Detected  Cutoff: 25 ng/mL Final    C6BG 03/05/2025 Not Detected  Cutoff: 100 ng/mL Final    Morphine 03/05/2025 Not Detected  Cutoff: 25 ng/mL Final    M6BG 03/05/2025 Not Detected  Cutoff: 100 ng/mL Final    6 Monoacetylmorphine 03/05/2025 Not Detected  Cutoff: 25 ng/mL Final    Hydrocodone 03/05/2025 Not Detected  Cutoff: 25 ng/mL Final    Norhydrocodone 03/05/2025 Not Detected  Cutoff: 25 ng/mL Final    Dihydrocodeine 03/05/2025 Not Detected  Cutoff: 25 ng/mL Final    Hydromorphone 03/05/2025 Not Detected  Cutoff: 25 ng/mL Final    Hydromorphone-3-beta-glucuronide 03/05/2025 Not Detected  Cutoff: 100 ng/mL Final    Oxycodone 03/05/2025 Present (A)  Cutoff: 25 ng/mL Final    Noroxycodone 03/05/2025 Present (A)  Cutoff: 25 ng/mL Final    Oxymorphone 03/05/2025 Not Detected  Cutoff: 25 ng/mL Final    Oxymorphone-3-beta-glucuronid 03/05/2025 Present (A)  Cutoff: 100 ng/mL Final    Noroxymorphone 03/05/2025 Present (A)  Cutoff: 25 ng/mL Final    Fentanyl 03/05/2025 Not Detected  Cutoff: 2 ng/mL Final    Norfentanyl 03/05/2025 Not Detected  Cutoff: 2 ng/mL Final    Meperidine 03/05/2025 Not Detected  Cutoff: 25 ng/mL Final    Normeperidine 03/05/2025 Not Detected  Cutoff: 25 ng/mL Final    Naloxone 03/05/2025 Not Detected  Cutoff: 25 ng/mL Final     Naloxone-3-beta-glucuronide 03/05/2025 Not Detected  Cutoff: 100 ng/mL Final    Methadone 03/05/2025 Not Detected  Cutoff: 25 ng/mL Final    EDDP 03/05/2025 Not Detected  Cutoff: 25 ng/mL Final    Propoxyphene 03/05/2025 Not Detected  Cutoff: 25 ng/mL Final    Norpropoxyphene 03/05/2025 Not Detected  Cutoff: 25 ng/mL Final    Tramadol 03/05/2025 Not Detected  Cutoff: 25 ng/mL Final    O-desmethyltramadol 03/05/2025 Not Detected  Cutoff: 25 ng/mL Final    Tapentadol 03/05/2025 Not Detected  Cutoff: 25 ng/mL Final    N-Desmethyltapentadol 03/05/2025 Not Detected  Cutoff: 50 ng/mL Final    M TAPENTADOL-BETA-GLUCURONIDE 03/05/2025 Not Detected  Cutoff: 100 ng/mL Final    Buprenorphine 03/05/2025 Not Detected  Cutoff: 5 ng/mL Final    Norbuprenorphine 03/05/2025 Not Detected  Cutoff: 5 ng/mL Final    Norbuprenorphine glucuronide 03/05/2025 Not Detected  Cutoff: 20 ng/mL Final    Opioid Interpretation 03/05/2025 SEE COMMENTS   Final    Cocaine 03/05/2025 Negative  Cutoff: 150 ng/mL Final    Tetrahydrocannabinol 03/05/2025 Negative  Cutoff: 50 ng/mL Final    Alprazolam 03/05/2025 Not Detected  Cutoff: 10 ng/mL Final    Alpha-Hydroxyalprazolam 03/05/2025 Not Detected  Cutoff: 10 ng/mL Final    Alpha-Hydroxyalprazolam Glucuronide 03/05/2025 Not Detected  Cutoff: 50 ng/mL Final    Chlordiazepoxide 03/05/2025 Not Detected  Cutoff: 10 ng/mL Final    Clobazam 03/05/2025 Not Detected  Cutoff: 10 ng/mL Final    N-Desmethylclobazam 03/05/2025 Not Detected  Cutoff: 200 ng/mL Final    Clonazepam 03/05/2025 Not Detected  Cutoff: 10 ng/mL Final    7-aminoclonazepam 03/05/2025 Not Detected  Cutoff: 10 ng/mL Final    Diazepam 03/05/2025 Not Detected  Cutoff: 10 ng/mL Final    Nordiazepam 03/05/2025 Not Detected  Cutoff: 10 ng/mL Final    Flunitrazepam 03/05/2025 Not Detected  Cutoff: 10 ng/mL Final    7-aminoflunitrazepam 03/05/2025 Not Detected  Cutoff: 10 ng/mL Final    Flurazepam 03/05/2025 Not Detected  Cutoff: 10 ng/mL Final     2-Hydroxy Ethyl Flurazepam 03/05/2025 Not Detected  Cutoff: 10 ng/mL Final    Lorazepam 03/05/2025 Not Detected  Cutoff: 10 ng/mL Final    Lorazepam Glucuronide 03/05/2025 Not Detected  Cutoff: 50 ng/mL Final    Midazolam 03/05/2025 Not Detected  Cutoff: 10 ng/mL Final    Alpha-Hydroxy Midazolam 03/05/2025 Not Detected  Cutoff: 10 ng/mL Final    Oxazepam 03/05/2025 Not Detected  Cutoff: 10 ng/mL Final    Oxazepam Glucuronide 03/05/2025 Not Detected  Cutoff: 50 ng/mL Final    Prazepam 03/05/2025 Not Detected  Cutoff: 10 ng/mL Final    Temazepam 03/05/2025 Not Detected  Cutoff: 10 ng/mL Final    Temazepam Glucuronide 03/05/2025 Not Detected  Cutoff: 50 ng/mL Final    Triazolam 03/05/2025 Not Detected  Cutoff: 10 ng/mL Final    Alpha-Hydroxy Triazolam 03/05/2025 Not Detected  Cutoff: 10 ng/mL Final    Zolpidem 03/05/2025 Not Detected  Cutoff: 10 ng/mL Final    Zolpidem Phenyl-4-Carboxylic acid 03/05/2025 Not Detected  Cutoff: 10 ng/mL Final    Benzodiazepine Interpretation 03/05/2025 SEE COMMENTS   Final    List Patient's Current Medications 03/05/2025 NA   Final    Methamphetamine 03/05/2025 Not Detected  Cutoff: 100 ng/mL Final    Amphetamine 03/05/2025 Not Detected  Cutoff: 100 ng/mL Final    3,4-methylenedioxymethamphetamine * 03/05/2025 Not Detected  Cutoff: 100 ng/mL Final    3,3-idohfeparpmwve-V-ethylamphetam* 03/05/2025 Not Detected  Cutoff: 100 ng/mL Final    3,4-methylenedioxyamphetamine (MDA) 03/05/2025 Not Detected  Cutoff: 100 ng/mL Final    Ephedrine 03/05/2025 Not Detected  Cutoff: 100 ng/mL Final    Pseudoephedrine 03/05/2025 Not Detected  Cutoff: 100 ng/mL Final    Phentermine 03/05/2025 Not Detected  Cutoff: 100 ng/mL Final    Phencyclidine (PCP) 03/05/2025 Not Detected  Cutoff: 20 ng/mL Final    Methylphenidate 03/05/2025 Not Detected  Cutoff: 20 ng/mL Final    Ritalinic acid 03/05/2025 Not Detected  Cutoff: 100 ng/mL Final    Stimulant Interpretation 03/05/2025 SEE COMMENTS   Final    Barbiturates  03/05/2025 Negative  Cutoff: 200 ng/mL Final         Orders Placed This Encounter   Procedures    POCT Urine Drug Screen Presump     Interpretive Information:     Negative:  No drug detected at the cut off level.   Positive:  This result represents presumptive positive for the   tested drug, other substances may yield a positive response other   than the analyte of interest. This result should be utilized for   diagnostic purpose only. Confirmation testing will be performed upon physician request only.            Requested Prescriptions     Signed Prescriptions Disp Refills    gabapentin (NEURONTIN) 400 MG capsule 90 capsule 1     Sig: Take 1 capsule (400 mg total) by mouth 3 (three) times daily.    oxyCODONE-acetaminophen (PERCOCET)  mg per tablet 90 tablet 0     Sig: Take 1 tablet by mouth every 8 (eight) hours as needed for Pain.    oxyCODONE-acetaminophen (PERCOCET)  mg per tablet 90 tablet 0     Sig: Take 1 tablet by mouth every 8 (eight) hours as needed for Pain.       Assessment:     1. Lumbosacral radiculopathy    2. Cervical spondylosis without myelopathy    3. Chronic pain of right knee    4. Encounter for long-term (current) use of medications                       A's of Opioid Risk Assessment  Activity:Patient can perform ADL.   Analgesia:Patients pain is partially controlled by current medication. Patient has tried OTC medications such as Tylenol and Ibuprofen with out relief.   Adverse Effects: Patient denies constipation or sedation.  Aberrant Behavior:  reviewed with no aberrant drug seeking/taking behavior.  Overdose reversal drug naloxone discussed    Drug screen reviewed        MRI cervical spine advanced [facility] April 30, 2024  C3/4 1 mm retrolisthesis posterior disc herniation moderate spinal canal stenosis mild bilateral foraminal stenosis  C4/5 severe spinal canal stenosis severe right and mild left foraminal stenosis  C5/6 severe spinal canal stenosis  moderate foraminal stenosis  C6/7 moderate spinal canal canal stenosis mild foraminal stenosis bilateral    MRI lumbar spine advanced imaging center [facility] April 30, 2024  Loss of the normal lordotic curvature  Multiple level degenerative changes  L3/4 left paracentral neuroforaminal narrowing due to disc herniation  Mild left foraminal stenosis  L4/5 mild bilateral foraminal stenosis  L5/S1 no spinal canal or foraminal stenosis        Plan:    Pill count correct  June 25, 2025 March 05, 2025 correct       [place]    Narcan January 2023    Follows rheumatology inflammatory arthritis    She had bilateral lumbar L4/5 TFESI # 2 March 22, 2022  She states she had 80% relief after procedure   she states that procedure did help increase her level function     She had lumbar L5/S1 MARCOS # 1 December 5, 2024  She reports 50% relief after procedure  Procedure did help improve her level function      No new complaints today     She states current medications helping control her discomfort    Continue home exercise program as directed    Continue current medication    Follow-up 2 months    Dr. Soto, December 2025    Bring original prescription medication bottles/container/box with labels to each visit

## 2025-06-25 ENCOUNTER — OFFICE VISIT (OUTPATIENT)
Dept: PAIN MEDICINE | Facility: CLINIC | Age: 60
End: 2025-06-25
Payer: MEDICARE

## 2025-06-25 VITALS
DIASTOLIC BLOOD PRESSURE: 68 MMHG | RESPIRATION RATE: 20 BRPM | HEIGHT: 67 IN | WEIGHT: 211 LBS | BODY MASS INDEX: 33.12 KG/M2 | HEART RATE: 83 BPM | SYSTOLIC BLOOD PRESSURE: 142 MMHG

## 2025-06-25 DIAGNOSIS — M54.17 LUMBOSACRAL RADICULOPATHY: Primary | Chronic | ICD-10-CM

## 2025-06-25 DIAGNOSIS — M47.812 CERVICAL SPONDYLOSIS WITHOUT MYELOPATHY: Chronic | ICD-10-CM

## 2025-06-25 DIAGNOSIS — M25.561 CHRONIC PAIN OF RIGHT KNEE: Chronic | ICD-10-CM

## 2025-06-25 DIAGNOSIS — G89.29 CHRONIC PAIN OF RIGHT KNEE: Chronic | ICD-10-CM

## 2025-06-25 DIAGNOSIS — Z79.899 ENCOUNTER FOR LONG-TERM (CURRENT) USE OF MEDICATIONS: ICD-10-CM

## 2025-06-25 LAB
CTP QC/QA: YES
POC (AMP) AMPHETAMINE: NEGATIVE
POC (BAR) BARBITURATES: NEGATIVE
POC (BUP) BUPRENORPHINE: NEGATIVE
POC (BZO) BENZODIAZEPINES: NEGATIVE
POC (COC) COCAINE: NEGATIVE
POC (MDMA) METHYLENEDIOXYMETHAMPHETAMINE 3,4: NEGATIVE
POC (MET) METHAMPHETAMINE: NEGATIVE
POC (MOP) OPIATES: NEGATIVE
POC (MTD) METHADONE: NEGATIVE
POC (OXY) OXYCODONE: ABNORMAL
POC (PCP) PHENCYCLIDINE: NEGATIVE
POC (TCA) TRICYCLIC ANTIDEPRESSANTS: NEGATIVE
POC TEMPERATURE (URINE): 90
POC THC: NEGATIVE

## 2025-06-25 PROCEDURE — 99214 OFFICE O/P EST MOD 30 MIN: CPT | Mod: S$PBB,,, | Performed by: PHYSICIAN ASSISTANT

## 2025-06-25 PROCEDURE — 1159F MED LIST DOCD IN RCRD: CPT | Mod: CPTII,,, | Performed by: PHYSICIAN ASSISTANT

## 2025-06-25 PROCEDURE — 3008F BODY MASS INDEX DOCD: CPT | Mod: CPTII,,, | Performed by: PHYSICIAN ASSISTANT

## 2025-06-25 PROCEDURE — 99215 OFFICE O/P EST HI 40 MIN: CPT | Mod: PBBFAC | Performed by: PHYSICIAN ASSISTANT

## 2025-06-25 PROCEDURE — 3078F DIAST BP <80 MM HG: CPT | Mod: CPTII,,, | Performed by: PHYSICIAN ASSISTANT

## 2025-06-25 PROCEDURE — 99999 PR PBB SHADOW E&M-EST. PATIENT-LVL V: CPT | Mod: PBBFAC,,, | Performed by: PHYSICIAN ASSISTANT

## 2025-06-25 PROCEDURE — 99999PBSHW POCT URINE DRUG SCREEN PRESUMP: Mod: PBBFAC,,,

## 2025-06-25 PROCEDURE — 4010F ACE/ARB THERAPY RXD/TAKEN: CPT | Mod: CPTII,,, | Performed by: PHYSICIAN ASSISTANT

## 2025-06-25 PROCEDURE — 3077F SYST BP >= 140 MM HG: CPT | Mod: CPTII,,, | Performed by: PHYSICIAN ASSISTANT

## 2025-06-25 PROCEDURE — 80305 DRUG TEST PRSMV DIR OPT OBS: CPT | Mod: PBBFAC | Performed by: PHYSICIAN ASSISTANT

## 2025-06-25 RX ORDER — OXYCODONE AND ACETAMINOPHEN 10; 325 MG/1; MG/1
1 TABLET ORAL EVERY 8 HOURS PRN
Qty: 90 TABLET | Refills: 0 | Status: SHIPPED | OUTPATIENT
Start: 2025-08-09 | End: 2025-09-08

## 2025-06-25 RX ORDER — OXYCODONE AND ACETAMINOPHEN 10; 325 MG/1; MG/1
1 TABLET ORAL EVERY 8 HOURS PRN
Qty: 90 TABLET | Refills: 0 | Status: SHIPPED | OUTPATIENT
Start: 2025-07-10 | End: 2025-08-09

## 2025-06-25 RX ORDER — GABAPENTIN 400 MG/1
400 CAPSULE ORAL 3 TIMES DAILY
Qty: 90 CAPSULE | Refills: 1 | Status: SHIPPED | OUTPATIENT
Start: 2025-06-25

## 2025-08-25 ENCOUNTER — OFFICE VISIT (OUTPATIENT)
Dept: PAIN MEDICINE | Facility: CLINIC | Age: 60
End: 2025-08-25
Payer: MEDICARE

## 2025-08-25 VITALS
OXYGEN SATURATION: 98 % | HEIGHT: 67 IN | WEIGHT: 210 LBS | RESPIRATION RATE: 18 BRPM | SYSTOLIC BLOOD PRESSURE: 146 MMHG | DIASTOLIC BLOOD PRESSURE: 74 MMHG | BODY MASS INDEX: 32.96 KG/M2 | HEART RATE: 91 BPM

## 2025-08-25 DIAGNOSIS — M54.17 LUMBOSACRAL RADICULOPATHY: Primary | Chronic | ICD-10-CM

## 2025-08-25 DIAGNOSIS — M25.561 CHRONIC PAIN OF RIGHT KNEE: Chronic | ICD-10-CM

## 2025-08-25 DIAGNOSIS — M47.812 CERVICAL SPONDYLOSIS WITHOUT MYELOPATHY: Chronic | ICD-10-CM

## 2025-08-25 DIAGNOSIS — G89.29 CHRONIC PAIN OF RIGHT KNEE: Chronic | ICD-10-CM

## 2025-08-25 PROCEDURE — 99215 OFFICE O/P EST HI 40 MIN: CPT | Mod: PBBFAC | Performed by: PHYSICIAN ASSISTANT

## 2025-08-25 PROCEDURE — 99999 PR PBB SHADOW E&M-EST. PATIENT-LVL V: CPT | Mod: PBBFAC,,, | Performed by: PHYSICIAN ASSISTANT

## 2025-08-25 PROCEDURE — 96372 THER/PROPH/DIAG INJ SC/IM: CPT | Mod: PBBFAC | Performed by: PHYSICIAN ASSISTANT

## 2025-08-25 PROCEDURE — 3077F SYST BP >= 140 MM HG: CPT | Mod: CPTII,,, | Performed by: PHYSICIAN ASSISTANT

## 2025-08-25 PROCEDURE — 99214 OFFICE O/P EST MOD 30 MIN: CPT | Mod: S$PBB,25,, | Performed by: PHYSICIAN ASSISTANT

## 2025-08-25 PROCEDURE — 4010F ACE/ARB THERAPY RXD/TAKEN: CPT | Mod: CPTII,,, | Performed by: PHYSICIAN ASSISTANT

## 2025-08-25 PROCEDURE — 3078F DIAST BP <80 MM HG: CPT | Mod: CPTII,,, | Performed by: PHYSICIAN ASSISTANT

## 2025-08-25 PROCEDURE — 3008F BODY MASS INDEX DOCD: CPT | Mod: CPTII,,, | Performed by: PHYSICIAN ASSISTANT

## 2025-08-25 PROCEDURE — 99999PBSHW PR PBB SHADOW TECHNICAL ONLY FILED TO HB: Mod: PBBFAC,JZ,,

## 2025-08-25 PROCEDURE — 1159F MED LIST DOCD IN RCRD: CPT | Mod: CPTII,,, | Performed by: PHYSICIAN ASSISTANT

## 2025-08-25 RX ORDER — OXYCODONE AND ACETAMINOPHEN 10; 325 MG/1; MG/1
1 TABLET ORAL EVERY 8 HOURS PRN
Qty: 90 TABLET | Refills: 0 | Status: SHIPPED | OUTPATIENT
Start: 2025-10-08 | End: 2025-11-07

## 2025-08-25 RX ORDER — GABAPENTIN 400 MG/1
400 CAPSULE ORAL 3 TIMES DAILY
Qty: 90 CAPSULE | Refills: 1 | Status: SHIPPED | OUTPATIENT
Start: 2025-08-25

## 2025-08-25 RX ORDER — OXYCODONE AND ACETAMINOPHEN 10; 325 MG/1; MG/1
1 TABLET ORAL EVERY 8 HOURS PRN
Qty: 90 TABLET | Refills: 0 | Status: SHIPPED | OUTPATIENT
Start: 2025-09-08 | End: 2025-10-08

## 2025-08-25 RX ORDER — KETOROLAC TROMETHAMINE 30 MG/ML
60 INJECTION, SOLUTION INTRAMUSCULAR; INTRAVENOUS
Status: COMPLETED | OUTPATIENT
Start: 2025-08-25 | End: 2025-08-25

## 2025-08-25 RX ADMIN — KETOROLAC TROMETHAMINE 60 MG: 30 INJECTION, SOLUTION INTRAMUSCULAR at 08:08

## (undated) DEVICE — APPLICATOR CHLORAPREP HI-LITE TINTED ORANGE 26ML

## (undated) DEVICE — ENDO POUCH

## (undated) DEVICE — SUTURE MONOCRYL 4-0 27IN

## (undated) DEVICE — STAPLER SKIN REFLEX ONE 35 REG DISP

## (undated) DEVICE — NDL TUOHY EPIDURAL 20GX3.5IN

## (undated) DEVICE — GLOVE SURGICAL PROTEXIS PI SIZE 7

## (undated) DEVICE — GLOVE SURGICAL PROTEXIS PI SIZE 6.5

## (undated) DEVICE — SYRINGE 3CC LL 25GX5/8IN

## (undated) DEVICE — CDS LAP CHOLE

## (undated) DEVICE — Device

## (undated) DEVICE — NEEDLE ECLIPSE 22GX1 1/2 IN SAFETY

## (undated) DEVICE — TROCAR SLEEVE Z-THREAD 5MM X 100MM

## (undated) DEVICE — GLOVE SURGICAL PROTEXIS PI CLASSIC SIZE 7.5

## (undated) DEVICE — SET EXT STD BORE CATH 7.6IN

## (undated) DEVICE — SET IV SOL CONTIN-FLOW 10 DROP/ML (PRIMARY)

## (undated) DEVICE — TRAY NERVE BLOCK (KC) PMA

## (undated) DEVICE — GLOVE SENSICARE PI SURG 6.5

## (undated) DEVICE — KIT IV START

## (undated) DEVICE — SYR EPILOR LUER-LOK LOR 7ML

## (undated) DEVICE — SUT ETHILON 4-0 PS2 18 BLK

## (undated) DEVICE — SUTURE VICRYL 0 UR-6 27 IN VIOLET

## (undated) DEVICE — GOWN SURGICAL STERILE LEVEL 3 / XX-LARGE

## (undated) DEVICE — SLIPPER FALL PREV YEL BARIAT

## (undated) DEVICE — WARMER SCOPE DISP

## (undated) DEVICE — CATH IV JELCO 22GX1 IN

## (undated) DEVICE — DISSECTOR KITTNER ENDO BLUNT DISP

## (undated) DEVICE — GLOVE SURGICAL PROTEXIS PI BLUE SIZE 6.5

## (undated) DEVICE — APPLICATOR CHLORAPREP ORN 26ML

## (undated) DEVICE — SCISSOR INSERT 3/4 IN DISP

## (undated) DEVICE — TROCAR BLADELESS Z-THREAD 5MM X 100MM

## (undated) DEVICE — TRAY EPIDURAL SINGLE SHOT(PMA)

## (undated) DEVICE — MONOPOLAR FOOT SWITCHING LAP CHORD

## (undated) DEVICE — TRAY NERVE BLOCK UNIV 10/CA

## (undated) DEVICE — KIT IV START 849

## (undated) DEVICE — GLOVE PROTEXIS PI SYN SURG 6.0

## (undated) DEVICE — CATH IV INTROCAN 24G X 3/4

## (undated) DEVICE — IRRIGATOR SUCTION STYKERFLOW II DISP

## (undated) DEVICE — SYR 10CC LUER LOCK

## (undated) DEVICE — CLIP APPLIER LIGSMAX MULTI 5MM

## (undated) DEVICE — GLOVE SURGICAL PROTEXIS PI SIZE 7.5

## (undated) DEVICE — LAPARSCOPIC L-HOOK WIRE

## (undated) DEVICE — PENCIL HANDSWITCHING ROCKER SW

## (undated) DEVICE — NEEDLE SPINAL 22GX3.5 QUINCHE (KC)

## (undated) DEVICE — NDL HYPODERMIC SAF 25G 1.5IN

## (undated) DEVICE — TROCAR GELPORT BLUNT BALLOON SYSTEM 10/12X100MM LF

## (undated) DEVICE — ADHESIVE LIQUID MASTISOL 2/3CC

## (undated) DEVICE — SUT 4-0 VICRYL / FS-2